# Patient Record
Sex: FEMALE | Race: BLACK OR AFRICAN AMERICAN | Employment: OTHER | URBAN - METROPOLITAN AREA
[De-identification: names, ages, dates, MRNs, and addresses within clinical notes are randomized per-mention and may not be internally consistent; named-entity substitution may affect disease eponyms.]

---

## 2017-02-28 PROBLEM — E11.59 HYPERTENSION COMPLICATING DIABETES (HCC): Status: ACTIVE | Noted: 2017-02-28

## 2017-02-28 PROBLEM — I15.2 HYPERTENSION COMPLICATING DIABETES (HCC): Status: ACTIVE | Noted: 2017-02-28

## 2017-05-15 ENCOUNTER — HOSPITAL ENCOUNTER (OUTPATIENT)
Dept: GENERAL RADIOLOGY | Age: 72
Discharge: HOME | End: 2017-05-15
Payer: MEDICARE

## 2017-05-15 DIAGNOSIS — R06.02 SHORTNESS OF BREATH: ICD-10-CM

## 2017-05-15 PROCEDURE — 71020 XR CHEST PA LAT: CPT

## 2017-07-17 ENCOUNTER — HOSPITAL ENCOUNTER (OUTPATIENT)
Dept: CT IMAGING | Age: 72
Discharge: HOME | End: 2017-07-17
Payer: MEDICARE

## 2017-07-17 DIAGNOSIS — J98.4 RESTRICTIVE LUNG DISEASE: ICD-10-CM

## 2017-07-17 PROCEDURE — 71250 CT THORAX DX C-: CPT

## 2018-01-15 PROBLEM — E11.21 TYPE 2 DIABETES MELLITUS WITH NEPHROPATHY (HCC): Status: ACTIVE | Noted: 2018-01-15

## 2018-01-15 PROBLEM — F33.9 RECURRENT DEPRESSION (HCC): Status: ACTIVE | Noted: 2018-01-15

## 2018-02-28 ENCOUNTER — HOSPITAL ENCOUNTER (OUTPATIENT)
Dept: PHYSICAL THERAPY | Age: 73
Discharge: HOME | End: 2018-02-28
Payer: MEDICARE

## 2018-02-28 VITALS
DIASTOLIC BLOOD PRESSURE: 60 MMHG | OXYGEN SATURATION: 93 % | BODY MASS INDEX: 38.14 KG/M2 | SYSTOLIC BLOOD PRESSURE: 154 MMHG | HEIGHT: 67 IN | WEIGHT: 243 LBS | HEART RATE: 79 BPM

## 2018-02-28 PROCEDURE — 97162 PT EVAL MOD COMPLEX 30 MIN: CPT

## 2018-02-28 PROCEDURE — G8990 OTHER PT/OT CURRENT STATUS: HCPCS

## 2018-02-28 PROCEDURE — 97140 MANUAL THERAPY 1/> REGIONS: CPT

## 2018-02-28 PROCEDURE — G8991 OTHER PT/OT GOAL STATUS: HCPCS

## 2018-02-28 NOTE — PROGRESS NOTES
[address] [address]      INITIAL EVALUATION    NAME: Jayleen Naranjo AGE: 74 y.o. GENDER: female  DATE: 2/28/2018  REFERRING PHYSICIAN: Milton Rodriguez MD  HISTORY AND BACKGROUND:  Patient is a 75 y/o female with history LE lymphedema for greater than 10 years. Patient received treatment at [facility] prior to TKR in 2008, having undergone phase I CDT. Patient has continued with wear of custom flat knit knee high compression stockings, having them replaced via fitter at [facility], with most recent pair purchased 1-2 years ago. Note garments covering only half of lower legs, with fit likely impaired by recent progression of lymphedema. Oral diuretic has been increased to address increase in limb size, with patient reporting taking 60 mg Furosemide 2x/day. States legs have reduced in size, however, physicians nor patient want high dose of diuretic to continue. Patient is agreeable to proceeding with any treatment recommendations made today at conclusion of evaluation. Primary Diagnosis:  · Primary lymphedema (Q82.0)  Other Treatment Diagnoses:   Abnormality of gait (other abnormalities of gait and mobility R26.89)   Swelling not relieved by elevation ( R60.9 edema)   Venous insufficiency I87.2;    Cellulitis (R leg L03.116)   L LE DVT post TKE 2008 (L03.114)   PE (I26.9) 2008  Morbid Obesity (E66.01)  Date of Onset: prior to 2008  Present Symptoms and Functional Limitations: Bilateral LE lymphedema, L LE greater than R, however, recent cellulitis R foot. Home management of lymphedema with daytime compression stocking wear, custom flat knit knee high, CCL 2, however, not replaced in over a year. Gait deficits noted, patient using cane for gait, transfers/bed mobility require additional time.   LE weakness reported by patient. Lymphedema Life Impact Scale: 34/72; 50%, CK  Past Medical History:   Past Medical History:   Diagnosis Date    Anemia, chronic disease 7/31/2012    Arrhythmia 2006, 2008    [address] [facility]    Arthritis     OSTEO    CAD (coronary artery disease)     BLOCKAGES BILATERAL CAROTID, SAW MD 1 MONTH AGO    Chronic pain     LOWER BACK    Depression     Diabetes ([address]     TYPE 2, INSULIN    Hypercholesterolemia     Hypertension     Hypothyroidism 12/29/2011    Macular degeneration 9/18/2015    Osteoporosis     Pulmonary embolism ([address]     Pulmonary hypertension, mild 2006    Restrictive airway disease     Thromboembolus ([address] 2009    LEFT KNEE AFTER REPLACEMENT    Thyroid disease     HYPOTHYROID    Unspecified sleep apnea     USES CPAP     Past Surgical History:   Procedure Laterality Date    ENDOSCOPY, COLON, DIAGNOSTIC      2003;neg    HX HEART CATHETERIZATION  2006, 2008    2 TIMES    HX TONSILLECTOMY  1951    SC ANESTH,SURGERY OF SHOULDER      TOTAL KNEE ARTHROPLASTY  2009    LEFT     Current Medications:    Current Outpatient Prescriptions   Medication Sig    furosemide (LASIX) 40 mg tablet Take 1.5 Tabs by mouth two (2) times a day. (Patient taking differently: Take  by mouth. 2 tabs in am, 1 tab in pm)    atorvastatin (LIPITOR) 10 mg tablet TAKE 1 TABLET BY MOUTH  DAILY    cloNIDine HCl (CATAPRES) 0.2 mg tablet Take 1 tablet by mouth 3  times daily    potassium chloride SR (KLOR-CON 10) 10 mEq tablet TAKE 1 TABLET BY MOUTH  TWICE A DAY    levothyroxine (SYNTHROID) 100 mcg tablet Take 100 mcg by mouth Daily (before breakfast).  Cholecalciferol, Vitamin D3, (VITAMIN D3) 1,000 unit cap Take 1,000 Units by mouth daily.  ferrous sulfate (SLOW FE) 142 mg (45 mg iron) ER tablet Take  by mouth Daily (before breakfast).  VIT C/AMANDA AC/LUT/COPPER/ZNOX (PRESERVISION LUTEIN PO) Take  by mouth daily.     hydrALAZINE (APRESOLINE) 50 mg tablet Take 50 mg by mouth three (3) times daily.  metolazone (ZAROXOLYN) 2.5 mg tablet Take 2.5 mg by mouth. Twice a week.  ranolazine ER (RANEXA) 500 mg SR tablet Take 1,000 mg by mouth two (2) times a day.  febuxostat (ULORIC) 40 mg tab tablet Take 40 mg by mouth daily.  diltiazem hcl 360 mg Tb24 Take 360 mg by mouth daily.  insulin regular (NOVOLIN R, HUMULIN R) 100 unit/mL injection by SubCUTAneous route. 15 units BID (sometimes some at lunch depending on BS)    insulin NPH (NOVOLIN N, HUMULIN N) 100 unit/mL injection by SubCUTAneous route once. 20 units BID    aspirin (ASPIRIN) 325 mg tablet Take 325 mg by mouth daily.  VIT B12/INTRINS FACT/FA CMB #2 (INTRINSI B12-FOLATE PO) Take  by mouth daily. No current facility-administered medications for this encounter. Allergies: Allergies   Allergen Reactions    Levaquin [Levofloxacin] Other (comments)     Prolonged QT interval.      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 Currently lives alone, however, is moving in with her adult daughter in a few weeks. Pt reports long-term history of LE lymphedema, having become progressively worse over the past several month, 1 cellulitis infection R foot/LE recently, treated or oral antibiotic. Living Situation: alone currently, moving in with her adult daughter in the next few weeks. Trainable Caregiver?: patient states her daughter assists her as she is able currently, with will be more available when they live together. Self-care/ADLs: mod I showers, with shower seat/hand held shower, which she will also have with daughter. Dresses mod I, additional time. Light meal prep. Pt reports donning knee high garments mod I with donning aid. To bring to clinic for assessment. Mobility: transfers with straight cane, mod I, and additional time.   Gait short community distances with straight cane, able to walk from front door of clinic building to clinic 100+ feet, additional time, using elevator to get to second floor. Patient does not drive, over 5 years, taking transportation service to clinic today. Sleeping Arrangement:  bed   Adaptive Equipment Owned: cane, shower seat, hand held shower, CPAP. Previous Therapy:  Seen at [address] [place] [address] for phase I CDT over 10 years ago, so she is familiar with outpatient treatment of lymphedema. States she has been having custom flat knit daytime compression stockings replaced at [facility] intermittently, last replaced a year and a half ago, with proximal aspect of garment mid calf. Compression/Lymphedema Equipment:  Knee high [employer] Elvarex custom stockings, reportedly a year and a half old. Poor fit. Pt reports she would prefer to have compression stockings that she can apply lotion to her legs prior to donning garments, with latex in Elvarex garments limiting this. SUBJECTIVE:   Patient reports LE swelling for greater than 10 years. States she has continued with wear of knee high custom flat knit compression garment wear, however, currently garments are ill-fitting and in need of replacement. Pt is unable to recall how long LE lymphedema was present prior to treatment at [place], however, had become severe enough prior to 2008 that she sought medical attention. States her L LE has always been larger than her R LE, and that edema initially onset in foot region, L first, then R. Patients goals for therapy: reduce LE lymphedema; obtain latex free daytime custom flat knit compression stockings when appropriate, consider addition of night time garments.     OBJECTIVE DATA SUMMARY:   EXAMINATION/PRESENTATION/DECISION MAKING:   Pain:  Pain Scale 1: Numeric (0 - 10)  Pain Intensity 1: 8  Pain Location 1: Leg  Skin and Tissue Assessment:  Dermal Status:  []   Intact [x]  Dry   [x]  Tenuous [x] Flaky   []  Wound/lesion present []  Scars   [] Dermatitis    Texture/Consistency:  [x]  Boggy: foot/ankle [x]  Pitting Edema: +4 R dorsal foot/medial malleolar region; + 2 L dorsal foot/medial malleolar region. []  Brawny []  Combination   [x]  Fibrotic/Woody: distal lower leg    Pigmentation/Color Change:  []  Normal []  Hemosiderin   [x]  Red []  Erythematous   [x]  Hyperpigmented []  Hyperlipodermatosclerosis   Anomalies: na  []  Lymphorrhea []  Vesicles   []  Petechiae []  Warty Vercusis   []  Bullae []  Papilloma   Circulatory:  []  BELLE []  Varicosities:   [x]  Pulses: palpable dorsal pedal pulse bilateral []  Vascular studies ruled out DVT in past   [x]  DVT History: post TKR 2008, including PE. No DVT history since    Nails:  []   Normal  [x]   Fungus  Stemmers Sign: R>L, positive      Height:  Height: 5' 7\" (170.2 cm)  Weight:  Weight: 110.2 kg (243 lb)   BMI:  BMI (calculated): 38.1  (36 or greater: adversely affecting lymphedema)    Volumetric Measurements:   Right:  61,408. 16 mL Left:  12,324.83 mL   % Difference: 4.02%    (See scanned graph)  Range of Motion: bilateral hip flex 90; R knee flex 80; L knee flex 90; ankle DF -10 bilateral.    Strength: bilateral hip m 4-/5; quad/ham 4-/5; anterior tibialis m 4=/5. Sensation:  Decreased light touch, bilateral toes. Mobility:  Bed/Chair Mobility:  Modified independent and Additional time  Transfers:  Modified independent and Additional time    Sitting Balance:  good Standing Balance:  Fair+   Gait:  Modified independent and Additional time, wide base of support with straight cane, decreased stride length bilateral, forward flexed posture. Mirta Dykes would benefit patient during bandaging phase of treatment. Wheelchair Mobility:  na   Endurance: Moderately compromised, gait short community distances with straight cane. Stairs:  Not assessed         Safety:  Patient is alert and oriented:  x4   Safety awareness:  intact   Fall Risk?:  Moderate, ambulates with straight cane.   See gait assessment Patient given written fall prevention handout: Yes   Precautions:  Standard lymphedema precautions to include avoiding blood pressure readings, injections and IVs or other procedures/acts that could lead to broken skin on affected area, and avoiding excessive heat, resistive activity or altitude without compression garment    Functional Measure:   LLIS  In compliance with CMSs Claims Based Outcome Reporting, the following G-code set was chosen for this patient based on their primary functional limitation being treated: The outcome measure chosen to determine the severity of the functional limitation was the LLIS with a score of 34/72 which was correlated with the impairment scale. ? Other PT/OT Primary Functional Limitations:     - CURRENT STATUS: CK - 40%-59% impaired, limited or restricted    - GOAL STATUS: CJ - 20%-39% impaired, limited or restricted    - D/C STATUS:  ---------------To be determined---------------     Physical Therapy Evaluation Charge Determination   History Examination Presentation Decision-Making   MEDIUM  Complexity : 1-2 comorbidities / personal factors will impact the outcome/ POC  MEDIUM Complexity : 3 Standardized tests and measures addressing body structure, function, activity limitation and / or participation in recreation  MEDIUM Complexity : Evolving with changing characteristics  Other outcome measures LLIS  HIGH       Based on the above components, the patient evaluation is determined to be of the following complexity level: MEDIUM    Evaluation Time: 12:42-1:14pm  32 minutes    TREATMENT PROVIDED:   1. Treatment description:  The patient was educated regarding the role and function of the lymphatic system, pathophysiology of primary lymphedema, and was instructed in the lymphedema management protocol of complete decongestive therapy (CDT).   This includes skin care to prevent breakdown or infection, lymphedema exercises, custom compression therapy options (bandaging, compression garments, compression pump, Obinna Prim, JoViPak, [employer], etc. as needed), and decongestion with manual lymphatic drainage as indicated. We discussed the need for consistent compression system for lymphedema management. Diaphragmatic breathing instruction and skin care education was performed,applying low pH lotion to extremity using upward strokes to stimulate lymphatic vessels. Pt was given information regarding obtaining bandaging supplies. Patient was instructed in need for custom flat knit compression stockings due to foot and lower ankle involvement, and advised that garments require replacement every 6 months, laundering and drying garments nightly. Pt advised in the importance of skin care, with patient presenting with dry/flaky skin, stating she would prefer a latex free compression garment option that would allow her to apply lotion both morning and evening. Treatment time:  1:15-2:12 pm  Minutes: 62     Manual therapy 3    ASSESSMENT:   Ant Larios is a 74 y.o. female who presents with stage II lymphedema, bilateral LE, L>R from ankle to groin, however, R foot involvement>L foot involvement today. Pt recently treated for cellulitis R second toe with oral antibiotic, with Lasix increased to 60 mg dose 2x/day. Pt reports foot/ankle swelling has improved with increase in oral diuretic dosage, however, expresses concern regarding kidney function. Pt reports medical plan was for short term use of higher dose of diuretic until treatment for lymphedema able to be initiated. Pt advised that 2x/week treatment recommended, with onset of treatment delayed by patient due to wanting appts later in the day. Limb volume discrepancy at 4.02%, with L LE greater in size from ankle to groin that R LE, however, foot involvement present bilaterally. Pt presents in appearance as lymphedema being primary in nature.   Patient will benefit from complete decongestive therapy (CDT) including manual lymphatic drainage (MLD) technique, short-stretch textile bandages/compression system to decongest limb, and kinesiotaping as appropriate. Patient will receive instruction in proper skin care to recognize signs/symptoms of and prevent infection, therapeutic exercise, and self-MLD for independent home program and restorative lymphatic performance. This care is medically necessary due to the infection risk with lymphedema, and to improve functional activities. CDT is necessary to resolve swelling to allow patient to return to wearing normal clothes/footwear, and prevent worsening of symptoms, such as venous stasis ulcerations, infections, or hospitalizations. Patient will be independent with home program strategies to allow improved ADL ability and mobility and to allow patient to return to greatest functional independence. Rehabilitation potential is considered to be Good. Factors which may influence rehabilitation potential include good management of lymphedema previously, however, mobility and LE weakness are concerns. Pt is moving in with her daughter for improved family support. .  Patient will benefit from 2x/week upon treatment initiation physical therapy visits over 12 weeks to optimize improvement in these areas. PLAN OF CARE:   Recommendations and Planned Interventions:  Manual lymph drainage/complete decongestive therapy  Multi-layer compression bandaging (short-stretch)  Compression garment fitting/provision  Lymphedema therapeutic exercise  AROM/PROM/Strength/Coordination  Self-care training  Functional mobility training  Education in skin care and lymphedema precautions  Self-MLD education per home program  Vasopneumatic pump trial as medically indicated  Self-bandaging education per home program  Caregiver education as needed  Wound care as needed     GOALS  Short term goals  Time frame: 2-8 weeks  1.  Instruct the patient to be independent with proper skin care to prevent future skin breakdown and decrease the potential risk for infections that are associated with Lymphedema. 2. Patient will be independent with a personal lymphedema exercise program to assist with the lymphatic flow and reduce limb volume R LE by 400 mL, L LE by 600 mL from limb volume measurements at time of initial treatment. 3. Patient will understand the signs and symptoms of acute infection. Long term goals  Time frame: 8-12 weeks    1. Patient will have knowledge of the compression options and acquire a safe and   appropriate daytime and night time compression system to prevent    re-accumulation of fluid. 2.  Patient or family will be able to don/doff garments independently. Garment   system effectiveness will be evaluated prior to discharge for better long-term   management and outcomes. 3.  Improvement in LLIS outcomes measure by 10-14 points upon transition to compression garment wear. 4.   To transition patient to independent restorative phase of CDT. Patient has participated in goal setting and agrees to work toward plan of care. Patient was instructed to call if questions or concerns arise. Thank you for this referral.  Tamanna Ped, PT, CLT   Time Calculation: 90 mins    TREATMENT PLAN EFFECTIVE DATES:   2/28/2018 TO 5/25/2018  I have read the above plan of care for [facility]. I certify the above prescribed services are required by this patient and are medically necessary.   The above plan of care has been developed in conjunction with the lymphedema/physical therapist.       Physician Signature: ____________________________________Date:______________

## 2018-03-05 NOTE — PROGRESS NOTES
[address] [place] [address]      INITIAL EVALUATION    NAME: Norma Bailey AGE: 74 y.o. GENDER: female  DATE: 2/28/2018  REFERRING PHYSICIAN: Joseph Mejía MD  HISTORY AND BACKGROUND:  Patient is a 73 y/o female with history LE lymphedema for greater than 10 years. Patient received treatment at [facility] prior to TKR in 2008, having undergone phase I CDT. Patient has continued with wear of custom flat knit knee high compression stockings, having them replaced via fitter at [facility], with most recent pair purchased 1-2 years ago. Note garments covering only half of lower legs, with fit likely impaired by recent progression of lymphedema. Oral diuretic has been increased to address increase in limb size, with patient reporting taking 60 mg Furosemide 2x/day. States legs have reduced in size, however, physicians nor patient want high dose of diuretic to continue. Patient is agreeable to proceeding with any treatment recommendations made today at conclusion of evaluation. Primary Diagnosis:  · Primary lymphedema (Q82.0)  Other Treatment Diagnoses:   Abnormality of gait (other abnormalities of gait and mobility R26.89)   Swelling not relieved by elevation ( R60.9 edema)   Venous insufficiency I87.2;    Cellulitis (R leg L03.116)   L LE DVT post TKE 2008 (L03.114)   PE (I26.9) 2008  Morbid Obesity (E66.01)  Date of Onset: prior to 2008  Present Symptoms and Functional Limitations: Bilateral LE lymphedema, L LE greater than R, however, recent cellulitis R foot. Home management of lymphedema with daytime compression stocking wear, custom flat knit knee high, CCL 2, however, not replaced in over a year. Gait deficits noted, patient using cane for gait, transfers/bed mobility require additional time.   LE weakness reported by patient. Lymphedema Life Impact Scale: 34/72; 50%, CK  Past Medical History:   Past Medical History:   Diagnosis Date    Anemia, chronic disease 7/31/2012    Arrhythmia 2006, 2008    [address]    Arthritis     OSTEO    CAD (coronary artery disease)     BLOCKAGES BILATERAL CAROTID, SAW MD 1 MONTH AGO    Chronic pain     LOWER BACK    Depression     Diabetes ([address]     TYPE 2, INSULIN    Hypercholesterolemia     Hypertension     Hypothyroidism 12/29/2011    Macular degeneration 9/18/2015    Osteoporosis     Pulmonary embolism ([address]     Pulmonary hypertension, mild 2006    Restrictive airway disease     Thromboembolus ([address] 2009    LEFT KNEE AFTER REPLACEMENT    Thyroid disease     HYPOTHYROID    Unspecified sleep apnea     USES CPAP     Past Surgical History:   Procedure Laterality Date    ENDOSCOPY, COLON, DIAGNOSTIC      2003;neg    HX HEART CATHETERIZATION  2006, 2008    2 TIMES    HX TONSILLECTOMY  1951    UT ANESTH,SURGERY OF SHOULDER      TOTAL KNEE ARTHROPLASTY  2009    LEFT     Current Medications:    Current Outpatient Prescriptions   Medication Sig    furosemide (LASIX) 40 mg tablet Take 1.5 Tabs by mouth two (2) times a day. (Patient taking differently: Take  by mouth. 2 tabs in am, 1 tab in pm)    atorvastatin (LIPITOR) 10 mg tablet TAKE 1 TABLET BY MOUTH  DAILY    cloNIDine HCl (CATAPRES) 0.2 mg tablet Take 1 tablet by mouth 3  times daily    potassium chloride SR (KLOR-CON 10) 10 mEq tablet TAKE 1 TABLET BY MOUTH  TWICE A DAY    levothyroxine (SYNTHROID) 100 mcg tablet Take 100 mcg by mouth Daily (before breakfast).  Cholecalciferol, Vitamin D3, (VITAMIN D3) 1,000 unit cap Take 1,000 Units by mouth daily.  ferrous sulfate (SLOW FE) 142 mg (45 mg iron) ER tablet Take  by mouth Daily (before breakfast).  VIT C/AMANDA AC/LUT/COPPER/ZNOX (PRESERVISION LUTEIN PO) Take  by mouth daily.     hydrALAZINE (APRESOLINE) 50 mg tablet Take 50 mg by mouth three (3) times daily.  metolazone (ZAROXOLYN) 2.5 mg tablet Take 2.5 mg by mouth. Twice a week.  ranolazine ER (RANEXA) 500 mg SR tablet Take 1,000 mg by mouth two (2) times a day.  febuxostat (ULORIC) 40 mg tab tablet Take 40 mg by mouth daily.  diltiazem hcl 360 mg Tb24 Take 360 mg by mouth daily.  insulin regular (NOVOLIN R, HUMULIN R) 100 unit/mL injection by SubCUTAneous route. 15 units BID (sometimes some at lunch depending on BS)    insulin NPH (NOVOLIN N, HUMULIN N) 100 unit/mL injection by SubCUTAneous route once. 20 units BID    aspirin (ASPIRIN) 325 mg tablet Take 325 mg by mouth daily.  VIT B12/INTRINS FACT/FA CMB #2 (INTRINSI B12-FOLATE PO) Take  by mouth daily. No current facility-administered medications for this encounter. Allergies: Allergies   Allergen Reactions    Levaquin [Levofloxacin] Other (comments)     Prolonged QT interval.      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 Currently lives alone, however, is moving in with her adult daughter in a few weeks. Pt reports long-term history of LE lymphedema, having become progressively worse over the past several month, 1 cellulitis infection R foot/LE recently, treated or oral antibiotic. Living Situation: alone currently, moving in with her adult daughter in the next few weeks. Trainable Caregiver?: patient states her daughter assists her as she is able currently, with will be more available when they live together. Self-care/ADLs: mod I showers, with shower seat/hand held shower, which she will also have with daughter. Dresses mod I, additional time. Light meal prep. Pt reports donning knee high garments mod I with donning aid. To bring to clinic for assessment. Mobility: transfers with straight cane, mod I, and additional time.   Gait short community distances with straight cane, able to walk from front door of clinic building to clinic 100+ feet, additional time, using elevator to get to second floor. Patient does not drive, over 5 years, taking transportation service to clinic today. Sleeping Arrangement:  bed   Adaptive Equipment Owned: cane, shower seat, hand held shower, CPAP. Previous Therapy:  Seen at [address] [place] [address] for phase I CDT over 10 years ago, so she is familiar with outpatient treatment of lymphedema. States she has been having custom flat knit daytime compression stockings replaced at [facility] intermittently, last replaced a year and a half ago, with proximal aspect of garment mid calf. Compression/Lymphedema Equipment:  Knee high [employer] Elvarex custom stockings, reportedly a year and a half old. Poor fit. Pt reports she would prefer to have compression stockings that she can apply lotion to her legs prior to donning garments, with latex in Elvarex garments limiting this. SUBJECTIVE:   Patient reports LE swelling for greater than 10 years. States she has continued with wear of knee high custom flat knit compression garment wear, however, currently garments are ill-fitting and in need of replacement. Pt is unable to recall how long LE lymphedema was present prior to treatment at [place], however, had become severe enough prior to 2008 that she sought medical attention. States her L LE has always been larger than her R LE, and that edema initially onset in foot region, L first, then R. Patients goals for therapy: reduce LE lymphedema; obtain latex free daytime custom flat knit compression stockings when appropriate, consider addition of night time garments.     OBJECTIVE DATA SUMMARY:   EXAMINATION/PRESENTATION/DECISION MAKING:   Pain:  Pain Scale 1: Numeric (0 - 10)  Pain Intensity 1: 8  Pain Location 1: Leg  Skin and Tissue Assessment:  Dermal Status:  []   Intact [x]  Dry   [x]  Tenuous [x] Flaky   []  Wound/lesion present []  Scars   [] Dermatitis    Texture/Consistency:  [x]  Boggy: foot/ankle [x]  Pitting Edema: +4 R dorsal foot/medial malleolar region; + 2 L dorsal foot/medial malleolar region. []  Brawny []  Combination   [x]  Fibrotic/Woody: distal lower leg    Pigmentation/Color Change:  []  Normal []  Hemosiderin   [x]  Red []  Erythematous   [x]  Hyperpigmented []  Hyperlipodermatosclerosis   Anomalies: na  []  Lymphorrhea []  Vesicles   []  Petechiae []  Warty Vercusis   []  Bullae []  Papilloma   Circulatory:  []  BELLE []  Varicosities:   [x]  Pulses: palpable dorsal pedal pulse bilateral []  Vascular studies ruled out DVT in past   [x]  DVT History: post TKR 2008, including PE. No DVT history since    Nails:  []   Normal  [x]   Fungus  Stemmers Sign: R>L, positive      Height:  Height: 5' 7\" (170.2 cm)  Weight:  Weight: 110.2 kg (243 lb)   BMI:  BMI (calculated): 38.1  (36 or greater: adversely affecting lymphedema)    Volumetric Measurements:   Right:  89,872. 16 mL Left:  12,324.83 mL   % Difference: 4.02%    (See scanned graph)  Range of Motion: bilateral hip flex 90; R knee flex 80; L knee flex 90; ankle DF -10 bilateral.    Strength: bilateral hip m 4-/5; quad/ham 4-/5; anterior tibialis m 4=/5. Sensation:  Decreased light touch, bilateral toes. Mobility:  Bed/Chair Mobility:  Modified independent and Additional time  Transfers:  Modified independent and Additional time    Sitting Balance:  good Standing Balance:  Fair+   Gait:  Modified independent and Additional time, wide base of support with straight cane, decreased stride length bilateral, forward flexed posture. Ayderubia Eugene would benefit patient during bandaging phase of treatment. Wheelchair Mobility:  na   Endurance: Moderately compromised, gait short community distances with straight cane. Stairs:  Not assessed         Safety:  Patient is alert and oriented:  x4   Safety awareness:  intact   Fall Risk?:  Moderate, ambulates with straight cane.   See gait assessment Patient given written fall prevention handout: Yes   Precautions:  Standard lymphedema precautions to include avoiding blood pressure readings, injections and IVs or other procedures/acts that could lead to broken skin on affected area, and avoiding excessive heat, resistive activity or altitude without compression garment    Functional Measure:   LLIS  In compliance with CMSs Claims Based Outcome Reporting, the following G-code set was chosen for this patient based on their primary functional limitation being treated: The outcome measure chosen to determine the severity of the functional limitation was the LLIS with a score of 34/72 which was correlated with the impairment scale. ? Other PT/OT Primary Functional Limitations:     - CURRENT STATUS: CK - 40%-59% impaired, limited or restricted    - GOAL STATUS: CJ - 20%-39% impaired, limited or restricted    - D/C STATUS:  ---------------To be determined---------------     Physical Therapy Evaluation Charge Determination   History Examination Presentation Decision-Making   MEDIUM  Complexity : 1-2 comorbidities / personal factors will impact the outcome/ POC  MEDIUM Complexity : 3 Standardized tests and measures addressing body structure, function, activity limitation and / or participation in recreation  MEDIUM Complexity : Evolving with changing characteristics  Other outcome measures LLIS  HIGH       Based on the above components, the patient evaluation is determined to be of the following complexity level: MEDIUM    Evaluation Time: 12:42-1:14pm  32 minutes    TREATMENT PROVIDED:   1. Treatment description:  The patient was educated regarding the role and function of the lymphatic system, pathophysiology of primary lymphedema, and was instructed in the lymphedema management protocol of complete decongestive therapy (CDT).   This includes skin care to prevent breakdown or infection, lymphedema exercises, custom compression therapy options (bandaging, compression garments, compression pump, [facility], [employer], [employer], etc. as needed), and decongestion with manual lymphatic drainage as indicated. We discussed the need for consistent compression system for lymphedema management. Diaphragmatic breathing instruction and skin care education was performed,applying low pH lotion to extremity using upward strokes to stimulate lymphatic vessels. Pt was given information regarding obtaining bandaging supplies. Patient was instructed in need for custom flat knit compression stockings due to foot and lower ankle involvement, and advised that garments require replacement every 6 months, laundering and drying garments nightly. Pt advised in the importance of skin care, with patient presenting with dry/flaky skin, stating she would prefer a latex free compression garment option that would allow her to apply lotion both morning and evening. Treatment time:  1:15-2:12 pm  Minutes: 62     Manual therapy 3    ASSESSMENT:   Monica Mercado is a 74 y.o. female who presents with stage II lymphedema, bilateral LE, L>R from ankle to groin, however, R foot involvement>L foot involvement today. Pt recently treated for cellulitis R second toe with oral antibiotic, with Lasix increased to 60 mg dose 2x/day. Pt reports foot/ankle swelling has improved with increase in oral diuretic dosage, however, expresses concern regarding kidney function. Pt reports medical plan was for short term use of higher dose of diuretic until treatment for lymphedema able to be initiated. Pt advised that 2x/week treatment recommended, with onset of treatment delayed by patient due to wanting appts later in the day. Limb volume discrepancy at 4.02%, with L LE greater in size from ankle to groin that R LE, however, foot involvement present bilaterally. Pt presents in appearance as lymphedema being primary in nature.   Patient will benefit from complete decongestive therapy (CDT) including manual lymphatic drainage (MLD) technique, short-stretch textile bandages/compression system to decongest limb, and kinesiotaping as appropriate. Patient will receive instruction in proper skin care to recognize signs/symptoms of and prevent infection, therapeutic exercise, and self-MLD for independent home program and restorative lymphatic performance. This care is medically necessary due to the infection risk with lymphedema, and to improve functional activities. CDT is necessary to resolve swelling to allow patient to return to wearing normal clothes/footwear, and prevent worsening of symptoms, such as venous stasis ulcerations, infections, or hospitalizations. Patient will be independent with home program strategies to allow improved ADL ability and mobility and to allow patient to return to greatest functional independence. Rehabilitation potential is considered to be Good. Factors which may influence rehabilitation potential include good management of lymphedema previously, however, mobility and LE weakness are concerns. Pt is moving in with her daughter for improved family support. .  Patient will benefit from 2x/week upon treatment initiation physical therapy visits over 12 weeks to optimize improvement in these areas. PLAN OF CARE:   Recommendations and Planned Interventions:  Manual lymph drainage/complete decongestive therapy  Multi-layer compression bandaging (short-stretch)  Compression garment fitting/provision  Lymphedema therapeutic exercise  AROM/PROM/Strength/Coordination  Self-care training  Functional mobility training  Education in skin care and lymphedema precautions  Self-MLD education per home program  Vasopneumatic pump trial as medically indicated  Self-bandaging education per home program  Caregiver education as needed  Wound care as needed     GOALS  Short term goals  Time frame: 2-8 weeks  1.  Instruct the patient to be independent with proper skin care to prevent future skin breakdown and decrease the potential risk for infections that are associated with Lymphedema. 2. Patient will be independent with a personal lymphedema exercise program to assist with the lymphatic flow and reduce limb volume R LE by 400 mL, L LE by 600 mL from limb volume measurements at time of initial treatment. 3. Patient will understand the signs and symptoms of acute infection. Long term goals  Time frame: 8-12 weeks    1. Patient will have knowledge of the compression options and acquire a safe and   appropriate daytime and night time compression system to prevent    re-accumulation of fluid. 2.  Patient or family will be able to don/doff garments independently. Garment   system effectiveness will be evaluated prior to discharge for better long-term   management and outcomes. 3.  Improvement in LLIS outcomes measure by 10-14 points upon transition to compression garment wear. 4.   To transition patient to independent restorative phase of CDT. Patient has participated in goal setting and agrees to work toward plan of care. Patient was instructed to call if questions or concerns arise. Thank you for this referral.  Linda Goff, PT, CLT   Time Calculation: 90 mins    TREATMENT PLAN EFFECTIVE DATES:   3/5/2018 TO 5/25/2018  I have read the above plan of care for [facility]. I certify the above prescribed services are required by this patient and are medically necessary.   The above plan of care has been developed in conjunction with the lymphedema/physical therapist.       Physician Signature: ____________________________________Date:______________

## 2018-04-09 ENCOUNTER — HOSPITAL ENCOUNTER (OUTPATIENT)
Dept: PHYSICAL THERAPY | Age: 73
Discharge: HOME | End: 2018-04-09
Payer: MEDICARE

## 2018-04-09 ENCOUNTER — HOSPITAL ENCOUNTER (EMERGENCY)
Age: 73
Discharge: HOME | End: 2018-04-09
Attending: EMERGENCY MEDICINE | Admitting: EMERGENCY MEDICINE
Payer: MEDICARE

## 2018-04-09 VITALS
DIASTOLIC BLOOD PRESSURE: 69 MMHG | OXYGEN SATURATION: 100 % | HEART RATE: 65 BPM | WEIGHT: 240 LBS | TEMPERATURE: 97.7 F | SYSTOLIC BLOOD PRESSURE: 103 MMHG | HEIGHT: 66 IN | BODY MASS INDEX: 38.57 KG/M2 | RESPIRATION RATE: 18 BRPM

## 2018-04-09 DIAGNOSIS — L03.90 CELLULITIS, UNSPECIFIED CELLULITIS SITE: Primary | ICD-10-CM

## 2018-04-09 LAB
ANION GAP SERPL CALC-SCNC: 10 MMOL/L (ref 5–15)
BASOPHILS # BLD: 0 K/UL (ref 0–0.1)
BASOPHILS NFR BLD: 0 % (ref 0–1)
BUN SERPL-MCNC: 44 MG/DL (ref 6–20)
BUN/CREAT SERPL: 22 (ref 12–20)
CALCIUM SERPL-MCNC: 9.7 MG/DL (ref 8.5–10.1)
CHLORIDE SERPL-SCNC: 98 MMOL/L (ref 97–108)
CO2 SERPL-SCNC: 31 MMOL/L (ref 21–32)
CREAT SERPL-MCNC: 2.04 MG/DL (ref 0.55–1.02)
DIFFERENTIAL METHOD BLD: ABNORMAL
EOSINOPHIL # BLD: 0.3 K/UL (ref 0–0.4)
EOSINOPHIL NFR BLD: 3 % (ref 0–7)
ERYTHROCYTE [DISTWIDTH] IN BLOOD BY AUTOMATED COUNT: 16 % (ref 11.5–14.5)
GLUCOSE SERPL-MCNC: 149 MG/DL (ref 65–100)
HCT VFR BLD AUTO: 27.5 % (ref 35–47)
HGB BLD-MCNC: 9.1 G/DL (ref 11.5–16)
IMM GRANULOCYTES # BLD: 0.1 K/UL (ref 0–0.04)
IMM GRANULOCYTES NFR BLD AUTO: 1 % (ref 0–0.5)
LYMPHOCYTES # BLD: 1.4 K/UL (ref 0.8–3.5)
LYMPHOCYTES NFR BLD: 14 % (ref 12–49)
MCH RBC QN AUTO: 29.1 PG (ref 26–34)
MCHC RBC AUTO-ENTMCNC: 33.1 G/DL (ref 30–36.5)
MCV RBC AUTO: 87.9 FL (ref 80–99)
MONOCYTES # BLD: 1 K/UL (ref 0–1)
MONOCYTES NFR BLD: 9 % (ref 5–13)
NEUTS SEG # BLD: 7.6 K/UL (ref 1.8–8)
NEUTS SEG NFR BLD: 73 % (ref 32–75)
NRBC # BLD: 0 K/UL (ref 0–0.01)
NRBC BLD-RTO: 0 PER 100 WBC
PLATELET # BLD AUTO: 473 K/UL (ref 150–400)
PMV BLD AUTO: 10.5 FL (ref 8.9–12.9)
POTASSIUM SERPL-SCNC: 3.8 MMOL/L (ref 3.5–5.1)
RBC # BLD AUTO: 3.13 M/UL (ref 3.8–5.2)
SODIUM SERPL-SCNC: 139 MMOL/L (ref 136–145)
URATE SERPL-MCNC: 5.1 MG/DL (ref 2.6–6)
WBC # BLD AUTO: 10.3 K/UL (ref 3.6–11)

## 2018-04-09 PROCEDURE — 97530 THERAPEUTIC ACTIVITIES: CPT

## 2018-04-09 PROCEDURE — 84550 ASSAY OF BLOOD/URIC ACID: CPT | Performed by: NURSE PRACTITIONER

## 2018-04-09 PROCEDURE — 99283 EMERGENCY DEPT VISIT LOW MDM: CPT

## 2018-04-09 PROCEDURE — G8991 OTHER PT/OT GOAL STATUS: HCPCS

## 2018-04-09 PROCEDURE — 97140 MANUAL THERAPY 1/> REGIONS: CPT

## 2018-04-09 PROCEDURE — 74011250637 HC RX REV CODE- 250/637: Performed by: NURSE PRACTITIONER

## 2018-04-09 PROCEDURE — G8990 OTHER PT/OT CURRENT STATUS: HCPCS

## 2018-04-09 PROCEDURE — 80048 BASIC METABOLIC PNL TOTAL CA: CPT | Performed by: EMERGENCY MEDICINE

## 2018-04-09 PROCEDURE — 85025 COMPLETE CBC W/AUTO DIFF WBC: CPT | Performed by: EMERGENCY MEDICINE

## 2018-04-09 PROCEDURE — 36415 COLL VENOUS BLD VENIPUNCTURE: CPT | Performed by: EMERGENCY MEDICINE

## 2018-04-09 RX ORDER — TRAMADOL HYDROCHLORIDE 50 MG/1
50 TABLET ORAL
Qty: 20 TAB | Refills: 0 | Status: SHIPPED | OUTPATIENT
Start: 2018-04-09 | End: 2018-12-14

## 2018-04-09 RX ORDER — DOXYCYCLINE HYCLATE 100 MG
100 TABLET ORAL 2 TIMES DAILY
Qty: 14 TAB | Refills: 0 | Status: SHIPPED | OUTPATIENT
Start: 2018-04-09 | End: 2018-04-16

## 2018-04-09 RX ORDER — TRAMADOL HYDROCHLORIDE 50 MG/1
50 TABLET ORAL
Status: COMPLETED | OUTPATIENT
Start: 2018-04-09 | End: 2018-04-09

## 2018-04-09 RX ADMIN — TRAMADOL HYDROCHLORIDE 50 MG: 50 TABLET, FILM COATED ORAL at 11:43

## 2018-04-09 NOTE — DISCHARGE INSTRUCTIONS
Cellulitis: Care Instructions  Your Care Instructions    Cellulitis is a skin infection. It often occurs after a break in the skin from a scrape, cut, bite, or puncture, or after a rash. The doctor has checked you carefully, but problems can develop later. If you notice any problems or new symptoms, get medical treatment right away. Follow-up care is a key part of your treatment and safety. Be sure to make and go to all appointments, and call your doctor if you are having problems. It's also a good idea to know your test results and keep a list of the medicines you take. How can you care for yourself at home? · Take your antibiotics as directed. Do not stop taking them just because you feel better. You need to take the full course of antibiotics. · Prop up the infected area on pillows to reduce pain and swelling. Try to keep the area above the level of your heart as often as you can. · If your doctor told you how to care for your wound, follow your doctor's instructions. If you did not get instructions, follow this general advice:  ¨ Wash the wound with clean water 2 times a day. Don't use hydrogen peroxide or alcohol, which can slow healing. ¨ You may cover the wound with a thin layer of petroleum jelly, such as Vaseline, and a nonstick bandage. ¨ Apply more petroleum jelly and replace the bandage as needed. · Be safe with medicines. Take pain medicines exactly as directed. ¨ If the doctor gave you a prescription medicine for pain, take it as prescribed. ¨ If you are not taking a prescription pain medicine, ask your doctor if you can take an over-the-counter medicine. To prevent cellulitis in the future  · Try to prevent cuts, scrapes, or other injuries to your skin. Cellulitis most often occurs where there is a break in the skin. · If you get a scrape, cut, mild burn, or bite, wash the wound with clean water as soon as you can to help avoid infection.  Don't use hydrogen peroxide or alcohol, which can slow healing. · If you have swelling in your legs (edema), support stockings and good skin care may help prevent leg sores and cellulitis. · Take care of your feet, especially if you have diabetes or other conditions that increase the risk of infection. Wear shoes and socks. Do not go barefoot. If you have athlete's foot or other skin problems on your feet, talk to your doctor about how to treat them. When should you call for help? Call your doctor now or seek immediate medical care if:  ? · You have signs that your infection is getting worse, such as:  ¨ Increased pain, swelling, warmth, or redness. ¨ Red streaks leading from the area. ¨ Pus draining from the area. ¨ A fever. ? · You get a rash. ? Watch closely for changes in your health, and be sure to contact your doctor if:  ? · You are not getting better after 1 day (24 hours). ? · You do not get better as expected. Where can you learn more? Go to http://keshia-raymond.info/. [employer] in the search box to learn more about \"Cellulitis: Care Instructions. \"  Current as of: October 13, 2016  Content Version: 11.4  © 7832-1849 [employer]. Care instructions adapted under license by [employer] (which disclaims liability or warranty for this information). If you have questions about a medical condition or this instruction, always ask your healthcare professional. [place] [address] any warranty or liability for your use of this information.

## 2018-04-09 NOTE — ED NOTES
Pt c/o right foot pain and gout and now with cellulitis. Pt on PO cephalexin. Pt took tylenol at 0600 today.

## 2018-04-09 NOTE — ED PROVIDER NOTES
HPI Comments: 74 y.o. female with past medical history significant for HTN, DM, CAD, thromboembolus, PE, hypothyroidism, XOL,and anemia who presents from Lymphedema clinic with chief complaint of RLE swelling. The pt c/o RLE swelling with erythema and pain. The pt was started on Keflex 3 days ago and the left leg has improved but she is still experiencing pain and redness in the right leg. The pt was taking gout medication and was taken off of it by her PCP. The frequency that the pt takes diuretics was increased recently. The pt has been taking Tylenol for the pain with minimal relief. Denies any fever or chills, denies any numbness or tingling in the lower extremities. There are no other acute medical concerns at this time. Social hx: Former smoker, EtOH use  PCP: Areli Mahoney MD    Past Medical History:  7/31/2012: Anemia, chronic disease  2006, 2008: Arrhythmia      Comment: PALPITATIONS,,CARDIAC CATH  No date: Arthritis      Comment: OSTEO  No date: CAD (coronary artery disease)      Comment: BLOCKAGES BILATERAL CAROTID, SAW MD 1 MONTH                AGO  No date: Chronic pain      Comment: LOWER BACK  No date: Depression  No date: Diabetes ([address]      Comment: TYPE 2, INSULIN  No date: Hypercholesterolemia  No date: Hypertension  12/29/2011: Hypothyroidism  9/18/2015: Macular degeneration  No date: Osteoporosis  No date: Pulmonary embolism ([address]  2006: Pulmonary hypertension, mild (HCC)  No date: Restrictive airway disease  2009:  Thromboembolus ([address]      Comment: LEFT KNEE AFTER REPLACEMENT  No date: Thyroid disease      Comment: HYPOTHYROID  No date: Unspecified sleep apnea      Comment: USES CPAP  Past Surgical History:  No date: ENDOSCOPY, COLON, DIAGNOSTIC      Comment: 2003;neg  2006, 2008: HX HEART CATHETERIZATION      Comment: 2 TIMES  1951: HX TONSILLECTOMY  No date: NE ANESTH,SURGERY OF SHOULDER  2009: TOTAL KNEE ARTHROPLASTY      Comment: LEFT    Note written by Catrachito Obrien Ashwini, as dictated by Anne Marie Alfred NP  11:33 AM      The history is provided by the patient. No  was used. Past Medical History:   Diagnosis Date    Anemia, chronic disease 7/31/2012    Arrhythmia 2006, 2008    [address] [facility]    Arthritis     OSTEO    CAD (coronary artery disease)     BLOCKAGES BILATERAL CAROTID, SAW MD 1 MONTH AGO    Chronic pain     LOWER BACK    Depression     Diabetes ([address]     TYPE 2, INSULIN    Hypercholesterolemia     Hypertension     Hypothyroidism 12/29/2011    Macular degeneration 9/18/2015    Osteoporosis     Pulmonary embolism ([address]     Pulmonary hypertension, mild ([address] 2006    Restrictive airway disease     Thromboembolus ([address] 2009    LEFT KNEE AFTER REPLACEMENT    Thyroid disease     HYPOTHYROID    Unspecified sleep apnea     USES CPAP       Past Surgical History:   Procedure Laterality Date    ENDOSCOPY, COLON, DIAGNOSTIC      2003;neg    HX HEART CATHETERIZATION  2006, 2008    2 TIMES    HX TONSILLECTOMY  1951    [place] ANESTH,SURGERY OF SHOULDER      TOTAL KNEE ARTHROPLASTY  2009    LEFT         Family History:   Problem Relation Age of Onset    Heart Disease Mother     Diabetes Mother     Stroke Mother     Heart Disease Father     Diabetes Father     Stroke Father     Cancer Father      PROSTATE    Hypertension Sister     Hypertension Sister     Hypertension Sister        Social History     Social History    Marital status:      Spouse name: N/A    Number of children: N/A    Years of education: N/A     Occupational History    Not on file.      Social History Main Topics    Smoking status: Former Smoker     Packs/day: 0.25     Years: 5.00     Quit date: 4/14/1994    Smokeless tobacco: Never Used    Alcohol use Yes      Comment: RARE    Drug use: Not on file    Sexual activity: Not on file     Other Topics Concern    Not on file     Social History Narrative         ALLERGIES: Levaquin [levofloxacin]    Review of Systems   Constitutional: Negative for appetite change, chills, diaphoresis, fatigue and fever. HENT: Negative for congestion, ear discharge, ear pain, sinus pain, sinus pressure, sore throat and trouble swallowing. Eyes: Negative for photophobia, pain, redness and visual disturbance. Respiratory: Negative for chest tightness, shortness of breath and wheezing. Cardiovascular: Positive for leg swelling. Negative for chest pain and palpitations. Gastrointestinal: Negative for abdominal distention, abdominal pain, nausea and vomiting. Endocrine: Negative. Genitourinary: Negative for difficulty urinating, flank pain, frequency and urgency. Musculoskeletal: Negative for back pain, neck pain and neck stiffness. Skin: Positive for color change. Negative for pallor, rash and wound. RLE erythema   Allergic/Immunologic: Negative. Neurological: Negative for dizziness, speech difficulty, weakness and headaches. Hematological: Does not bruise/bleed easily. Psychiatric/Behavioral: Negative for behavioral problems. The patient is not nervous/anxious. All other systems reviewed and are negative. Vitals:    04/09/18 0939   BP: 103/69   Pulse: 65   Resp: 18   Temp: 97.7 °F (36.5 °C)   SpO2: 100%   Weight: 108.9 kg (240 lb)   Height: 5' 6\" (1.676 m)            Physical Exam   Constitutional: She is oriented to person, place, and time. She appears well-developed and well-nourished. No distress. HENT:   Head: Normocephalic and atraumatic. Right Ear: External ear normal.   Left Ear: External ear normal.   Nose: Nose normal.   Mouth/Throat: Oropharynx is clear and moist.   Eyes: Conjunctivae and EOM are normal. Pupils are equal, round, and reactive to light. Right eye exhibits no discharge. Left eye exhibits no discharge. Neck: Normal range of motion. Neck supple. No JVD present. No tracheal deviation present.    Cardiovascular: Normal rate, regular rhythm, normal heart sounds and intact distal pulses. Exam reveals no gallop. No murmur heard. Pulmonary/Chest: Effort normal and breath sounds normal. No respiratory distress. She has no wheezes. She has no rales. She exhibits no tenderness. Abdominal: Soft. Bowel sounds are normal. She exhibits no distension. There is no tenderness. There is no rebound and no guarding. Genitourinary:   Genitourinary Comments: Negative     Musculoskeletal: Normal range of motion. She exhibits edema and tenderness. Neurological: She is alert and oriented to person, place, and time. Skin: Skin is warm and dry. No rash noted. There is erythema (right lower extremity). No pallor. Psychiatric: She has a normal mood and affect. Her behavior is normal. Judgment and thought content normal.   Nursing note and vitals reviewed. MDM  Number of Diagnoses or Management Options  Cellulitis, unspecified cellulitis site: new and requires workup  Diagnosis management comments: Plan:  Discharge to home with second antibiotic and follow up with PCP and Lymphedema clinic. Amount and/or Complexity of Data Reviewed  Clinical lab tests: ordered and reviewed          ED Course   12:20 PM Discussed plan of care with Dr. Carmina Joseph. 12:27 PM  Pt has been reexamined. Pt has no new complaints, changes or physical findings. Care plan outlined and precautions discussed. All available results were reviewed with pt. All medications were reviewed with pt. All of pt's questions and concerns were addressed. Pt agrees to F/U as instructed and agrees to return to ED upon further deterioration. Pt is ready to go home.   Shakila Dutta NP      Procedures

## 2018-04-13 ENCOUNTER — HOSPITAL ENCOUNTER (OUTPATIENT)
Dept: PHYSICAL THERAPY | Age: 73
Discharge: HOME | End: 2018-04-13
Payer: MEDICARE

## 2018-04-13 ENCOUNTER — HOSPITAL ENCOUNTER (OUTPATIENT)
Dept: GENERAL RADIOLOGY | Age: 73
Discharge: HOME | End: 2018-04-13
Attending: INTERNAL MEDICINE
Payer: MEDICARE

## 2018-04-13 DIAGNOSIS — M79.671 RIGHT FOOT PAIN: ICD-10-CM

## 2018-04-13 PROBLEM — E11.3599 TYPE 2 DIABETES MELLITUS WITH PROLIFERATIVE RETINOPATHY (HCC): Status: ACTIVE | Noted: 2018-04-13

## 2018-04-13 PROBLEM — E66.01 SEVERE OBESITY (BMI 35.0-39.9) WITH COMORBIDITY (HCC): Status: ACTIVE | Noted: 2018-04-13

## 2018-04-13 PROCEDURE — 73630 X-RAY EXAM OF FOOT: CPT

## 2018-04-13 PROCEDURE — 97140 MANUAL THERAPY 1/> REGIONS: CPT

## 2018-04-13 NOTE — PROGRESS NOTES
_                                    [facility]  [address] [place] [address],[address]  Christianne Brand      LYmphedema Therapy  Visit: 3    [x]        Daily note               []       30 day/10th visit progress note    NAME: Lynnette Epps  DATE: 4/13/2018    GOALS: Continue as at time of evaluation  Short term goals  Time frame: 2-8 weeks  1. Instruct the patient to be independent with proper skin care to prevent future skin breakdown and decrease the potential risk for infections that are associated with Lymphedema. 2. Patient will be independent with a personal lymphedema exercise program to assist with the lymphatic flow and reduce limb volume R LE by 400 mL, L LE by 600 mL from limb volume measurements at time of initial treatment. 3. Patient will understand the signs and symptoms of acute infection. Long term goals  Time frame: 8-12 weeks     1. Patient will have knowledge of the compression options and acquire a safe and                                 appropriate daytime and night time compression system to prevent                                                 re-accumulation of fluid. 2.  Patient or family will be able to don/doff garments independently. Garment                                 system effectiveness will be evaluated prior to discharge for better long-term                                 management and outcomes. 3.  Improvement in LLIS outcomes measure by 10-14 points upon transition to compression garment wear. 4.   To transition patient to independent restorative phase of CDT. SUBJECTIVE REPORT:  Patient states she has continued taking doxycycline and keflex, and is trying to get in to see her doctor today as redness/warm to touch/pain persists R LE. Patient states pain has improved to 8/10.    Pain: 8/10           Gait:  Arrives via w/c transported by daughter, stating she is having pain with weight bearing R LE. ADLs:  Assist to don shoes in clinic today. Treatment Response:  L LE lower leg compression bandaging applied, with R LE assessed. Recommend pt go to [facility] ED for assessment of R LE secondary to patient reporting high level of pain, worsening of symptoms since Keflex initiated on Friday. Reviewed packet received at evaluation. Function: Arrives with straight cane, ambulation with antalgic gait into clinic. TREATMENT AND OBJECTIVE DATA SUMMARY:   Patient/Family Education:      Educated in skin care: Reviewed skin care principles     Educated in exercise: Instructed in exercise routine using the Trello ball. Instructed in self MLD:   Written sequence given and reviewed with patient as well as demonstration and instruction during MLD portion of the session. Instructed in don/doff of compression system:   Multi layer bandage (MLB) donning principles and wear precautions. Therapeutic Exercise/Procedure                  Home program: Patient to perform daily to BID skin care, deep abdominal breathing, exercise routine ankle pumps/circumduction, toe curls x 10 L LE only. .   Rationale: Exercise will increase the lymph angiomotoricity and tissue pressure of the skin and thus decrease swelling. Manual Therapy: 12:40-1:30 pm 50 minutes     Area to decongest: L LE only   Sequence used and effectiveness: Applied low Eucerin and anti-itch lotion L LE using upward strokes to stimulation lymphatic vessels. Area of skin irritation, intact, however, itching, treated with Calmoceptine prior to bandaging application. Assessed R LE pain/color/appearance/temp to touch R LE, with pain reported by patient at 8/10 today. Due to patient having started Keflex on 4/6/2018, doxycycline on 4/9/2018 per her report, with patient trying to get in to see PCP secondary to leg symptoms persistent. Dorsal pedal and posterior tibialis pulses located with doppler R LE.   Applied Eucerin lotion to LE, avoiding movement proximally, and anti-fungal spray to toes. Skin/wound care/debridement: L LE skin intact, color typical for patient. See baseline picture at time of evaluation. R LE remains red, warm to touch, pain reported at 8/10. See photos below from 4/9 and today. Upper/Lower extremity compression: L LE only, knee high  Stockinette  biafleece foot/ankle  Biafoam  Short stretch 6, 8, 10cm  Secured with Durapore tape  H tubigrip covering tape. F tubigrip over foot  Size 13 W shoe brought by patient for wear over bandaging, with shoe donned by therapist.      Patient provided with following instructions regarding home management of compression garments:  Compression bandaging instructions:  1. Compression bandaging will be applied twice a week by therapist in clinic, with adjustments to be made at home as indicated if bandaging becomes loose or uncomfortable. 2. If tolerated, remain bandaged between appointments with therapist, removing under the following conditionsDO NOT WAIT FOR A RETURN PHONE [address]:    -Numbness/tingling in extremity different from what you have experienced without the bandages in place.  -Compromise in circulation, monitoring blood refill into toes after applying gentle pressure to toes.  -Onset of pain in extremity that is sudden and severe in nature. -Redness, warmth in limb, and/or fever, flu-like symptoms, which may indicate infection. If this occurs, call your doctor right away. If you note a sudden increase in swelling in extremity, with or without redness/warmth, go to the emergency room as soon as possible to have blood clot ruled out. 3. Compression bandaging supplies that can be laundered are the brown compression wraps and any foam applied for padding. Launder in washer/dryer with NO fabric softener, bleach, woolite. Dry on a low heat.     4. Once compression garments are ordered, compression bandaging will be continued until garments arrive for fitting. This process can take several weeks. Kinesiotaping: na   Girth/Volume measurement: Reviewed results of volumetric measurements taken on evaluation. 4/9/2018 above  Today Below        TOTAL TREATMENT 50 mins       ASSESSMENT:   Treatment effectiveness and tolerance: L LE treated today as noted above, with patient referred to ED at [facility] on 4/9/18 secondary to progressive R LE cellulitis reported by patient despite 3 days of Keflex use. Patient reports doxycycline added at ED, and has continued, noting limited improvement in pain, and redness/warmth to touch R LE persistent. Note slight improvement in edema in R LE comparing with prior visit, which may be related to reduced activity, or improvement of antibiotic use. Deferred bandaging. Pt called PCP prior to appt to see if someone can see her today. Note patient ambulating to clinic today, which is an improvement from most recent visit, however, patient continues with c/o pain. Progress toward goals: Ongoing. PLAN OF CARE:   Changes to the plan of care: R LE treatment deferred with patient trying to get in with PCP for further assessment today. L LE bandaging continued, with above instructions provided regarding management of compression bandaging. Assess response to L LE treatment, and R LE condition at follow up visit, initiating treatment as indicated. Frequency: 2x/week   Other: To see PCP as able today.        Gianna Toth, PT, CLT

## 2018-04-16 ENCOUNTER — HOSPITAL ENCOUNTER (OUTPATIENT)
Dept: PHYSICAL THERAPY | Age: 73
Discharge: HOME | End: 2018-04-16
Payer: MEDICARE

## 2018-04-16 PROCEDURE — 97140 MANUAL THERAPY 1/> REGIONS: CPT

## 2018-04-16 NOTE — PROGRESS NOTES
_                                    [facility]  [address] [place] [address]  [address] [place] [address],[address]  Christianne Brand      LYmphedema Therapy  Visit: 4    [x]        Daily note               []       30 day/10th visit progress note    NAME: Lynnette Epps  DATE: 4/16/2018    GOALS: Continue as at time of evaluation  Short term goals  Time frame: 2-8 weeks  1. Instruct the patient to be independent with proper skin care to prevent future skin breakdown and decrease the potential risk for infections that are associated with Lymphedema. 2. Patient will be independent with a personal lymphedema exercise program to assist with the lymphatic flow and reduce limb volume R LE by 400 mL, L LE by 600 mL from limb volume measurements at time of initial treatment. 3. Patient will understand the signs and symptoms of acute infection. Long term goals  Time frame: 8-12 weeks     1. Patient will have knowledge of the compression options and acquire a safe and                                 appropriate daytime and night time compression system to prevent                                                 re-accumulation of fluid. 4/16/2018 completed measurements for L LE custom flat knit knee high compression stocking, Juzo [id number]. 2.  Patient or family will be able to don/doff garments independently. Garment                                 system effectiveness will be evaluated prior to discharge for better long-term                                 management and outcomes. 3.  Improvement in LLIS outcomes measure by 10-14 points upon transition to compression garment wear. 4.   To transition patient to independent restorative phase of CDT. SUBJECTIVE REPORT:  Patient states she has finished doxycycline and keflex, however, is now taking Amox-Clav, which was prescribed by PCP on 4/13/2018. States she is also taking Colchicine to address gout.   States pain level now decreased to 6/10. Pt states R LE does not appear \"ready to wrap\", with therapist in agreement. Pain: 6/10           Gait:  Arrives via w/c transported by daughter, stating she is having pain with weight bearing R LE. ADLs:  Assist to don shoes in clinic today. Treatment Response:  L LE lower leg compression bandaging applied, with R LE assessed. Recommend pt go to [facility] ED for assessment of R LE secondary to patient reporting high level of pain, worsening of symptoms since Keflex initiated on Friday. Reviewed packet received at evaluation. Function: Arrives with straight cane, ambulation with antalgic gait into clinic. Bandaging intact L LE upon arrival to treatment. TREATMENT AND OBJECTIVE DATA SUMMARY:   Patient/Family Education:      Educated in skin care: Reviewed skin care principles     Educated in exercise: Instructed in exercise routine using the shelia ball. Instructed in self MLD:   Written sequence given and reviewed with patient as well as demonstration and instruction during MLD portion of the session. Instructed in don/doff of compression system:   Multi layer bandage (MLB) donning principles and wear precautions. Therapeutic Exercise/Procedure                  Home program: Patient to perform daily to BID skin care, deep abdominal breathing, exercise routine ankle pumps/circumduction, toe curls x 10 L LE only. .   Rationale: Exercise will increase the lymph angiomotoricity and tissue pressure of the skin and thus decrease swelling. Manual Therapy: 12:45-1:49 pm 64 minutes     Area to decongest: L LE only   Sequence used and effectiveness: MLD primary lymphedema sequence performed L LE only, with patient tolerating well. Applied low Eucerin and anti-itch lotion L LE using upward strokes to stimulation lymphatic vessels. Anti-fungal spray applied L toes.        Assessed R LE pain/color/appearance/temp to touch R LE, with pain reported by patient at 6/10 today. Patient has completed Keflex and doxycycline, currently taking Amox-Clav for treatment of cellulitis, per her report, with patient having seen PCP again on 4/13/2018. Dorsal pedal and posterior tibialis pulses located with doppler R LE. Applied Eucerin lotion to LE, avoiding movement proximally, and anti-fungal spray to toes. Skin/wound care/debridement: L LE skin intact, color typical for patient. See baseline picture at time of evaluation. R LE remains red, warm to touch, pain reported at 6/10. Color improved. See photos below from 4/9, 4/13, and today. Upper/Lower extremity compression: L LE only, knee high  Stockinette  biafleece foot/ankle  Biafoam  Short stretch 6, 8, 10cm  Secured with Durapore tape  H tubigrip covering tape. F tubigrip over foot  Size 13 W shoe brought by patient for wear over bandaging, with shoe donned by therapist.      Patient provided with following instructions regarding home management of compression garments:  Compression bandaging instructions:  1. Compression bandaging will be applied twice a week by therapist in clinic, with adjustments to be made at home as indicated if bandaging becomes loose or uncomfortable. 2. If tolerated, remain bandaged between appointments with therapist, removing under the following conditionsDO NOT WAIT FOR A RETURN PHONE [address]:    -Numbness/tingling in extremity different from what you have experienced without the bandages in place.  -Compromise in circulation, monitoring blood refill into toes after applying gentle pressure to toes.  -Onset of pain in extremity that is sudden and severe in nature. -Redness, warmth in limb, and/or fever, flu-like symptoms, which may indicate infection. If this occurs, call your doctor right away.   If you note a sudden increase in swelling in extremity, with or without redness/warmth, go to the emergency room as soon as possible to have blood clot ruled out. 3. Compression bandaging supplies that can be laundered are the brown compression wraps and any foam applied for padding. Launder in washer/dryer with NO fabric softener, bleach, woolite. Dry on a low heat. 4. Once compression garments are ordered, compression bandaging will be continued until garments arrive for fitting. This process can take several weeks. Kinesiotaping: na   Girth/Volume measurement: See girth measurements below. 4/9/2018 above  4/13/2018 Below      Today        Affected Side: bilateral LE, L LE treatment only at this time. Left (cm) Right (cm)   5th Tuberosity 24.8           Ankle 26.8           Calf 40.8           Mid Knee             Thigh             Groin             Length               TOTAL TREATMENT 64 mins       ASSESSMENT:   Treatment effectiveness and tolerance: L LE treated today as noted above, with patient referred to ED at [facility] on 4/9/18 secondary to progressive R LE cellulitis reported by patient despite 3 days of Keflex use. Patient reports doxycycline added at ED, and now taking Amox-Clav, prescribed by per PCP 4/13/2018. Note improvement in edema in R LE comparing with prior visit, which may be related to reduced activity, or improvement of antibiotic use. Deferred bandaging R LE.  MLD/MLB L LE, with L LE measured for custom flat knit knee high compression stockings with silver due to cellulitis history. Pt reports pain reduced to 6/10 at this time. Progress toward goals: Ongoing. PLAN OF CARE:   Changes to the plan of care: R LE treatment deferred with s/s of infection as noted above. L LE bandaging/MLD to continue. Assess response to L LE treatment, and R LE condition at follow up visit, initiating treatment as indicated.    Frequency: 2x/week           Roberto Montes, PT, CLT

## 2018-04-19 ENCOUNTER — HOSPITAL ENCOUNTER (OUTPATIENT)
Dept: PHYSICAL THERAPY | Age: 73
Discharge: HOME | End: 2018-04-19
Payer: MEDICARE

## 2018-04-19 PROCEDURE — 97140 MANUAL THERAPY 1/> REGIONS: CPT

## 2018-04-19 NOTE — PROGRESS NOTES
_                                    [facility] [address]  [address] [place] [address] [facility]  [address]      LYmphedema Therapy  G-Code 4/9/2018  Visit: 5    [x]        Daily note               []       30 day/10th visit progress note    NAME: Dania Hanson  DATE: 4/19/2018    GOALS: Continue as at time of evaluation  Short term goals  Time frame: 2-8 weeks  1. Instruct the patient to be independent with proper skin care to prevent future skin breakdown and decrease the potential risk for infections that are associated with Lymphedema. 2. Patient will be independent with a personal lymphedema exercise program to assist with the lymphatic flow and reduce limb volume R LE by 400 mL, L LE by 600 mL from limb volume measurements at time of initial treatment. 3. Patient will understand the signs and symptoms of acute infection. Long term goals  Time frame: 8-12 weeks     1. Patient will have knowledge of the compression options and acquire a safe and                                 appropriate daytime and night time compression system to prevent                                                 re-accumulation of fluid. 4/16/2018 completed measurements for L LE custom flat knit knee high compression stocking, Juzo [id number]. 2.  Patient or family will be able to don/doff garments independently. Garment                                 system effectiveness will be evaluated prior to discharge for better long-term                                 management and outcomes. 3.  Improvement in LLIS outcomes measure by 10-14 points upon transition to compression garment wear. 4.   To transition patient to independent restorative phase of CDT. SUBJECTIVE REPORT:  Patient states she returned to see Dr. Allegra Caldwell and had R LE doppler performed, negative for DVT. Clinic requested MD note.   States that she is continue with taking Amox-Clav, which was prescribed by PCP on 4/13/2018. States she is also taking Colchicine to address gout. States pain level now decreased to 3/10, however, redness persistent R LE, with patient reporting medical plan is for her to see PCP on Monday, and if R LE has not improved, he will hospitalize for treatment. Pain: 3/10           Gait:  Arrives via w/c transported by daughter, stating she is having pain with weight bearing R LE. ADLs:  Assist to don shoes in clinic today. Treatment Response:  L LE lower leg compression bandaging applied after MLD, with R LE assessed. Patient has follow up appt with PCP on 4/23/2018 for assessment of R LE. Reviewed packet received at evaluation. Function: Arrives with straight cane, ambulation with antalgic gait into clinic. Bandaging intact L LE upon arrival to treatment    TREATMENT AND OBJECTIVE DATA SUMMARY:   Patient/Family Education:      Educated in skin care: Reviewed skin care principles     Educated in exercise: Instructed in exercise routine using the [employer] ball. Instructed in self MLD:   Written sequence given and reviewed with patient as well as demonstration and instruction during MLD portion of the session. Instructed in don/doff of compression system:   Multi layer bandage (MLB) donning principles and wear precautions. Therapeutic Exercise/Procedure                  Home program: Patient to perform daily to BID skin care, deep abdominal breathing, exercise routine ankle pumps/circumduction, toe curls x 10 L LE only. .   Rationale: Exercise will increase the lymph angiomotoricity and tissue pressure of the skin and thus decrease swelling. Manual Therapy: 12:52-1:55 pm 63 minutes     Area to decongest: L LE only   Sequence used and effectiveness: MLD primary lymphedema sequence performed L LE only, with patient tolerating well. Applied low Eucerin and anti-itch lotion L LE using upward strokes to stimulation lymphatic vessels. Anti-fungal spray applied L toes. Assessed R LE pain/color/appearance/temp to touch R LE, with pain reported by patient at 3/10 today. Patient has completed Keflex and doxycycline, currently taking Amox-Clav for treatment of cellulitis, per her report, with patient to see PCP on 4/23/2018 for assessment of R LE. Skin/wound care/debridement: L LE skin intact, color typical for patient. Upper/Lower extremity compression: L LE only, knee high  Stockinette  biafleece foot/ankle  Biafoam  Short stretch 6, 8, 10cm  Secured with Durapore tape  H tubigrip covering tape. F tubigrip over foot  Size 13 W shoe brought by patient for wear over bandaging, with shoe donned by therapist.      Patient provided with following instructions regarding home management of compression garments:  Compression bandaging instructions:  1. Compression bandaging will be applied twice a week by therapist in clinic, with adjustments to be made at home as indicated if bandaging becomes loose or uncomfortable. 2. If tolerated, remain bandaged between appointments with therapist, removing under the following conditionsDO NOT WAIT FOR A RETURN PHONE [address]:    -Numbness/tingling in extremity different from what you have experienced without the bandages in place.  -Compromise in circulation, monitoring blood refill into toes after applying gentle pressure to toes.  -Onset of pain in extremity that is sudden and severe in nature. -Redness, warmth in limb, and/or fever, flu-like symptoms, which may indicate infection. If this occurs, call your doctor right away. If you note a sudden increase in swelling in extremity, with or without redness/warmth, go to the emergency room as soon as possible to have blood clot ruled out. 3. Compression bandaging supplies that can be laundered are the brown compression wraps and any foam applied for padding.   Launder in washer/dryer with NO fabric softener, bleach, woolite. Dry on a low heat. 4. Once compression garments are ordered, compression bandaging will be continued until garments arrive for fitting. This process can take several weeks. Kinesiotaping: na   Girth/Volume measurement: See girth measurements below. Affected Side: bilateral LE, L LE treatment only at this time. Left (cm) Right (cm)   5th Tuberosity 24           Ankle 25.7           Calf 40.8           Mid Knee             Thigh             Groin             Length               TOTAL TREATMENT 63 mins       ASSESSMENT:   Treatment effectiveness and tolerance: L LE treated today as noted above, with patient to see her PCP 4/23/2018 regarding persistent symptoms R LE, redness, pain, warm to touch continues despite antibiotic use. Pt report venous doppler performed in PCP office was negative for DVT, with visit note requested from MD office. Note improvement L LE with treatment,  MLD/MLB, with L LE measured for custom flat knit knee high compression stockings prior visit with silver due to cellulitis history. Pt reports pain reduced to 3/10 at this time. Progress toward goals: Ongoing. PLAN OF CARE:   Changes to the plan of care: R LE treatment deferred with s/s of infection as noted above. L LE bandaging/MLD to continue. Assess response to L LE treatment, and R LE condition at follow up visit, initiating treatment as indicated. Pt to notify clinic of hospitalization plan next week, with [facility] having a certified lymphedema therapist on staff agreeable to managing lymphedema while inpatient if this hospital is recommended by her physician. Otherwise, will return to treatment at this clinic as indicated.    Frequency: 2x/week           Gume Loya, PT, CLT

## 2018-04-24 ENCOUNTER — HOSPITAL ENCOUNTER (OUTPATIENT)
Dept: PHYSICAL THERAPY | Age: 73
Discharge: HOME | End: 2018-04-24
Payer: MEDICARE

## 2018-04-24 PROCEDURE — 97530 THERAPEUTIC ACTIVITIES: CPT

## 2018-04-24 PROCEDURE — 97140 MANUAL THERAPY 1/> REGIONS: CPT

## 2018-04-24 NOTE — PROGRESS NOTES
_                                    [facility]  [address] [place] [address] [address]      LYmphedema Therapy  G-Code 4/9/2018  Visit: 6    [x]        Daily note               []       30 day/10th visit progress note    NAME: Abel Ontiveros  DATE: 4/24/2018    GOALS: Continue as at time of evaluation  Short term goals  Time frame: 2-8 weeks  1. Instruct the patient to be independent with proper skin care to prevent future skin breakdown and decrease the potential risk for infections that are associated with Lymphedema. 2. Patient will be independent with a personal lymphedema exercise program to assist with the lymphatic flow and reduce limb volume R LE by 400 mL, L LE by 600 mL from limb volume measurements at time of initial treatment. 4/24/2018 initiated compression bandaging R LE, antibiotic completed, s/s of infection managed. 3. Patient will understand the signs and symptoms of acute infection. Long term goals  Time frame: 8-12 weeks     1. Patient will have knowledge of the compression options and acquire a safe and                                 appropriate daytime and night time compression system to prevent                                                 re-accumulation of fluid. 4/16/2018 completed measurements for L LE custom flat knit knee high compression stocking, Juzo [id number]. 2.  Patient or family will be able to don/doff garments independently. Garment                                 system effectiveness will be evaluated prior to discharge for better long-term                                 management and outcomes. 3.  Improvement in LLIS outcomes measure by 10-14 points upon transition to compression garment wear. 4.   To transition patient to independent restorative phase of CDT.             SUBJECTIVE REPORT:  Patient states she returned to see Dr. Rhina Milner and he indicates cellulitis is clear, however, inflammation persists in extremity, with 10 days of oral steroids prescribed, which she started yesterday. Pt reports pain R LE improved, and is agreeable to proceeding with bilateral LE treatment today. Pain: 2/10           Gait:  With straight cane, mod I, gait pattern normalizing with reduction of pain R LE>  ADLs: indep with don/doff of shoes. Treatment Response: Tolerated bilateral LE treatment today. Reviewed packet received at evaluation. Function: Arrives with straight cane, ambulation with antalgic gait into clinic. Bandaging intact L LE upon arrival to treatment    TREATMENT AND OBJECTIVE DATA SUMMARY:   Patient/Family Education:      Educated in skin care: Reviewed skin care principles     Educated in exercise: Instructed in exercise routine using the [employer] ball. Instructed in self MLD:   Written sequence given and reviewed with patient as well as demonstration and instruction during MLD portion of the session. Instructed in don/doff of compression system:   Multi layer bandage (MLB) donning principles and wear precautions. Therapeutic Exercise/Procedure                  Home program: Patient to perform daily to BID skin care, deep abdominal breathing, exercise routine ankle pumps/circumduction, toe curls x 10 L LE only. .   Rationale: Exercise will increase the lymph angiomotoricity and tissue pressure of the skin and thus decrease swelling. Manual Therapy: 12:45-1:39 pm  Therapeutic activity: 1:40-2:00 pm 54 minutes  20 minutes     Area to decongest: L LE only   Sequence used and effectiveness: MLD primary lymphedema sequence performed bilateral LE, with patient tolerating well. No c/o pain with MLD R LE. Applied Aquaphor and anti-itch lotion L LE using upward strokes to stimulation lymphatic vessels. R LE assessed prior to treatment, with redness subsided, hyperpigmentation persists, pain with superficial palpation resolved.   Proceed with treatment. Skin/wound care/debridement: Peeling of superficial skin noted R LE, not atypical after treatment of cellulitis. Skin intact bilateral LE. Upper/Lower extremity compression: Bilateral LE knee high  Stockinette  biafleece and cast padding foot/ankle  Biafoam  Short stretch 6, 8, 10cm  Secured with Durapore tape  H tubigrip covering tape. F tubigrip over foot  Size 13 W shoe brought by patient for wear over bandaging, with shoe donned by therapist.      Patient provided with following instructions regarding home management of compression garments:  Compression bandaging instructions:  1. Compression bandaging will be applied twice a week by therapist in clinic, with adjustments to be made at home as indicated if bandaging becomes loose or uncomfortable. 2. If tolerated, remain bandaged between appointments with therapist, removing under the following conditionsDO NOT WAIT FOR A RETURN PHONE [address]:    -Numbness/tingling in extremity different from what you have experienced without the bandages in place.  -Compromise in circulation, monitoring blood refill into toes after applying gentle pressure to toes.  -Onset of pain in extremity that is sudden and severe in nature. -Redness, warmth in limb, and/or fever, flu-like symptoms, which may indicate infection. If this occurs, call your doctor right away. If you note a sudden increase in swelling in extremity, with or without redness/warmth, go to the emergency room as soon as possible to have blood clot ruled out. 3. Compression bandaging supplies that can be laundered are the brown compression wraps and any foam applied for padding. Launder in washer/dryer with NO fabric softener, bleach, woolite. Dry on a low heat. 4. Once compression garments are ordered, compression bandaging will be continued until garments arrive for fitting. This process can take several weeks. Kinesiotaping: na   Girth/Volume measurement: See girth measurements below. Awaiting compression stocking L LE. Affected Side: bilateral LE, L LE treatment only at this time. Left (cm) Right (cm)   5th Tuberosity      26.8      Ankle      28.4      Calf      48.2      Mid Knee             Thigh             Groin             Length               TOTAL TREATMENT 75 mins       ASSESSMENT:   Treatment effectiveness and tolerance: Bilateral LE treated today as noted above, with R LE s/s of infection resolved, hyperpigmentation persists, with MD indicated inflammation persists per patient report. Pt report venous doppler performed in PCP office was negative for DVT prior visit, with visit note requested from MD office. Note improvement L LE with treatment,  MLD/MLB, with L LE measured for custom flat knit knee high compression stockings prior visit with silver due to cellulitis history. Pt reports pain reduced R LE to 2/10 at this time. Progress toward goals: Ongoing. PLAN OF CARE:   Changes to the plan of care: R LE treatment initiated today with s/s of infection resolved as noted above. L LE bandaging/MLD to continue. Assess response to bilateral LE treatment, assessing tolerance to R LE compression bandaging. Pt to notify clinic of any questions or concerns prior to next scheduled appt. Pt advised in bandaging precautions above, to monitor R LE more closely. Otherwise, will return to treatment at this clinic as indicated.    Frequency: 2x/week           Ash Low, PT, CLT

## 2018-04-27 ENCOUNTER — HOSPITAL ENCOUNTER (OUTPATIENT)
Dept: PHYSICAL THERAPY | Age: 73
Discharge: HOME | End: 2018-04-27
Payer: MEDICARE

## 2018-04-27 PROCEDURE — 97140 MANUAL THERAPY 1/> REGIONS: CPT

## 2018-04-27 PROCEDURE — 97760 ORTHOTIC MGMT&TRAING 1ST ENC: CPT

## 2018-04-27 NOTE — PROGRESS NOTES
_                                    [facility]  [address] [place] [address] [address]      LYmphedema Therapy  G-Code 4/9/2018  Visit: 7    [x]        Daily note               []       30 day/10th visit progress note    NAME: Jermaine Crockett  DATE: 4/27/2018    GOALS: Continue as at time of evaluation  Short term goals  Time frame: 2-8 weeks  1. Instruct the patient to be independent with proper skin care to prevent future skin breakdown and decrease the potential risk for infections that are associated with Lymphedema. 2. Patient will be independent with a personal lymphedema exercise program to assist with the lymphatic flow and reduce limb volume R LE by 400 mL, L LE by 600 mL from limb volume measurements at time of initial treatment. 4/24/2018 initiated compression bandaging R LE, antibiotic completed, s/s of infection managed. 3. Patient will understand the signs and symptoms of acute infection. Long term goals  Time frame: 8-12 weeks     1. Patient will have knowledge of the compression options and acquire a safe and                                 appropriate daytime and night time compression system to prevent                                                 re-accumulation of fluid. 4/16/2018 completed measurements for L LE custom flat knit knee high compression stocking, Juzo [id number]. 4/27/2018 Fit custom flat knit knee high compression stockings, with good initial fit. However, patient unable to don/doff in clinic. 2.  Patient or family will be able to don/doff garments independently. Garment                                 system effectiveness will be evaluated prior to discharge for better long-term                                 management and outcomes. 3.  Improvement in LLIS outcomes measure by 10-14 points upon transition to compression garment wear.   4.   To transition patient to independent restorative phase of CDT. SUBJECTIVE REPORT:  Patient states she received custom flat knit compression garment for L LE. States she tolerated R LE bandaging well. Does report fall at home, transitioning to wood floor from tile and slid in her slippers. Patient states she required the assistance of a neighbor to get up, however, reports contusion on L knee the only injury, and she believes she sustained that with attempts to get up prior to help arriving. Patient agreeable to proceeding with treatment. Pain: 2/10           Gait:  With straight cane, mod I, gait pattern normalizing with reduction of pain R LE>  ADLs: indep with don/doff of shoes. Treatment Response: Tolerated bilateral LE treatment today. Reviewed packet received at evaluation. Function: Arrives with straight cane, ambulation with antalgic gait into clinic. Bandaging intact L LE upon arrival to treatment    TREATMENT AND OBJECTIVE DATA SUMMARY:   Patient/Family Education:      Educated in skin care: Reviewed skin care principles     Educated in exercise: Instructed in exercise routine using the [employer] ball. Instructed in self MLD:   Written sequence given and reviewed with patient as well as demonstration and instruction during MLD portion of the session. Instructed in don/doff of compression system:   Multi layer bandage (MLB) donning principles and wear precautions. Therapeutic Exercise/Procedure                  Home program: Patient to perform daily to BID skin care, deep abdominal breathing, exercise routine ankle pumps/circumduction, toe curls x 10 L LE only. .   Rationale: Exercise will increase the lymph angiomotoricity and tissue pressure of the skin and thus decrease swelling.        Manual Therapy: 1:03-2:05 pm  Orthotic management: 12:46-1:00 pm 62 minutes  14 minutes     Area to decongest: Bilateral LE   Sequence used and effectiveness:  Manual therapy MLD primary lymphedema sequence performed R LE, with patient tolerating well. Applied Aquaphor and anti-itch lotion R LE using upward strokes to stimulation lymphatic vessels. R LE assessed prior to treatment, with redness subsided, hyperpigmentation persists, pain with superficial palpation resolved. Proceed with treatment. Skin/wound care/debridement: Peeling of superficial skin noted R LE, not atypical after treatment of cellulitis. Skin intact bilateral LE. Note contusion L knee and scraped. Upper/Lower extremity compression: Bilateral LE knee high  Stockinette  biafleece and cast padding foot/ankle  Biafoam  Short stretch 6, 8, 10cm  Secured with [employer] tape  H tubigrip covering tape. F tubigrip over foot  Size 13 W shoe brought by patient for wear over bandaging, with shoe donned by therapist.      Patient provided with following instructions regarding home management of compression garments:  Compression bandaging instructions:  1. Compression bandaging will be applied twice a week by therapist in clinic, with adjustments to be made at home as indicated if bandaging becomes loose or uncomfortable. 2. If tolerated, remain bandaged between appointments with therapist, removing under the following conditionsDO NOT WAIT FOR A RETURN PHONE [address]:    -Numbness/tingling in extremity different from what you have experienced without the bandages in place.  -Compromise in circulation, monitoring blood refill into toes after applying gentle pressure to toes.  -Onset of pain in extremity that is sudden and severe in nature. -Redness, warmth in limb, and/or fever, flu-like symptoms, which may indicate infection. If this occurs, call your doctor right away. If you note a sudden increase in swelling in extremity, with or without redness/warmth, go to the emergency room as soon as possible to have blood clot ruled out.       3. Compression bandaging supplies that can be laundered are the brown compression wraps and any foam applied for padding. Launder in washer/dryer with NO fabric softener, bleach, woolite. Dry on a low heat. 4. Once compression garments are ordered, compression bandaging will be continued until garments arrive for fitting. This process can take several weeks. Orthotic management: L LE custom flat knit compression stocking fit, with good initial fit noted. Patient was unable to doff garment in clinic, with decision made to continue MLB bilateral LE, with plan to work on don/doff of L LE stocking next scheduled appt. Girth/Volume measurement: See girth measurements below. Awaiting compression stocking L LE. Affected Side: bilateral LE, L LE treatment only at this time. Left (cm) Right (cm)   5th Tuberosity      24.8      Ankle      27.6      Calf      43.7      Mid Knee             Thigh             Groin             Length               TOTAL TREATMENT 79 mins       ASSESSMENT:   Treatment effectiveness and tolerance: Bilateral LE treated today as noted above with MLB, R LE MLD. Pt report venous doppler performed in PCP office was negative for DVT prior visit, reported by patient, MD office contacted. Note improvement L LE with treatment,  MLD/MLB, with L LE measured for custom flat knit knee high compression stockings prior visit with silver due to cellulitis history, with good initial fit noted, however, patient unable to don/doff garments indep in clinic. Pt reports pain remains R LE to 2/10 at this time, sensitive to touch. Progress toward goals: Ongoing. PLAN OF CARE:   Changes to the plan of care: R LE treatment continued today with s/s of infection resolved as noted above. Bilateral LE MLD continued. Assess response to bilateral LE treatment, assessing tolerance to R LE compression bandaging. Return to garment management L LE, working with patient on don/doff of garments.   Pt to notify clinic of any questions or concerns prior to next scheduled appt.  Pt advised in bandaging precautions above, to monitor R LE more closely. Otherwise, will return to treatment at this clinic as indicated.    Frequency: 2x/week           Km Ibanez, PT, CLT

## 2018-05-01 ENCOUNTER — HOSPITAL ENCOUNTER (OUTPATIENT)
Dept: PHYSICAL THERAPY | Age: 73
Discharge: HOME | End: 2018-05-01
Payer: MEDICARE

## 2018-05-01 PROCEDURE — 97760 ORTHOTIC MGMT&TRAING 1ST ENC: CPT

## 2018-05-01 PROCEDURE — 97140 MANUAL THERAPY 1/> REGIONS: CPT

## 2018-05-01 NOTE — PROGRESS NOTES
_                                    [facility]  [address] [place] [address],[address]  Christianne Brand      LYmphedema Therapy  G-Code 4/9/2018  Visit: 8    [x]        Daily note               []       30 day/10th visit progress note    NAME: Brodie Keller  Date: 5/1/2018    GOALS: Continue as at time of evaluation  Short term goals  Time frame: 2-8 weeks  1. Instruct the patient to be independent with proper skin care to prevent future skin breakdown and decrease the potential risk for infections that are associated with Lymphedema. 2. Patient will be independent with a personal lymphedema exercise program to assist with the lymphatic flow and reduce limb volume R LE by 400 mL, L LE by 600 mL from limb volume measurements at time of initial treatment. 4/24/2018 initiated compression bandaging R LE, antibiotic completed, s/s of infection managed. 5/1/2018 R LE limb volume reduced by 141. 86 mL since initial evaluation. 3. Patient will understand the signs and symptoms of acute infection. Long term goals  Time frame: 8-12 weeks     1. Patient will have knowledge of the compression options and acquire a safe and                                 appropriate daytime and night time compression system to prevent                                                 re-accumulation of fluid. 4/16/2018 completed measurements for L LE custom flat knit knee high compression stocking, Juzo [id number]. 4/27/2018 Fit custom flat knit knee high compression stockings, with good initial fit. However, patient unable to don/doff in clinic. 2.  Patient or family will be able to don/doff garments independently. Garment                                 system effectiveness will be evaluated prior to discharge for better long-term                                 management and outcomes.    3.  Improvement in LLIS outcomes measure by 10-14 points upon transition to compression garment wear. 4.   To transition patient to independent restorative phase of CDT. SUBJECTIVE REPORT:  Patient states she brought custom flat knit compression garment for L LE to work on donning/doffing again. States she did launder them. States she tolerated R LE bandaging well. Reports ambulating with straight cane as prior to fall without knee pain. Patient agreeable to proceeding with treatment today. Pain: 2/10           Gait:  With straight cane, mod I, gait pattern normalizing with reduction of pain R LE>  ADLs: indep with don/doff of shoes. Treatment Response: Tolerated bilateral LE treatment today. Reviewed packet received at evaluation. Function: Arrives with straight cane, ambulation with antalgic gait into clinic. Bandaging intact L LE upon arrival to treatment    TREATMENT AND OBJECTIVE DATA SUMMARY:   Patient/Family Education:      Educated in skin care: Reviewed skin care principles     Educated in exercise: Instructed in exercise routine using the shelia ball. Instructed in self MLD:   Written sequence given and reviewed with patient as well as demonstration and instruction during MLD portion of the session. Instructed in don/doff of compression system:   Multi layer bandage (MLB) donning principles and wear precautions. Therapeutic Exercise/Procedure                  Home program: Patient to perform daily to BID skin care, deep abdominal breathing, exercise routine ankle pumps/circumduction, toe curls x 10 L LE only. .   Rationale: Exercise will increase the lymph angiomotoricity and tissue pressure of the skin and thus decrease swelling. Manual Therapy:  12:56-2:01 pm  Orthotic management: 12:40-12:55 pm 65 minutes  15 minutes     Area to decongest: Bilateral LE   Sequence used and effectiveness:  Manual therapy MLD primary lymphedema sequence performed R LE, with patient tolerating well.      Applied Aquaphor R LE using upward strokes to stimulation lymphatic vessels, with flaky, dry skin. R LE assessed prior to treatment, with redness subsided, hyperpigmentation persists, pain with superficial palpation resolved. Proceed with treatment. Skin/wound care/debridement: Peeling of superficial skin noted R LE, not atypical after treatment of cellulitis. Skin intact bilateral LE. Note contusion L knee and scraped, patient without c/o pain with ambulation since fall. Upper/Lower extremity compression: R LE knee high  Stockinette  biafleece and cast padding foot/ankle  Biafoam  Short stretch 6, 8, 10cm  Secured with Durapore tape  H tubigrip covering tape. F tubigrip over foot  Size 13 W shoe brought by patient for wear over bandaging, with shoe donned by therapist.      Patient provided with following instructions regarding home management of compression garments:  Compression bandaging instructions:  1. Compression bandaging will be applied twice a week by therapist in clinic, with adjustments to be made at home as indicated if bandaging becomes loose or uncomfortable. 2. If tolerated, remain bandaged between appointments with therapist, removing under the following conditionsDO NOT WAIT FOR A RETURN PHONE [address]:    -Numbness/tingling in extremity different from what you have experienced without the bandages in place.  -Compromise in circulation, monitoring blood refill into toes after applying gentle pressure to toes.  -Onset of pain in extremity that is sudden and severe in nature. -Redness, warmth in limb, and/or fever, flu-like symptoms, which may indicate infection. If this occurs, call your doctor right away. If you note a sudden increase in swelling in extremity, with or without redness/warmth, go to the emergency room as soon as possible to have blood clot ruled out. 3. Compression bandaging supplies that can be laundered are the brown compression wraps and any foam applied for padding. Launder in washer/dryer with NO fabric softener, bleach, woolite. Dry on a low heat. 4. Once compression garments are ordered, compression bandaging will be continued until garments arrive for fitting. This process can take several weeks. Orthotic management: Patient able to don garment initially with min cues by therapist, and doff with use of Medi peter off. Patient able to reapply garment indep with use of her donning gloves. Patient agreeable to transition to garment L LE, with  loaned to patient until she obtains her from [employer]. Pam Petit to call patient for payment, with patient in agreement. Home management as follows:  Compression garment routine:  5. Apply daytime compression stocking to clean, dry extremity first thing in the morning, EVERY MORNING. 6. Wear compression garments until bedtime, adjusting throughout the day as needed should garment wrinkle or crease. Problem areas can be at joints, and top of garment, ensuring proximal aspect of garment positioned appropriately on extremity. 7. Launder garment nightly either by hand or washing machine in a garment bag or pillowcase. Use mild detergent with NO FABRIC SOFTENER, NO BLEACH, NO WOOLITE. Wash in cool/warm water. 8. Dry garment in dryer on low heat right side out in garment bag or pillowcase. 9. Perform skin care to extremity, cleansing skin daily with soap and water, and using low pH lotion, upward strokes to stimulate lymphatic vessels. 10. Daytime compression grments are NOT safe to sleep in, so remove at bedtime. 11. Perform exercise program with compression garments on. Signs/Symptoms of infection include:  Fever, flu-like symptoms, pain/redness/warmthin extremity, sometimes with increase in swelling present. Stop wearing your compression garment if this occurs. Call your doctor immediately or go to the Emergency room to address potential infection.   Remove compression with changes in circulation, numbness in extremity different from what you may experience when not wearing compression garment, pain, unexplained shortness of breath. Notify clinic if swelling increase noted prior to next scheduled appt. Night time compression may be needed for optimal management of lymphedema. Return in 2 weeks for follow up appt. Girth/Volume measurement: R LE limb volume repeated with reduction by 141. 86 mL. TOTAL TREATMENT 81 mins       ASSESSMENT:   Treatment effectiveness and tolerance: R LE MLD/MLB tolerated well, with limb volume improved as noted above. Pt report venous doppler performed in PCP office was negative for DVT prior visit, reported by patient, MD office contacted. Pt able to don/doff custom flat knit knee high compression stockings today with use of Medi doffing aid. Loaned do[facility] aid [facility] [place]. Tolerating MLD well R LE today without c/o. Progress toward goals: Ongoing. PLAN OF CARE:   Changes to the plan of care: R LE treatment continued today with s/s of infection resolved as noted above. L LE transitioned to wear of custom flat knit daytime compression garment. Assess response to bandaging R LE/garment wear daytime only L LE. Order any needed donning aids should this become problematic for patient. Pt to notify clinic of any questions or concerns prior to next scheduled appt. Pt advised in bandaging/compression garment precautions above, to monitor R LE more closely. Otherwise, will return to treatment at this clinic as indicated.    Frequency: 2x/week           Norman , PT, CLT

## 2018-05-04 ENCOUNTER — HOSPITAL ENCOUNTER (OUTPATIENT)
Dept: PHYSICAL THERAPY | Age: 73
Discharge: HOME | End: 2018-05-04
Payer: MEDICARE

## 2018-05-04 PROCEDURE — 97140 MANUAL THERAPY 1/> REGIONS: CPT

## 2018-05-04 PROCEDURE — 97016 VASOPNEUMATIC DEVICE THERAPY: CPT

## 2018-05-04 NOTE — PROGRESS NOTES
_                                    [facility]  [address] [place] [address],[address]  Christianne Brand      LYmphedema Therapy  G-Code 4/9/2018  Visit: 9    [x]        Daily note               []       30 day/10th visit progress note    NAME: Lottie Henry  Date: 5/4/2018    GOALS: Continue as at time of evaluation  Short term goals  Time frame: 2-8 weeks  1. Instruct the patient to be independent with proper skin care to prevent future skin breakdown and decrease the potential risk for infections that are associated with Lymphedema. 2. Patient will be independent with a personal lymphedema exercise program to assist with the lymphatic flow and reduce limb volume R LE by 400 mL, L LE by 600 mL from limb volume measurements at time of initial treatment. 4/24/2018 initiated compression bandaging R LE, antibiotic completed, s/s of infection managed. 5/1/2018 R LE limb volume reduced by 141. 86 mL since initial evaluation. 3. Patient will understand the signs and symptoms of acute infection. Long term goals  Time frame: 8-12 weeks     1. Patient will have knowledge of the compression options and acquire a safe and                                 appropriate daytime and night time compression system to prevent                                                 re-accumulation of fluid. 4/16/2018 completed measurements for L LE custom flat knit knee high compression stocking, Juzo [id number]. 4/27/2018 Fit custom flat knit knee high compression stockings, with good initial fit. However, patient unable to don/doff in clinic. 2.  Patient or family will be able to don/doff garments independently. Garment                                 system effectiveness will be evaluated prior to discharge for better long-term                                 management and outcomes.    3.  Improvement in LLIS outcomes measure by 10-14 points upon transition to compression garment wear. 4.   To transition patient to independent restorative phase of CDT. SUBJECTIVE REPORT:  Patient arrives with custom flat knit compression stocking donned L LE. Patient states she is managing L LE compression stocking well, donning indep, using Medi peter off to doff. States she would like to proceed with second stocking for L LE, exact re-order. Agreeable to proceeding with pump trial, with clearance obtained from nephrologist to proceed with pump order for home. Pain: 2/10           Gait:  With straight cane, mod I, gait pattern normalizing with reduction of pain R LE>  ADLs: indep with don/doff of shoes. Treatment Response: Tolerated R LE pump trial, 55 minutes today, without c/o. L LE lymphedema being managed well with daytime custom flat knit stockings wear. R LE bandaging continued. Reviewed packet received at evaluation. Function: Arrives with straight cane, ambulation with antalgic gait into clinic. Bandaging intact L LE upon arrival to treatment    TREATMENT AND OBJECTIVE DATA SUMMARY:   Patient/Family Education:      Educated in skin care: Reviewed skin care principles     Educated in exercise: Instructed in exercise routine using the [employer] ball. Instructed in self MLD:   Written sequence given and reviewed with patient as well as demonstration and instruction during MLD portion of the session. Instructed in don/doff of compression system:   Multi layer bandage (MLB) donning principles and wear precautions. Therapeutic Exercise/Procedure                  Home program: Patient to perform daily to BID skin care, deep abdominal breathing, exercise routine ankle pumps/circumduction, toe curls x 10 L LE only. .   Rationale: Exercise will increase the lymph angiomotoricity and tissue pressure of the skin and thus decrease swelling.        Manual Therapy:  1:41-2:10 pm  Vasopneumatic pump: 12:45-1:40 pm 29 minutes  55 minutes     Area to decongest: Bilateral LE   Sequence used and effectiveness:  Vasopneumatic pump Vasopneumatic pump trial R LE, Biomed pump, with patient tolerating sequence well. Patient was checked on throughout treatment. Skin/wound care/debridement:  Manual Therapy Peeling of superficial skin noted R LE, not atypical after treatment of cellulitis. Skin intact bilateral LE. Note contusion L knee, improving. Upper/Lower extremity compression: R LE knee high  Stockinette  biafleece and cast padding foot/ankle  Komprex II medial/lateral mid calf  Biafoam  Short stretch 6, 8, 10cm  Secured with Durapore tape  H tubigrip covering tape. F tubigrip over foot  Size 13 W shoe brought by patient for wear over bandaging, with shoe donned by therapist.      Patient provided with following instructions regarding home management of compression garments:  Compression bandaging instructions:  1. Compression bandaging will be applied twice a week by therapist in clinic, with adjustments to be made at home as indicated if bandaging becomes loose or uncomfortable. 2. If tolerated, remain bandaged between appointments with therapist, removing under the following conditionsDO NOT WAIT FOR A RETURN PHONE [address]:    -Numbness/tingling in extremity different from what you have experienced without the bandages in place.  -Compromise in circulation, monitoring blood refill into toes after applying gentle pressure to toes.  -Onset of pain in extremity that is sudden and severe in nature. -Redness, warmth in limb, and/or fever, flu-like symptoms, which may indicate infection. If this occurs, call your doctor right away. If you note a sudden increase in swelling in extremity, with or without redness/warmth, go to the emergency room as soon as possible to have blood clot ruled out. 3. Compression bandaging supplies that can be laundered are the brown compression wraps and any foam applied for padding. Launder in washer/dryer with NO fabric softener, bleach, woolite. Dry on a low heat. 4. Once compression garments are ordered, compression bandaging will be continued until garments arrive for fitting. This process can take several weeks. Orthotic management: Ordered second stocking L LE per patient request, with order sent to [facility]. To continue home management as follows:  Compression garment routine:  1. Apply daytime compression stocking to clean, dry extremity first thing in the morning, EVERY MORNING. 2. Wear compression garments until bedtime, adjusting throughout the day as needed should garment wrinkle or crease. Problem areas can be at joints, and top of garment, ensuring proximal aspect of garment positioned appropriately on extremity. 3. Launder garment nightly either by hand or washing machine in a garment bag or pillowcase. Use mild detergent with NO FABRIC SOFTENER, NO BLEACH, NO WOOLITE. Wash in cool/warm water. 4. Dry garment in dryer on low heat right side out in garment bag or pillowcase. 5. Perform skin care to extremity, cleansing skin daily with soap and water, and using low pH lotion, upward strokes to stimulate lymphatic vessels. 6. Daytime compression grments are NOT safe to sleep in, so remove at bedtime. 7. Perform exercise program with compression garments on. Signs/Symptoms of infection include:  Fever, flu-like symptoms, pain/redness/warmthin extremity, sometimes with increase in swelling present. Stop wearing your compression garment if this occurs. Call your doctor immediately or go to the Emergency room to address potential infection. Remove compression with changes in circulation, numbness in extremity different from what you may experience when not wearing compression garment, pain, unexplained shortness of breath. Notify clinic if swelling increase noted prior to next scheduled appt.   Night time compression may be needed for optimal management of lymphedema. Return in 2 weeks for follow up appt. Girth/Volume measurement: R LE girth measurements as noted below. Affected Side:R LE   Left (cm) Right (cm)   5th Tuberosity      24.2      Ankle      26.2      Calf      40.2      Mid Knee             Thigh             Groin             Length               TOTAL TREATMENT 85 mins       ASSESSMENT:   Treatment effectiveness and tolerance: R LE MLD/MLB tolerated well, with limb volume improved as noted above. Pt report venous doppler performed in PCP office was negative for DVT prior visit, reported by patient, MD office contacted. Pt able to don/doff custom flat knit knee high compression stockings today with use of Medi doffing aid. Loaned doffing aid until she receives hers from [employer]. Tolerating MLD well R LE today without c/o. Patient received DR ARN[facility], doing well with home management. Second custom stocking order placed L LE per patient. Progress toward goals: Ongoing. PLAN OF CARE:   Changes to the plan of care: R LE treatment continued today with s/s of infection resolved as noted above. L LE home management of custom flat knit daytime compression garment to continue. Assess response to bandaging R LE/garment wear daytime only L LE. Arnold aid received. Pt to notify clinic of any questions or concerns prior to next scheduled appt. Pt advised in bandaging/compression garment precautions above, to monitor R LE more closely. Otherwise, will return to treatment at this clinic as indicated. Plan to complete measurements measurements for R LE stocking next week.      Frequency: 2x/week           Allyson Man PT, CLT

## 2018-05-08 ENCOUNTER — HOSPITAL ENCOUNTER (OUTPATIENT)
Dept: PHYSICAL THERAPY | Age: 73
Discharge: HOME | End: 2018-05-08
Payer: MEDICARE

## 2018-05-08 PROCEDURE — G8990 OTHER PT/OT CURRENT STATUS: HCPCS

## 2018-05-08 PROCEDURE — G8991 OTHER PT/OT GOAL STATUS: HCPCS

## 2018-05-08 PROCEDURE — 97140 MANUAL THERAPY 1/> REGIONS: CPT

## 2018-05-08 PROCEDURE — 97016 VASOPNEUMATIC DEVICE THERAPY: CPT

## 2018-05-08 NOTE — PROGRESS NOTES
_                                    Sandy Hooper 73 and Domenic 49  [facility]  Christianne Brand 17      LYmphedema Therapy  G-Code   Visit: 10    [x]        Daily note               []       30 day/10th visit progress note    NAME: Marena Bumpers  Date: 5/8/2018    GOALS: Continue as at time of evaluation  Short term goals  Time frame: 2-8 weeks  1. Instruct the patient to be independent with proper skin care to prevent future skin breakdown and decrease the potential risk for infections that are associated with Lymphedema. 2. Patient will be independent with a personal lymphedema exercise program to assist with the lymphatic flow and reduce limb volume R LE by 400 mL, L LE by 600 mL from limb volume measurements at time of initial treatment. 4/24/2018 initiated compression bandaging R LE, antibiotic completed, s/s of infection managed. 5/1/2018 R LE limb volume reduced by 141. 86 mL since initial evaluation. 3. Patient will understand the signs and symptoms of acute infection. Long term goals  Time frame: 8-12 weeks     1. Patient will have knowledge of the compression options and acquire a safe and                                 appropriate daytime and night time compression system to prevent                                                 re-accumulation of fluid. 4/16/2018 completed measurements for L LE custom flat knit knee high compression stocking, Juzo [id number]. 4/27/2018 Fit custom flat knit knee high compression stockings, with good initial fit. However, patient unable to don/doff in clinic. 5/8/2018 completed measurements for custom flat knit R LE knee high. 2.  Patient or family will be able to don/doff garments independently. Garment                                 system effectiveness will be evaluated prior to discharge for better long-term                                 management and outcomes.    3.  Improvement in LLIS outcomes measure by 10-14 points upon transition to compression garment wear. 4.   To transition patient to independent restorative phase of CDT. SUBJECTIVE REPORT:  Patient arrives with custom flat knit compression stocking donned L LE. Patient states she is continuing to manage L LE compression stocking well, donning indep, using Medi peter off to doff. Agreeable to proceeding with continuing pump trial, with clearance obtained from nephrologist to proceed with pump order for home. Pain: 2/10           Gait:  With straight cane, mod I, gait pattern normalizing with reduction of pain R LE>  ADLs: indep with don/doff of shoes. Treatment Response: Tolerated R LE pump trial, 60 minutes today, without c/o. L LE lymphedema being managed well with daytime custom flat knit stockings wear. R LE bandaging continued. Completed measurements R LE custom flat knit compression stocking, knee high. Reviewed packet received at evaluation. Function: In compliance with CMSs Claims Based Outcome Reporting, the following G-code set was chosen for this patient based on their primary functional limitation being treated: The outcome measure chosen to determine the severity of the functional limitation was the LLIS with a score of 21/72 which was correlated with the impairment scale. ? Other PT/OT Primary Functional Limitations:     - CURRENT STATUS: CJ - 20%-39% impaired, limited or restricted    - GOAL STATUS: CI - 1%-19% impaired, limited or restricted    - D/C STATUS:  ---------------To be determined---------------             TREATMENT AND OBJECTIVE DATA SUMMARY:         Therapeutic Exercise/Procedure                  Home program: Patient to perform daily to BID skin care, deep abdominal breathing, exercise routine ankle pumps/circumduction, toe curls x 10 L LE only. .   Rationale: Exercise will increase the lymph angiomotoricity and tissue pressure of the skin and thus decrease swelling. Manual Therapy:  12:16-12:46 pm  Vasopneumatic pump: 11:15 am-12:15 pm 30 minutes  60 minutes     Area to decongest: Bilateral LE   Sequence used and effectiveness:  Vasopneumatic pump Vasopneumatic pump trial R LE, Biomed pump, with patient tolerating sequence well. Patient without c/o. Skin/wound care/debridement:  Manual Therapy Peeling of superficial skin noted R LE, not atypical after treatment of cellulitis. Skin intact bilateral LE. Note contusion L knee, improving. Upper/Lower extremity compression: Note R LE lymphedema significantly improved,with decision made to proceed with measurements for custom flat knit knee high stocking. Silver in garment recommended, with patient in agreement. R LE knee high  Stockinette  biafleece and cast padding foot/ankle  Komprex II medial/lateral mid calf  Biafoam  Short stretch 6, 8, 10cm  Secured with Durapore tape  H tubigrip covering tape. F tubigrip over foot  Size 13 W shoe brought by patient for wear over bandaging, with shoe donned by therapist.      Patient provided with following instructions regarding home management of compression garments:  Compression bandaging instructions:  1. Compression bandaging will be applied twice a week by therapist in clinic, with adjustments to be made at home as indicated if bandaging becomes loose or uncomfortable. 2. If tolerated, remain bandaged between appointments with therapist, removing under the following conditionsDO NOT WAIT FOR A RETURN PHONE Sailaja 69:    -Numbness/tingling in extremity different from what you have experienced without the bandages in place.  -Compromise in circulation, monitoring blood refill into toes after applying gentle pressure to toes.  -Onset of pain in extremity that is sudden and severe in nature. -Redness, warmth in limb, and/or fever, flu-like symptoms, which may indicate infection.   If this occurs, call your doctor right away.  If you note a sudden increase in swelling in extremity, with or without redness/warmth, go to the emergency room as soon as possible to have blood clot ruled out. 3. Compression bandaging supplies that can be laundered are the brown compression wraps and any foam applied for padding. Launder in washer/dryer with NO fabric softener, bleach, woolite. Dry on a low heat. 4. Once compression garments are ordered, compression bandaging will be continued until garments arrive for fitting. This process can take several weeks. Ordered second stocking L LE per patient request, with order sent to [facility]. To continue home management as follows:  Compression garment routine:  1. Apply daytime compression stocking to clean, dry extremity first thing in the morning, EVERY MORNING. 2. Wear compression garments until bedtime, adjusting throughout the day as needed should garment wrinkle or crease. Problem areas can be at joints, and top of garment, ensuring proximal aspect of garment positioned appropriately on extremity. 3. Launder garment nightly either by hand or washing machine in a garment bag or pillowcase. Use mild detergent with NO FABRIC SOFTENER, NO BLEACH, NO WOOLITE. Wash in cool/warm water. 4. Dry garment in dryer on low heat right side out in garment bag or pillowcase. 5. Perform skin care to extremity, cleansing skin daily with soap and water, and using low pH lotion, upward strokes to stimulate lymphatic vessels. 6. Daytime compression grments are NOT safe to sleep in, so remove at bedtime. 7. Perform exercise program with compression garments on. Signs/Symptoms of infection include:  Fever, flu-like symptoms, pain/redness/warmthin extremity, sometimes with increase in swelling present. Stop wearing your compression garment if this occurs. Call your doctor immediately or go to the Emergency room to address potential infection.   Remove compression with changes in circulation, numbness in extremity different from what you may experience when not wearing compression garment, pain, unexplained shortness of breath. Notify clinic if swelling increase noted prior to next scheduled appt. Night time compression may be needed for optimal management of lymphedema. Return in 2 weeks for follow up appt. Girth/Volume measurement: R LE girth measurements as noted below. 5/4/2018 5/8/2018      TOTAL TREATMENT 91 mins       ASSESSMENT:   Treatment effectiveness and tolerance: R LE MLD/MLB tolerated well, measurements completed for R LE knee high custom flat knit compression stockings, Juzo [id number]. Pt report venous doppler performed in PCP office was negative for DVT prior visit, reported by patient, MD office contacted. Pt able to don/doff custom flat knit knee high compression stocking L LE at home, arriving with garment donned. Returned  doffing aid since she received hers from [employer]. Tolerating pump trial well R LE today without c/o. May progress to bilateral LE pump sequence next visit. Second custom stocking order placed L LE per patient previous visit, [facility] Progress toward goals: Ongoing. Updated as above. PLAN OF CARE:   Changes to the plan of care: R LE treatment continued today with s/s of infection resolved as noted above. L LE home management of custom flat knit daytime compression garment to continue. Assess response to bandaging R LE/garment wear daytime only L LE. [place] aid received. Pt to notify clinic of any questions or concerns prior to next scheduled appt. Pt advised in bandaging/compression garment precautions above, to monitor R LE more closely. Otherwise, will return to treatment at this clinic as indicated. Fit R LE custom flat knit knee high stocking upon receiving.    Frequency: 2x/week           Tereza Jaffe, PT, CLT

## 2018-05-11 ENCOUNTER — HOSPITAL ENCOUNTER (OUTPATIENT)
Dept: PHYSICAL THERAPY | Age: 73
Discharge: HOME | End: 2018-05-11
Payer: MEDICARE

## 2018-05-11 PROCEDURE — 97140 MANUAL THERAPY 1/> REGIONS: CPT

## 2018-05-11 PROCEDURE — G8990 OTHER PT/OT CURRENT STATUS: HCPCS

## 2018-05-11 PROCEDURE — 97016 VASOPNEUMATIC DEVICE THERAPY: CPT

## 2018-05-11 PROCEDURE — G8991 OTHER PT/OT GOAL STATUS: HCPCS

## 2018-05-11 NOTE — PROGRESS NOTES
_                                    Sandy Hooper 73 and [address]omenic 49  [facility] 66  Christianne Brand 17      LYmphedema Therapy  G-Code   Visit: 10    [x]        Daily note               []       30 day/10th visit progress note    NAME: Heather Mcintyre  Date: 5/8/2018    GOALS: Continue as at time of evaluation  Short term goals  Time frame: 2-8 weeks  1. Instruct the patient to be independent with proper skin care to prevent future skin breakdown and decrease the potential risk for infections that are associated with Lymphedema. 2. Patient will be independent with a personal lymphedema exercise program to assist with the lymphatic flow and reduce limb volume R LE by 400 mL, L LE by 600 mL from limb volume measurements at time of initial treatment. 4/24/2018 initiated compression bandaging R LE, antibiotic completed, s/s of infection managed. 5/1/2018 R LE limb volume reduced by 141. 86 mL since initial evaluation. 3. Patient will understand the signs and symptoms of acute infection. Long term goals  Time frame: 8-12 weeks     1. Patient will have knowledge of the compression options and acquire a safe and                                 appropriate daytime and night time compression system to prevent                                                 re-accumulation of fluid. 4/16/2018 completed measurements for L LE custom flat knit knee high compression stocking, Juzo [id number]. 4/27/2018 Fit custom flat knit knee high compression stockings, with good initial fit. However, patient unable to don/doff in clinic. 5/8/2018 completed measurements for custom flat knit R LE knee high. 2.  Patient or family will be able to don/doff garments independently. Garment                                 system effectiveness will be evaluated prior to discharge for better long-term                                 management and outcomes.    3.  Improvement in LLIS outcomes measure by 10-14 points upon transition to compression garment wear. 4.   To transition patient to independent restorative phase of CDT. SUBJECTIVE REPORT:  Patient arrives with custom flat knit compression stocking donned L LE. Patient states she is continuing to manage L LE compression stocking well, donning indep, using Medi peter off to doff. Agreeable to proceeding with continuing pump trial, with clearance obtained from nephrologist to proceed with pump order for home. Pain: 2/10           Gait:  With straight cane, mod I, gait pattern normalizing with reduction of pain R LE>  ADLs: indep with don/doff of shoes. Treatment Response: Tolerated R LE pump trial, 60 minutes today, without c/o. L LE lymphedema being managed well with daytime custom flat knit stockings wear. R LE bandaging continued. Completed measurements R LE custom flat knit compression stocking, knee high. Reviewed packet received at evaluation. Function: In compliance with CMSs Claims Based Outcome Reporting, the following G-code set was chosen for this patient based on their primary functional limitation being treated: The outcome measure chosen to determine the severity of the functional limitation was the LLIS with a score of 21/72 which was correlated with the impairment scale. ? Other PT/OT Primary Functional Limitations:     - CURRENT STATUS: CJ - 20%-39% impaired, limited or restricted    - GOAL STATUS: CI - 1%-19% impaired, limited or restricted    - D/C STATUS:  ---------------To be determined---------------             TREATMENT AND OBJECTIVE DATA SUMMARY:         Therapeutic Exercise/Procedure                  Home program: Patient to perform daily to BID skin care, deep abdominal breathing, exercise routine ankle pumps/circumduction, toe curls x 10 L LE only. .   Rationale: Exercise will increase the lymph angiomotoricity and tissue pressure of the skin and thus decrease swelling. Manual Therapy:  1:01-1:38 pm  Vasopneumatic pump: 12:10-1:00 pm 30 minutes  50 minutes     Area to decongest: Bilateral LE   Sequence used and effectiveness:  Vasopneumatic pump Vasopneumatic pump trial R LE, Biomed pump, with patient tolerating sequence well. Patient without c/o. Skin/wound care/debridement:  Manual Therapy Peeling of superficial skin noted R LE, not atypical after treatment of cellulitis. Skin intact bilateral LE. Note contusion L knee, improving. Applied Aquaphor/anti-itch lotion mixed R LE, upward strokes. Upper/Lower extremity compression:     R LE knee high  Stockinette  biafleece and cast padding foot/ankle  Komprex II medial/lateral mid calf  Biafoam  Short stretch 6, 8, 10cm  Secured with Durapore tape  H tubigrip covering tape. F tubigrip over foot  Size 13 W shoe brought by patient for wear over bandaging, with shoe donned by therapist.      Patient provided with following instructions regarding home management of compression garments:  Compression bandaging instructions:  1. Compression bandaging will be applied twice a week by therapist in clinic, with adjustments to be made at home as indicated if bandaging becomes loose or uncomfortable. 2. If tolerated, remain bandaged between appointments with therapist, removing under the following conditionsDO NOT WAIT FOR A RETURN PHONE Sailaja Gutiérrez:    -Numbness/tingling in extremity different from what you have experienced without the bandages in place.  -Compromise in circulation, monitoring blood refill into toes after applying gentle pressure to toes.  -Onset of pain in extremity that is sudden and severe in nature. -Redness, warmth in limb, and/or fever, flu-like symptoms, which may indicate infection. If this occurs, call your doctor right away.   If you note a sudden increase in swelling in extremity, with or without redness/warmth, go to the emergency room as soon as possible to have blood clot ruled out. 3. Compression bandaging supplies that can be laundered are the brown compression wraps and any foam applied for padding. Launder in washer/dryer with NO fabric softener, bleach, woolite. Dry on a low heat. 4. Once compression garments are ordered, compression bandaging will be continued until garments arrive for fitting. This process can take several weeks. Kinesiotape applied L LE to address contusion, assisting with lymphatic and venous return. [place] applied inguinal nodes, fan toward medial and proximal knee, 25% tension. Patient advised to remove KT with redness, irritation, itching or within 3-5 days. Advised to apply lotion/soap and water to loosen adhesive for easier removal.                         Ordered second stocking L LE per patient request, with order sent to [facility]. To continue home management as follows:  Compression garment routine:  1. Apply daytime compression stocking to clean, dry extremity first thing in the morning, EVERY MORNING. 2. Wear compression garments until bedtime, adjusting throughout the day as needed should garment wrinkle or crease. Problem areas can be at joints, and top of garment, ensuring proximal aspect of garment positioned appropriately on extremity. 3. Launder garment nightly either by hand or washing machine in a garment bag or pillowcase. Use mild detergent with NO FABRIC SOFTENER, NO BLEACH, NO WOOLITE. Wash in cool/warm water. 4. Dry garment in dryer on low heat right side out in garment bag or pillowcase. 5. Perform skin care to extremity, cleansing skin daily with soap and water, and using low pH lotion, upward strokes to stimulate lymphatic vessels. 6. Daytime compression grments are NOT safe to sleep in, so remove at bedtime. 7. Perform exercise program with compression garments on.   Signs/Symptoms of infection include:  Fever, flu-like symptoms, pain/redness/warmthin extremity, sometimes with increase in swelling present. Stop wearing your compression garment if this occurs. Call your doctor immediately or go to the Emergency room to address potential infection. Remove compression with changes in circulation, numbness in extremity different from what you may experience when not wearing compression garment, pain, unexplained shortness of breath. Notify clinic if swelling increase noted prior to next scheduled appt. Night time compression may be needed for optimal management of lymphedema. Return in 2 weeks for follow up appt. Girth/Volume measurement: Deferred      TOTAL TREATMENT 91 mins       ASSESSMENT:   Treatment effectiveness and tolerance: R LE MLD/MLB tolerated well, measurements completed for R LE knee high custom flat knit compression stockings, Juzo [id number], previous visit. Pt reported prior visit venous doppler performed in PCP office was negative for DVT prior visit, reported by patient, MD office contacted. Pt able to don/doff custom flat knit knee high compression stocking L LE at home, arriving with garment donned. Contusion from fall L knee managed with KT application today to facilitate lymphatic and venous return. Tolerating pump trial well R LE today without c/o. May progress to bilateral LE pump sequence next visit. Second custom stocking order placed L LE per patient previous visit, with R LE stocking order sent to [facility] processing order for vasopneumatic pump. Progress toward goals: Ongoing. Updated as above. PLAN OF CARE:   Changes to the plan of care: R LE treatment continued today with s/s of infection resolved as noted above. L LE home management of custom flat knit daytime compression garment to continue. Assess response to bandaging R LE/garment wear daytime only L LE, and KT application L thigh/knee. [place] aid received. Pt to notify clinic of any questions or concerns prior to next scheduled appt.   Pt advised in bandaging/compression garment precautions above, to monitor R LE more closely. Otherwise, will return to treatment at this clinic as indicated. Fit R LE custom flat knit knee high stocking upon receiving.    Frequency: 2x/week           Virginia Amezquita, PT, CLT

## 2018-05-15 ENCOUNTER — HOSPITAL ENCOUNTER (OUTPATIENT)
Dept: PHYSICAL THERAPY | Age: 73
Discharge: HOME | End: 2018-05-15
Payer: MEDICARE

## 2018-05-15 PROCEDURE — 97530 THERAPEUTIC ACTIVITIES: CPT

## 2018-05-15 PROCEDURE — 97140 MANUAL THERAPY 1/> REGIONS: CPT

## 2018-05-15 NOTE — PROGRESS NOTES
_                                    [address] 73 [address]  [facility]  [address]      LYmphedema Therapy  G-Code   Visit: 10    [x]        Daily note               []       30 day/10th visit progress note    NAME: Josy Hooks  Date: 5/8/2018    GOALS: Continue as at time of evaluation  Short term goals  Time frame: 2-8 weeks  1. Instruct the patient to be independent with proper skin care to prevent future skin breakdown and decrease the potential risk for infections that are associated with Lymphedema. 2. Patient will be independent with a personal lymphedema exercise program to assist with the lymphatic flow and reduce limb volume R LE by 400 mL, L LE by 600 mL from limb volume measurements at time of initial treatment. 4/24/2018 initiated compression bandaging R LE, antibiotic completed, s/s of infection managed. 5/1/2018 R LE limb volume reduced by 141. 86 mL since initial evaluation. 3. Patient will understand the signs and symptoms of acute infection. Long term goals  Time frame: 8-12 weeks     1. Patient will have knowledge of the compression options and acquire a safe and                                 appropriate daytime and night time compression system to prevent                                                 re-accumulation of fluid. 4/16/2018 completed measurements for L LE custom flat knit knee high compression stocking, Juzo [id number]. 4/27/2018 Fit custom flat knit knee high compression stockings, with good initial fit. However, patient unable to don/doff in clinic. 5/8/2018 completed measurements for custom flat knit R LE knee high. 2.  Patient or family will be able to don/doff garments independently. Garment                                 system effectiveness will be evaluated prior to discharge for better long-term                                 management and outcomes.    3.  Improvement in LLIS outcomes measure by 10-14 points upon transition to compression garment wear. 4.   To transition patient to independent restorative phase of CDT. SUBJECTIVE REPORT:  Patient arrives with custom flat knit compression stocking donned L LE. Patient states she is continuing to manage L LE compression stocking well, donning indep, using Medi peter off to doff. Agreeable to proceeding with continuing pump trial, with clearance obtained from nephrologist to proceed with pump order for home. Pain: 2/10           Gait:  With straight cane, mod I, gait pattern normalizing with reduction of pain R LE>  ADLs: indep with don/doff of shoes. Treatment Response: Tolerated R LE pump trial, 60 minutes today, without c/o. L LE lymphedema being managed well with daytime custom flat knit stockings wear. R LE bandaging continued. Completed measurements R LE custom flat knit compression stocking, knee high. Reviewed packet received at evaluation. Function: In compliance with CMSs Claims Based Outcome Reporting, the following G-code set was chosen for this patient based on their primary functional limitation being treated: The outcome measure chosen to determine the severity of the functional limitation was the LLIS with a score of 21/72 which was correlated with the impairment scale. ? Other PT/OT Primary Functional Limitations:     - CURRENT STATUS: CJ - 20%-39% impaired, limited or restricted    - GOAL STATUS: CI - 1%-19% impaired, limited or restricted    - D/C STATUS:  ---------------To be determined---------------             TREATMENT AND OBJECTIVE DATA SUMMARY:         Therapeutic Exercise/Procedure                  Home program: Patient to perform daily to BID skin care, deep abdominal breathing, exercise routine ankle pumps/circumduction, toe curls x 10 L LE only. .   Rationale: Exercise will increase the lymph angiomotoricity and tissue pressure of the skin and thus decrease swelling. Manual Therapy:  11:01 am-12:00 pm  Therapeutic activity: 12:02-12:28    59 minutes  26 minutes     Area to decongest: Bilateral LE   Sequence used and effectiveness:  Manual therapy MLD sequence, L LE thigh/knee, R LE full leg sequence. Skin/wound care/debridement:   Peeling of superficial skin noted R LE, not atypical after treatment of cellulitis. Skin intact bilateral LE. Note contusion L knee, improving. Applied Aquaphor/anti-itch lotion mixed R LE, upward strokes. Upper/Lower extremity compression:  Therapeutic activity     R LE knee high  Stockinette  biafleece and cast padding foot/ankle  Komprex II medial/lateral mid calf  Biafoam  Short stretch 6, 8, 10cm  Secured with Dur[place] tape  H tubigrip covering tape. F tubigrip over foot  Size 13 W shoe brought by patient for wear over bandaging, with shoe donned by therapist.      Patient provided with following instructions regarding home management of compression garments:  Compression bandaging instructions:  1. Compression bandaging will be applied twice a week by therapist in clinic, with adjustments to be made at home as indicated if bandaging becomes loose or uncomfortable. 2. If tolerated, remain bandaged between appointments with therapist, removing under the following conditionsDO NOT WAIT FOR A RETURN PHONE [address]:    -Numbness/tingling in extremity different from what you have experienced without the bandages in place.  -Compromise in circulation, monitoring blood refill into toes after applying gentle pressure to toes.  -Onset of pain in extremity that is sudden and severe in nature. -Redness, warmth in limb, and/or fever, flu-like symptoms, which may indicate infection. If this occurs, call your doctor right away. If you note a sudden increase in swelling in extremity, with or without redness/warmth, go to the emergency room as soon as possible to have blood clot ruled out. 3. Compression bandaging supplies that can be laundered are the brown compression wraps and any foam applied for padding. Launder in washer/dryer with NO fabric softener, bleach, woolite. Dry on a low heat. 4. Once compression garments are ordered, compression bandaging will be continued until garments arrive for fitting. This process can take several weeks. Kinesiotape applied L LE to address contusion, assisting with lymphatic and venous return. Hull applied inguinal nodes, fan over patellar region, 25% tension. Patient advised to remove KT with redness, irritation, itching or within 3-5 days. Advised to apply lotion/soap and water to loosen adhesive for easier removal.                         Ordered second stocking L LE per patient request, with order sent to [facility]. To continue home management as follows:  Compression garment routine:  1. Apply daytime compression stocking to clean, dry extremity first thing in the morning, EVERY MORNING. 2. Wear compression garments until bedtime, adjusting throughout the day as needed should garment wrinkle or crease. Problem areas can be at joints, and top of garment, ensuring proximal aspect of garment positioned appropriately on extremity. 3. Launder garment nightly either by hand or washing machine in a garment bag or pillowcase. Use mild detergent with NO FABRIC SOFTENER, NO BLEACH, NO WOOLITE. Wash in cool/warm water. 4. Dry garment in dryer on low heat right side out in garment bag or pillowcase. 5. Perform skin care to extremity, cleansing skin daily with soap and water, and using low pH lotion, upward strokes to stimulate lymphatic vessels. 6. Daytime compression grments are NOT safe to sleep in, so remove at bedtime. 7. Perform exercise program with compression garments on. Signs/Symptoms of infection include:  Fever, flu-like symptoms, pain/redness/warmthin extremity, sometimes with increase in swelling present.   Stop wearing your compression garment if this occurs. Call your doctor immediately or go to the Emergency room to address potential infection. Remove compression with changes in circulation, numbness in extremity different from what you may experience when not wearing compression garment, pain, unexplained shortness of breath. Notify clinic if swelling increase noted prior to next scheduled appt. Night time compression may be needed for optimal management of lymphedema. Return in 2 weeks for follow up appt. Girth/Volume measurement: Deferred      TOTAL TREATMENT 87 mins       ASSESSMENT:   Treatment effectiveness and tolerance: R LE and L LE thigh and knee MLD/R MLB tolerated well, measurements completed for R LE knee high custom flat knit compression stockings, Juzo [id number], previous visit. Pt reported prior visit venous doppler performed in PCP office was negative for DVT prior visit, reported by patient, MD office contacted. Pt able to don/doff custom flat knit knee high compression stocking L LE at home, arriving with garment donned, second garment received with good fit. Contusion from fall L knee managed with KT application/MLD today to facilitate lymphatic and venous return. Tolerating pump trial well R LE today without c/o. May progress to bilateral LE pump sequence next visit. [facility] processing order for vasopneumatic pump. Progress toward goals: Ongoing. Updated as above. PLAN OF CARE:   Changes to the plan of care: Bilateral LE treatment continued today with s/s of infection resolved as noted above. L LE home management of custom flat knit daytime compression garment to continue. Assess response to bandaging R LE/garment wear daytime only L LE, and KT application L thigh/knee. Bicknell aid received. Pt to notify clinic of any questions or concerns prior to next scheduled appt. Pt advised in bandaging/compression garment precautions above, to monitor R LE more closely.  Otherwise, will return to treatment at this clinic as indicated. Fit R LE custom flat knit knee high stocking upon receiving.    Frequency: 2x/week           Coco Robertson, PT, CLT

## 2018-05-18 ENCOUNTER — HOSPITAL ENCOUNTER (OUTPATIENT)
Dept: PHYSICAL THERAPY | Age: 73
Discharge: HOME | End: 2018-05-18
Payer: MEDICARE

## 2018-05-18 NOTE — PROGRESS NOTES
[facility] [address] [facility]  [address] [facility]  [address]  [address]      Lymphedema Therapy      5/18/2018:  Lottie Henry was not seen on this date for physical therapy for the following reasons:    [x]         Patient called to cancel the visit for the following reasons: Patient called and says her transportation did not show up today and when she called, they said they would not be able to pick her up for appt today. Patient says she remains bandaged on right LE and has stocking on left LE. Patient has received new stocking for R LE but prefers to leave bandages on until she returns on 5/22 for garment fitting with primary therapist.   []         Patient missed the visit and did not call to cancel.     Emani Person, PT

## 2018-05-22 ENCOUNTER — HOSPITAL ENCOUNTER (OUTPATIENT)
Dept: PHYSICAL THERAPY | Age: 73
Discharge: HOME | End: 2018-05-22
Payer: MEDICARE

## 2018-05-22 PROCEDURE — 97760 ORTHOTIC MGMT&TRAING 1ST ENC: CPT

## 2018-05-22 PROCEDURE — 97140 MANUAL THERAPY 1/> REGIONS: CPT

## 2018-05-22 NOTE — PROGRESS NOTES
_                                    [facility]  [address] [place] [address],[address]  Christianne Brand      LYmphedema Therapy  G-Code 5/8/2018   Visit: 13    [x]        Daily note               []       30 day/10th visit progress note    NAME: Jayson Brandon  Date: 5/22/2018    GOALS: Continue as at time of evaluation  Short term goals  Time frame: 2-8 weeks  1. Instruct the patient to be independent with proper skin care to prevent future skin breakdown and decrease the potential risk for infections that are associated with Lymphedema. 5/22/2018 goal met. 2. Patient will be independent with a personal lymphedema exercise program to assist with the lymphatic flow and reduce limb volume R LE by 400 mL, L LE by 600 mL from limb volume measurements at time of initial treatment. 4/24/2018 initiated compression bandaging R LE, antibiotic completed, s/s of infection managed. 5/1/2018 R LE limb volume reduced by 141. 86 mL since initial evaluation. 5/22/2018 R LE reduced by 204.36 mL today, ongoing. 3. Patient will understand the signs and symptoms of acute infection. Long term goals  Time frame: 8-12 weeks     1. Patient will have knowledge of the compression options and acquire a safe and                                 appropriate daytime and night time compression system to prevent                                                 re-accumulation of fluid. 4/16/2018 completed measurements for L LE custom flat knit knee high compression stocking, Juzo [id number]. 4/27/2018 Fit custom flat knit knee high compression stockings, with good initial fit. However, patient unable to don/doff in clinic. 5/8/2018 completed measurements for custom flat knit R LE knee high. 5/22/2018 fit R LE knee high compression stocking, with good fit of bilateral knee high compression stockings established.   2.  Patient or family will be able to don/doff garments independently. Garment                                 system effectiveness will be evaluated prior to discharge for better long-term                                 management and outcomes. 5/22/2018 goal met. 3.  Improvement in LLIS outcomes measure by 10-14 points upon transition to compression garment wear. 5/22/2018 goal met. 4.   To transition patient to independent restorative phase of CDT. SUBJECTIVE REPORT:  Patient arrives with custom flat knit compression stocking donned L LE. Patient states she is continuing to manage L LE compression stocking well, donning indep, using Medi peter off to doff. Unaware of small skin tear anterior aspect of L lower leg, appearing to have occurred when patient donned garment at home prior to appt. Agreeable to proceeding with R LE knee high custom flat knit compression stocking fit. Pt reports she is performing vasopneumatic pump use L LE without c/o. To initiate R LE pump sequence with successful fit of compression garment today. Pain: 2/10           Gait:  With straight cane, mod I, gait pattern normalizing with reduction of pain R LE>  ADLs: indep with don/doff of shoes. Treatment Response: Tolerated R LE pump trial, 60 minutes today, without c/o. L LE lymphedema being managed well with daytime custom flat knit stockings wear. R LE bandaging continued. Completed measurements R LE custom flat knit compression stocking, knee high. Reviewed packet received at evaluation. Function: 5/8/2018    In compliance with CMSs Claims Based Outcome Reporting, the following G-code set was chosen for this patient based on their primary functional limitation being treated: The outcome measure chosen to determine the severity of the functional limitation was the LLIS with a score of 21/72 which was correlated with the impairment scale. ?  Other PT/OT Primary Functional Limitations:     - CURRENT STATUS: CJ - 20%-39% impaired, limited or restricted    - GOAL STATUS: CI - 1%-19% impaired, limited or restricted    - D/C STATUS:  ---------------To be determined---------------             TREATMENT AND OBJECTIVE DATA SUMMARY:         Therapeutic Exercise/Procedure                  Home program: Patient to perform daily to BID skin care, deep abdominal breathing, exercise routine ankle pumps/circumduction, toe curls x 10 L LE only. .   Rationale: Exercise will increase the lymph angiomotoricity and tissue pressure of the skin and thus decrease swelling. Manual Therapy:  12:48-1:17 pm  Therapeutic activity: 1:18-1:50 pm   29 minutes  32 minutes     Area to decongest: Bilateral LE   Sequence used and effectiveness:  Manual therapy Repeated limb volume measurements bilateral LE. Note L LE elevated by 381. 79 mL since compression garment fit, R LE reduced by 204.36 mL since initial evaluation. Limb volume discrepancy of 9.13% noted, L LE>R LE. Note small, open skin tear anterior aspect L LE. Skin in hyperpigmented, as at previous visits, with scant amount of lymphorrhea noted in area. Wound cleansed, and Mepilex Ag border applied over area. It appears that skin tear may have occurred upon garment donning this morning. Patient was instructed in s/s of infection including redness/warmth to touch/increase in pain/increase in swelling/fever/flu-like symptoms. Patient advised to monitor bilateral LE, L LE specifically due to open area, keeping open area clean and covered until scabbed over. Patient advised that, in the event of onset of s/s of infection, she should see her PCP ASAP, discontinue use of pump and wear of compression stocking on affected extremity, until limb is assessed, and if needed, oral antibiotic in the event of cellulitis diagnosis, completed.   Pt advised to launder all garments, and clean LE pump sleeve prior to resuming management of lymphedema, however, should return to clinic prior to resuming home management to ensure appropriate fit of compression garments. Pt verbalizes good understanding of instructions provided. Skin/wound care/debridement:   Peeling of superficial skin noted R LE, not atypical after treatment of cellulitis. Skin intact bilateral LE. Note contusion L knee, improving. Applied Aquaphor/anti-itch lotion mixed R LE, upward strokes. Upper/Lower extremity compression:  Orthotic management: Therapist fit R LE knee high Juzo [id number] stocking, with good initial fit noted. Pt advised to continue home management of compression stockings as noted below, now bilateral extremities. .      To continue home management as follows:  Compression garment routine:  1. Apply daytime compression stocking to clean, dry extremity first thing in the morning, EVERY MORNING. 2. Wear compression garments until bedtime, adjusting throughout the day as needed should garment wrinkle or crease. Problem areas can be at joints, and top of garment, ensuring proximal aspect of garment positioned appropriately on extremity. 3. Launder garment nightly either by hand or washing machine in a garment bag or pillowcase. Use mild detergent with NO FABRIC SOFTENER, NO BLEACH, NO WOOLITE. Wash in cool/warm water. 4. Dry garment in dryer on low heat right side out in garment bag or pillowcase. 5. Perform skin care to extremity, cleansing skin daily with soap and water, and using low pH lotion, upward strokes to stimulate lymphatic vessels. 6. Daytime compression grments are NOT safe to sleep in, so remove at bedtime. 7. Perform exercise program with compression garments on. Signs/Symptoms of infection include:  Fever, flu-like symptoms, pain/redness/warmthin extremity, sometimes with increase in swelling present. Stop wearing your compression garment if this occurs. Call your doctor immediately or go to the Emergency room to address potential infection.   Remove compression with changes in circulation, numbness in extremity different from what you may experience when not wearing compression garment, pain, unexplained shortness of breath. Notify clinic if swelling increase noted prior to next scheduled appt. Night time compression may be needed for optimal management of lymphedema. Return in 1 weeks for follow up appt. Pt advised second stocking for R LE can be ordered based on tolerance and fit of current stocking, at next scheduled appt. TOTAL TREATMENT 62 mins       ASSESSMENT:   Treatment effectiveness and tolerance: Note effective fit of bilateral knee high closed toe Juzo [id number] compression stockings today, with initial fit of R LE stocking performed. Pt able to don/doff custom flat knit knee high compression stocking L LE at home, arriving with garment donned, second garment received with good fit. Note small skin tear anterior aspect of L LE, with patient to remove Mepilex Ag at time of shower tomorrow, assessing area upon removal of stocking tonight for any s/s of infection. Pt advised that she may require addition of night time compression garment L LE with noted increase in limb volume measurements today. Pt to return in 1 week, and repeat of volume measurements will be performed at that time. Pt to continue pump management at home, moving to bilateral LE sequence currently with fit of compression stockings. Pt verbalizes good understanding of s/s of infection, and appropriate medical management. Progress toward goals: Ongoing. Updated as above. PLAN OF CARE:   Changes to the plan of care: Bilateral LE home management of custom flat knit daytime compression garment to continue. Implement bilateral LE vasopneumatic pump sequence at home. Pt to notify clinic of any questions or concerns prior to next scheduled appt.   Pt advised in bandaging/compression garment precautions above, to monitor L LE more closely secondary to new small skin tear anterior aspect of LE. Return in 1 week for reassessment of lymphedema. If condition remains stable, anticipate ordering second stocking for R LE, and discharge, with recommended 6 month follow up.    Frequency: prn           Serena Martinez, PT, CLT

## 2018-05-25 ENCOUNTER — APPOINTMENT (OUTPATIENT)
Dept: PHYSICAL THERAPY | Age: 73
End: 2018-05-25
Payer: MEDICARE

## 2018-05-29 ENCOUNTER — HOSPITAL ENCOUNTER (OUTPATIENT)
Dept: PHYSICAL THERAPY | Age: 73
Discharge: HOME | End: 2018-05-29
Payer: MEDICARE

## 2018-05-29 PROCEDURE — 97140 MANUAL THERAPY 1/> REGIONS: CPT

## 2018-05-29 NOTE — PROGRESS NOTES
_                                    [facility]  [address] [place] [address],[address]  Christianne Brand      LYmphedema Therapy  G-Code 5/8/2018   Visit: 14    [x]        Daily note               []       30 day/10th visit progress note    NAME: Jayson Brandon  Date: 5/29/2018    GOALS: Continue as at time of evaluation  Short term goals  Time frame: 2-8 weeks  1. Instruct the patient to be independent with proper skin care to prevent future skin breakdown and decrease the potential risk for infections that are associated with Lymphedema. 5/22/2018 goal met. 2. Patient will be independent with a personal lymphedema exercise program to assist with the lymphatic flow and reduce limb volume R LE by 400 mL, L LE by 600 mL from limb volume measurements at time of initial treatment. 4/24/2018 initiated compression bandaging R LE, antibiotic completed, s/s of infection managed. 5/1/2018 R LE limb volume reduced by 141. 86 mL since initial evaluation. 5/22/2018 R LE reduced by 204.36 mL today, ongoing. 3. Patient will understand the signs and symptoms of acute infection. Long term goals  Time frame: 8-12 weeks     1. Patient will have knowledge of the compression options and acquire a safe and                                 appropriate daytime and night time compression system to prevent                                                 re-accumulation of fluid. 4/16/2018 completed measurements for L LE custom flat knit knee high compression stocking, Juzo [id number]. 4/27/2018 Fit custom flat knit knee high compression stockings, with good initial fit. However, patient unable to don/doff in clinic. 5/8/2018 completed measurements for custom flat knit R LE knee high. 5/22/2018 fit R LE knee high compression stocking, with good fit of bilateral knee high compression stockings established.  5/29/2018 patient unable to tolerate wear of R LE knee high custom flat knit compression stocking for 2 days, stating she believes cause is related to gout. MD appt following her visit here. 2.  Patient or family will be able to don/doff garments independently. Garment                                 system effectiveness will be evaluated prior to discharge for better long-term                                 management and outcomes. 5/22/2018 goal met. 3.  Improvement in LLIS outcomes measure by 10-14 points upon transition to compression garment wear. 5/22/2018 goal met. 4.   To transition patient to independent restorative phase of CDT. SUBJECTIVE REPORT:  Patient arrives with custom flat knit compression stocking donned L LE only. States she was wearing R LE stocking until 5/29, then discontinued secondary to R lower leg/foot pain, which she relates to gout, however, is also concerned about potential for cellulitis. Patient states she discontinued use of vasopneumatic pump R LE at time that she discontinued compression garment wear. To see PCP following appt here. L LE compression garment wear and pump use tolerated well. Pain: 4/10--R LE only. Gait:  With straight cane, mod I, gait pattern normalizing with reduction of pain R LE>  ADLs: indep with don/doff of shoes. Treatment Response:  R LE pain/edema increased, with pt reporting not wearing compression garment R LE yesterday or today. Pt to see PCP following appt. Reviewed packet received at evaluation. Function: see prior visit. TREATMENT AND OBJECTIVE DATA SUMMARY:         Therapeutic Exercise/Procedure                  Home program: Patient to perform daily to BID skin care, deep abdominal breathing, exercise routine ankle pumps/circumduction, toe curls x 10 L LE only. .   Rationale: Exercise will increase the lymph angiomotoricity and tissue pressure of the skin and thus decrease swelling.        Manual Therapy:  12:38-1:40 pm   62 minutes       Area to decongest: Bilateral LE   Sequence used and effectiveness:  Manual therapy Repeated limb volume measurements bilateral LE. Note L LE reduced by 46 mL since most recent visit, R LE elevated by 641.51 mL since most recent visit. Limb volume discrepancy of 3.06% noted, L LE>R LE. Note skin intact L LE.      R LE skin hyperpigmented, however, not severely red in color. Note elevated limb volumes, and visible increase in swelling, potentially related to gout vs lack of compression garment wear. Pt advised that returning to compression bandaging after being cleared by MD if cellulitis ruled out, may be indicated to attain effective fit of custom flat knit compression stocking. Pt advised that addition of night time compression garments may be indicated for optimal management of lymphedema. L LE compression stocking fit remains comfortable and affective. R LE compression deferred, with pt to see physician following this appt. Pt to continue L LE compression garment wear, vasopneumatic pump L LE. Pt advised that, if physician rules out cellulitis R LE, she may resume use of pump, however, defer compression garment wear R LE until next appt, at which time compression bandaging may be resumed if lymphedema progression persists, and reduction of limb required prior to fit of current compression garment. Skin/wound care/debridement:    Skin intact bilateral LE. Note contusion L knee, improving. Pt to continue LE skin care daily. .      To continue home management as follows:  Compression garment routine:  L LE only currently, with R LE to be evaluated by physician this afternoon. 1. Apply daytime compression stocking to clean, dry extremity first thing in the morning, EVERY MORNING. 2. Wear compression garments until bedtime, adjusting throughout the day as needed should garment wrinkle or crease.   Problem areas can be at joints, and top of garment, ensuring proximal aspect of garment positioned appropriately on extremity. 3. Launder garment nightly either by hand or washing machine in a garment bag or pillowcase. Use mild detergent with NO FABRIC SOFTENER, NO BLEACH, NO WOOLITE. Wash in cool/warm water. 4. Dry garment in dryer on low heat right side out in garment bag or pillowcase. 5. Perform skin care to extremity, cleansing skin daily with soap and water, and using low pH lotion, upward strokes to stimulate lymphatic vessels. 6. Daytime compression grments are NOT safe to sleep in, so remove at bedtime. 7. Perform exercise program with compression garments on. Signs/Symptoms of infection include:  Fever, flu-like symptoms, pain/redness/warmthin extremity, sometimes with increase in swelling present. Stop wearing your compression garment if this occurs. Call your doctor immediately or go to the Emergency room to address potential infection. Remove compression with changes in circulation, numbness in extremity different from what you may experience when not wearing compression garment, pain, unexplained shortness of breath. Notify clinic if swelling increase noted prior to next scheduled appt. Night time compression may be needed for optimal management of lymphedema. Return for follow up based on MD appt today. Pt advised second stocking for R LE can be ordered based on tolerance and fit of current stocking, at next scheduled appt. TOTAL TREATMENT 62 mins       ASSESSMENT:   Treatment effectiveness and tolerance: Note effective fit of L LE knee high closed toe Juzo 3022SV compression stockings today, with patient not tolerated wear of R LE stocking since 5/28/2018-today, due to c/o pain/edema. Pt able to don/doff custom flat knit knee high compression stocking L LE at home, arriving with garment donned. Note small skin tear anterior aspect of L LE closed.   Pt advised that she may require addition of night time compression garment bilateral LE for optimal management of lymphedema. Pt to notify clinic of result of MD appt today, with decision regarding follow up treatment to be made based on results of this appt. Resuming compression bandaging R LE may be indicated to promote reduction and allow patient to return to R LE compression garment wear as appropriate based on diagnosis. Pt to continue pump management at home L LE. Pt verbalizes good understanding of s/s of infection, and appropriate medical management. Progress toward goals: Ongoing. Updated as above. PLAN OF CARE:   Changes to the plan of care: L LE home management of custom flat knit daytime compression garment to continue. Continue L LE vasopneumatic pump sequence at home. Pt to notify clinic of result of MD appt today regarding R LE. Pt advised in compression garment precautions above, to monitor L LE more closely secondary to new small skin tear anterior aspect of LE. Return to clinic 1-2 weeks based on results of MD appt today. Resuming compression bandaging R LE may be indicated prior to returning to compression stocking wear.      Frequency: prn           Allyson Man, PT, CLT

## 2018-06-01 ENCOUNTER — HOSPITAL ENCOUNTER (OUTPATIENT)
Dept: PHYSICAL THERAPY | Age: 73
Discharge: HOME | End: 2018-06-01
Payer: MEDICARE

## 2018-06-01 PROCEDURE — G8992 OTHER PT/OT  D/C STATUS: HCPCS

## 2018-06-01 PROCEDURE — G8990 OTHER PT/OT CURRENT STATUS: HCPCS

## 2018-06-01 PROCEDURE — 97140 MANUAL THERAPY 1/> REGIONS: CPT

## 2018-06-01 PROCEDURE — G8991 OTHER PT/OT GOAL STATUS: HCPCS

## 2018-06-01 NOTE — PROGRESS NOTES
_                                    [facility]  [address] [place] [address],[address]  Christianne Brand      LYmphedema Therapy  G-Code 6/1/2018  Visit: 15    [x]        Daily note               [x]       Discharge    NAME: Jamal Frey  Date: 6/1/2018    GOALS: Continue as at time of evaluation  Short term goals  Time frame: 2-8 weeks  1. Instruct the patient to be independent with proper skin care to prevent future skin breakdown and decrease the potential risk for infections that are associated with Lymphedema. 5/22/2018 goal met. 2. Patient will be independent with a personal lymphedema exercise program to assist with the lymphatic flow and reduce limb volume R LE by 400 mL, L LE by 600 mL from limb volume measurements at time of initial treatment. 4/24/2018 initiated compression bandaging R LE, antibiotic completed, s/s of infection managed. 5/1/2018 R LE limb volume reduced by 141. 86 mL since initial evaluation. 5/22/2018 R LE reduced by 204.36 mL today, ongoing. 6/1/2018 discontinue goal, reassess at 6 month evaluation. 3. Patient will understand the signs and symptoms of acute infection. 6/1/2018 goal met. Long term goals  Time frame: 8-12 weeks     1. Patient will have knowledge of the compression options and acquire a safe and                                 appropriate daytime and night time compression system to prevent                                                 re-accumulation of fluid. 4/16/2018 completed measurements for L LE custom flat knit knee high compression stocking, Juzo [id number]. 4/27/2018 Fit custom flat knit knee high compression stockings, with good initial fit. However, patient unable to don/doff in clinic. 5/8/2018 completed measurements for custom flat knit R LE knee high. 5/22/2018 fit R LE knee high compression stocking, with good fit of bilateral knee high compression stockings established. 5/29/2018 patient unable to tolerate wear of R LE knee high custom flat knit compression stocking for 2 days, stating she believes cause is related to gout. MD appt following her visit here. 6/1/2018 goal met for daytime compression garments. 2.  Patient or family will be able to don/doff garments independently. Garment                                 system effectiveness will be evaluated prior to discharge for better long-term                                 management and outcomes. 5/22/2018 goal met. 3.  Improvement in LLIS outcomes measure by 10-14 points upon transition to compression garment wear. 5/22/2018 goal met. 4.   To transition patient to independent restorative phase of CDT. 6/1/2018 goal met. SUBJECTIVE REPORT:  Patient arrives with custom flat knit compression stocking donned bilateral LE, stating that MD diagnosed R LE gout, initiating oral steroid, with plan to consider additional medication to address gout due to condition becoming chronic. Pt reports tolerating garment wear since last seen. Does report pump pressure being too aggressive, questioning if she can reduce pressure. Pain: 4/10--R LE only. Gait:  With straight cane, mod I, gait pattern normalizing with reduction of pain R LE>  ADLs: indep with don/doff of shoes. Treatment Response:  R LE pain/edema improved, with patient able to return to wear of custom flat knit compression stockings bilateral LE. Pt to adjust pump to 30 mm/Hg, and to notify clinic of pump tolerance in 1 week. Tolerated R LE MLD well. Function: In compliance with CMSs Claims Based Outcome Reporting, the following G-code set was chosen for this patient based on their primary functional limitation being treated: The outcome measure chosen to determine the severity of the functional limitation was the LLIS with a score of 9/72 which was correlated with the impairment scale. ?  Other PT/OT Primary Functional Limitations:  - CURRENT STATUS: CI - 1%-19% impaired, limited or restricted    - GOAL STATUS: CI - 1%-19% impaired, limited or restricted    - D/C STATUS:  CI - 1%-19% impaired, limited or restricted             TREATMENT AND OBJECTIVE DATA SUMMARY:         Therapeutic Exercise/Procedure                  Home program: Patient to perform daily to BID skin care, deep abdominal breathing, exercise routine ankle pumps/circumduction, toe curls x 10 L LE only. .   Rationale: Exercise will increase the lymph angiomotoricity and tissue pressure of the skin and thus decrease swelling. Manual Therapy:  12:05-12:52 pm   47 minutes       Area to decongest: Bilateral LE   Sequence used and effectiveness:  Manual therapy Repeated limb volume measurements R LE, with volume stable--see scanned graph. R LE MLD sequence performed, with patient tolerating well. Skin/wound care/debridement:    Skin intact bilateral LE. Note contusion L knee, improving. Pt to continue LE skin care daily. R LE hyperpigmentation improved since most recent visit, with oral steroid initiated by MD to address gout. .      To continue home management as follows:  Compression garment routine:  1. Apply daytime compression stocking to clean, dry extremity first thing in the morning, EVERY MORNING. 2. Wear compression garments until bedtime, adjusting throughout the day as needed should garment wrinkle or crease. Problem areas can be at joints, and top of garment, ensuring proximal aspect of garment positioned appropriately on extremity. 3. Launder garment nightly either by hand or washing machine in a garment bag or pillowcase. Use mild detergent with NO FABRIC SOFTENER, NO BLEACH, NO WOOLITE. Wash in cool/warm water. 4. Dry garment in dryer on low heat right side out in garment bag or pillowcase.   5. Perform skin care to extremity, cleansing skin daily with soap and water, and using low pH lotion, upward strokes to stimulate lymphatic vessels. 6. Daytime compression grments are NOT safe to sleep in, so remove at bedtime. 7. Perform exercise program with compression garments on. Signs/Symptoms of infection include:  Fever, flu-like symptoms, pain/redness/warmthin extremity, sometimes with increase in swelling present. Stop wearing your compression garment if this occurs. Call your doctor immediately or go to the Emergency room to address potential infection. Remove compression with changes in circulation, numbness in extremity different from what you may experience when not wearing compression garment, pain, unexplained shortness of breath. Notify clinic if swelling increase noted prior to next scheduled appt. Night time compression may be needed for optimal management of lymphedema. Return for follow up based on MD appt today. Pt requests second R LE custom flat knit knee high. TOTAL TREATMENT 47 mins       ASSESSMENT:   Treatment effectiveness and tolerance: Note effective fit of bilateral LE knee high closed toe Juzo [id number] compression stockings today. Pt able to don/doff custom flat knit knee high compression stocking L LE at home, arriving with garment donned. Note small skin tear anterior aspect of L LE closed. Pt advised that she may require addition of night time compression garment bilateral LE for optimal management of lymphedema, however, will continue with wear of custom flat knit knee high compression stockings, and daily use of vasopneumatic pump until 6 month follow up. Progress toward goals: Updated as above. PLAN OF CARE:   Changes to the plan of care: Bilateral LE home management of custom flat knit daytime compression garment to continue. Continue bilateral LE vasopneumatic pump sequence at home. Pt to adjust pump pressure from 40 to 30 mm/Hg, and to notify clinic of response to adjusted pressure next week.   Pt to call clinic with any questions or concerns prior to next scheduled appt in 6 months. Discharge at this time.    Frequency: nicole Ruiz, PT, CLT

## 2018-12-21 ENCOUNTER — HOSPITAL ENCOUNTER (OUTPATIENT)
Dept: PHYSICAL THERAPY | Age: 73
Discharge: HOME | End: 2018-12-21
Payer: MEDICARE

## 2018-12-21 VITALS
DIASTOLIC BLOOD PRESSURE: 88 MMHG | OXYGEN SATURATION: 94 % | SYSTOLIC BLOOD PRESSURE: 136 MMHG | BODY MASS INDEX: 38.77 KG/M2 | HEIGHT: 67 IN | HEART RATE: 76 BPM | WEIGHT: 247 LBS

## 2018-12-21 PROCEDURE — G8992 OTHER PT/OT  D/C STATUS: HCPCS

## 2018-12-21 PROCEDURE — 97530 THERAPEUTIC ACTIVITIES: CPT

## 2018-12-21 PROCEDURE — 97161 PT EVAL LOW COMPLEX 20 MIN: CPT

## 2018-12-21 PROCEDURE — G8991 OTHER PT/OT GOAL STATUS: HCPCS

## 2018-12-21 PROCEDURE — G8990 OTHER PT/OT CURRENT STATUS: HCPCS

## 2018-12-21 NOTE — PROGRESS NOTES
[address] [place] [address]  Holden Brandtushar       INITIAL EVALUATION    NAME: Jean-Claude Huerta AGE: 74 y.o. GENDER: female  DATE: 12/21/2018  REFERRING PHYSICIAN: Maria Esther Haney MD  HISTORY AND BACKGROUND:  Patient is a 74y/o female with history LE lymphedema for greater than 10 years, returning here for evaluation of lymphedema to ensure home management program remaining effective. Pt reports fall at home about 2 months ago. States she became dizzy, then fell. Reports she has been seen by her PCP and cardiologist since fall, with potential dehydration considered, patient reporting increase of water intake, with no repeat episodes report. Lymphedema history as follows:    Patient received treatment at [facility] prior to TKR in 2008, having undergone phase I CDT. Patient has continued with wear of custom flat knit knee high compression stockings, having them replaced via fitter at [facility], with most recent pair purchased 1-2 years ago. Note garments covering only half of lower legs, with fit likely impaired by recent progression of lymphedema. Oral diuretic has been increased to address increase in limb size, with patient reporting taking 60 mg Furosemide 2x/day. States legs have reduced in size, however, physicians nor patient want high dose of diuretic to continue. Patient was seen at this clinic 2/28/2018-6/1/2018, for phase I CDT, fit with custom flat knit compression stockings and transitioned to home management of lymphedema.    Primary Diagnosis:  · Primary lymphedema (Q82.0)  Other Treatment Diagnoses:   Abnormality of gait (other abnormalities of gait and mobility R26.89)   Swelling not relieved by elevation ( R60.9 edema)   Venous insufficiency I87.2;    Cellulitis (R leg L03.116)   L LE DVT post TKE 2008 (L03.114)   PE (I26.9) 2008  Morbid Obesity (E66.01)  Date of Onset: prior to 2008  Present Symptoms and Functional Limitations: Bilateral LE lymphedema managed with daily wear of custom flat knit compression stockings. Patient reports requiring assistance to doff compression stockings. Reports increase skin tightness/limb heaviness with lymphedema, wearing knee high stockings only at this time, per patient preference. Gait deficits noted, patient using cane for gait, transfers/bed mobility require additional time. One fall reported over the past 6 months as noted above. Lymphedema Life Impact Scale: 26/72; 38%, CJ  Past Medical History:   Past Medical History:   Diagnosis Date    Anemia, chronic disease 7/31/2012    Arrhythmia 2006, 2008    [address] CATH    Arthritis     OSTEO    CAD (coronary artery disease)     BLOCKAGES BILATERAL CAROTID, SAW MD 1 MONTH AGO    Chronic pain     LOWER BACK    Depression     Diabetes ([address]     TYPE 2, INSULIN    Hypercholesterolemia     Hypertension     Hypothyroidism 12/29/2011    Macular degeneration 9/18/2015    Osteoporosis     Pulmonary embolism ([address]     Pulmonary hypertension, mild ([address] 2006    Restrictive airway disease     Thromboembolus ([address] 2009    LEFT KNEE AFTER REPLACEMENT    Thyroid disease     HYPOTHYROID    Unspecified sleep apnea     USES CPAP     Past Surgical History:   Procedure Laterality Date    ENDOSCOPY, COLON, DIAGNOSTIC      2003;neg    HX HEART CATHETERIZATION  2006, 2008    2 TIMES    HX TONSILLECTOMY  1951    MO ANESTH,SURGERY OF SHOULDER      TOTAL KNEE ARTHROPLASTY  2009    LEFT     Current Medications:    Current Outpatient Medications   Medication Sig    potassium chloride SR (KLOR-CON 10) 10 mEq tablet TAKE 1 TABLET BY MOUTH  TWICE A DAY    atorvastatin (LIPITOR) 10 mg tablet TAKE 1 TABLET BY MOUTH  DAILY    amitriptyline (ELAVIL) 10 mg tablet Take 10 mg by mouth nightly.     colchicine 0.6 mg tablet Take 1 Tab by mouth daily as needed. For gout.  furosemide (LASIX) 40 mg tablet Take 1.5 Tabs by mouth two (2) times a day. (Patient taking differently: Take 40 mg by mouth two (2) times a day.)    cloNIDine HCl (CATAPRES) 0.2 mg tablet Take 1 tablet by mouth 3  times daily    levothyroxine (SYNTHROID) 100 mcg tablet Take 100 mcg by mouth Daily (before breakfast).  Cholecalciferol, Vitamin D3, (VITAMIN D3) 1,000 unit cap Take 1,000 Units by mouth daily.  ferrous sulfate (SLOW FE) 142 mg (45 mg iron) ER tablet Take  by mouth Daily (before breakfast).  VIT C/AMANDA AC/LUT/COPPER/ZNOX (PRESERVISION LUTEIN PO) Take  by mouth two (2) times a day.  hydrALAZINE (APRESOLINE) 50 mg tablet Take 50 mg by mouth three (3) times daily.  metolazone (ZAROXOLYN) 2.5 mg tablet Take 2.5 mg by mouth. Twice a week.  ranolazine ER (RANEXA) 500 mg SR tablet Take 1,000 mg by mouth two (2) times a day.  febuxostat (ULORIC) 40 mg tab tablet Take 40 mg by mouth daily.  diltiazem hcl 360 mg Tb24 Take 360 mg by mouth daily.  insulin regular (NOVOLIN R, HUMULIN R) 100 unit/mL injection by SubCUTAneous route. 18 units am, 18 units lunch, 20 units pm (adjusts prn).  insulin NPH (NOVOLIN N, HUMULIN N) 100 unit/mL injection by SubCUTAneous route once. 20 units BID    aspirin (ASPIRIN) 325 mg tablet Take 325 mg by mouth daily.  VIT B12/INTRINS FACT/FA CMB #2 (INTRINSI B12-FOLATE PO) Take  by mouth daily. No current facility-administered medications for this encounter. Allergies: Allergies   Allergen Reactions    Levaquin [Levofloxacin] Other (comments)     Prolonged QT interval.      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 Patient has moved in with adult daughter.   Pt reports long-term history of LE lymphedema, well managed with wear of custom flat knit Juzo 3022SV daytime knee high stockings. Living Situation: Lives with adult daughter. Trainable Caregiver?: patient states her daughter assists her as needed. Self-care/ADLs: mod I showers, with shower seat/hand held shower. Dresses mod I, additional time. Light meal prep. Pt reports donning knee high garments mod I with donning gloves, with assist of daughter to doff stockings. Mobility: transfers with straight cane, mod I, and additional time. Gait short community distances with straight cane, able to walk from front door of clinic building to clinic 100+ feet, additional time, using elevator to get to second floor. Patient does not drive, over 5 years. Sleeping Arrangement:  bed   Adaptive Equipment Owned: cane, shower seat, hand held shower, CPAP. Previous Therapy:  Seen at [address] [place] [address] for phase I CDT over 10 years ago, so she is familiar with outpatient treatment of lymphedema. States she has been having custom flat knit daytime compression stockings replaced at [facility] intermittently, last replaced a year and a half ago, with proximal aspect of garment mid calf. Seen at this clinic from 2/28/2018-6/1/2018 phase I CDT, cellulitis episode interrupting treatment. Pt fit with custom flat knit stockings, and transitioned to home management phase of lymphedema. Compression/Lymphedema Equipment:  Knee high Juzo 3022SV custom flat knit stockings for daytime wear, two pair, initial fit 4/2018, second pair ordered after recheck of garments. Good fit noted today. SUBJECTIVE:   Patient reports she is doing well with home management of lymphedema with daily wear of custom flat knit stockings. States she did fall a couple of weeks ago, with dizziness resulting in fall. As noted above, she followed up with her PCP and cardiologist after fall. Pt states she is living with her daughter now. No episodes of cellulitis LE's since last seen.   Patients goals for therapy: evaluate lymphedema, and order replacement pair of compression stockings. OBJECTIVE DATA SUMMARY:   EXAMINATION/PRESENTATION/DECISION MAKING:   Pain:  Pain Scale 1: Numeric (0 - 10)  Pain Intensity 1: 4  Pain Location 1: Leg  Skin and Tissue Assessment:  Dermal Status:  [x]   Intact [x]  Dry   []  Tenuous [] Flaky   []  Wound/lesion present []  Scars   []  Dermatitis    Texture/Consistency:  [x]  Boggy: foot/ankle []  Pitting Edema:    []  Brawny []  Combination   [x]  Fibrotic/Woody: distal lower leg, improved since last seen. Pigmentation/Color Change:  []  Normal []  Hemosiderin   []  Red []  Erythematous   [x]  Hyperpigmented []  Hyperlipodermatosclerosis   Anomalies: na  []  Lymphorrhea []  Vesicles   []  Petechiae []  Warty Vercusis   []  Bullae []  Papilloma   Circulatory:  []  BELLE []  Varicosities:   [x]  Pulses: palpable dorsal pedal pulse bilateral []  Vascular studies ruled out DVT in past   [x]  DVT History: post TKR 2008, including PE. No DVT history since    Nails:  []   Normal  [x]   Fungus  Stemmers Sign: R>L, positive    Height:  Height: 5' 7\" (170.2 cm)  Weight:  Weight: 112 kg (247 lb)   BMI:  BMI (calculated): 38.7  (36 or greater: adversely affecting lymphedema)    Volumetric Measurements:   Right:  11,654. 11 mL today; 34,436. 16 mL 2/28/2018 Left: 11,749.64 mL today;  12,324.83 mL 2/28/2018   % Difference: 0.82%    (See scanned graph)  Range of Motion: bilateral hip flex 90; R knee flex 80; L knee flex 90; ankle DF -10 bilateral.    Strength: bilateral hip m 4/5; quad/ham 4/5; anterior tibialis m 4/5. Sensation:  Decreased light touch, bilateral toes. Mobility:  Bed/Chair Mobility:  Modified independent and Additional time  Transfers:  Modified independent and Additional time    Sitting Balance:  good Standing Balance:  Fair+   Gait:  Modified independent and Additional time, wide base of support with straight cane, decreased stride length bilateral, forward flexed posture, moderately slow og.  Wheelchair Mobility:  na   Endurance: Moderately compromised, gait short community distances with straight cane. Stairs:  Not assessed         Safety:  Patient is alert and oriented:  x4   Safety awareness:  intact   Fall Risk?:  Moderate, ambulates with straight cane. See gait assessment   Patient given written fall prevention handout: Yes   Precautions:  Standard lymphedema precautions to include avoiding blood pressure readings, injections and IVs or other procedures/acts that could lead to broken skin on affected area, and avoiding excessive heat, resistive activity or altitude without compression garment    Functional Measure: In compliance with CMSs Claims Based Outcome Reporting, the following G-code set was chosen for this patient based on their primary functional limitation being treated: The outcome measure chosen to determine the severity of the functional limitation was the LLIS with a score of 26/72 which was correlated with the impairment scale. ? Other PT/OT Primary Functional Limitations:     - CURRENT STATUS: CJ - 20%-39% impaired, limited or restricted    - GOAL STATUS: CJ - 20%-39% impaired, limited or restricted    - D/C STATUS:  CJ - 20%-39% impaired, limited or restricted     Physical Therapy Evaluation Charge Determination   History Examination Presentation Decision-Making   MEDIUM  Complexity : 1-2 comorbidities / personal factors will impact the outcome/ POC  MEDIUM Complexity : 3 Standardized tests and measures addressing body structure, function, activity limitation and / or participation in recreation  LOW Complexity : Stable, uncomplicated  Other outcome measures LLIS  LOW       Based on the above components, the patient evaluation is determined to be of the following complexity level: LOW     Evaluation Time: 12:40-1:00 pm    TREATMENT PROVIDED:   1.   Treatment description:  The patient was educated regarding the role and function of the lymphatic system, pathophysiology of primary lymphedema, and was instructed in the lymphedema management protocol of complete decongestive therapy (CDT). This includes skin care to prevent breakdown or infection, lymphedema exercises, custom compression therapy options (bandaging, compression garments, compression pump, Bea Valencia, [employer], [employer], etc. as needed), and decongestion with manual lymphatic drainage as indicated. We discussed the need for consistent compression system for lymphedema management. Pt to continue daily skin care, cleansing with soap and water and monitoring any breaks in skin for s/s of infection. Measurements were completed for replacement daytime compression stockings, with patient advised to fit garments at home and shakira new pair, discarding initially purchased pair in 2 months, continuing wear of newest pair, and pair ordered today if good fit of replacement compression garments noted. Pt aware of need to replacement garments in a timely manner for effective home management of lymphedema. To continue daily use of vasopneumatic pump. Pt advised to return in 6 months for evaluation of lymphedema, or previously as needed. Home management instructions as follows regarding compression garment wear:  Compression garment routine:  1. Apply daytime compression stocking to clean, dry extremity first thing in the morning, EVERY MORNING. 2. Wear compression garments until bedtime, adjusting throughout the day as needed should garment wrinkle or crease. Problem areas can be at joints, and top of garment, ensuring proximal aspect of garment positioned appropriately on extremity. 3. Launder garment nightly either by hand or washing machine in a garment bag or pillowcase. Use mild detergent with NO FABRIC SOFTENER, NO BLEACH, NO WOOLITE. Wash in cool/warm water. 4. Dry garment in dryer on low heat right side out in garment bag or pillowcase.   5. Perform skin care to extremity, cleansing skin daily with soap and water, and using low pH lotion, upward strokes to stimulate lymphatic vessels. 6. Daytime compression grments are NOT safe to sleep in, so remove at bedtime. 7. Perform exercise program with compression garments on. Conditions under which to discontinue home management of lymphedema and seek medical attention:   Signs/Symptoms of infection include:  Fever, flu-like symptoms, pain/redness/warmthin extremity, sometimes with increase in swelling present. Stop wearing your compression garment if this occurs. Call your doctor immediately or go to the Emergency room to address potential infection. Remove compression with changes in circulation, numbness in extremity different from what you may experience when not wearing compression garment, pain, unexplained shortness of breath. Notify clinic if swelling increase noted prior to next scheduled appt. Night time compression may be needed for optimal management of lymphedema. Return in 2 weeks for follow up appt. Daily use of vasopneumatic pump as tolerated. Discontinue pump use/compression garment wear based on precautions noted above. Treatment time:  1:01-1:41 pm  Minutes: 40     Therapeutic activity 3    ASSESSMENT:   Jomar Thomas is a 74 y.o. female who presents with stage II lymphedema, bilateral LE, well managed with daily wear of custom flat knit compression stockings, Juzo 3022SV knee highs. Note improvement in limb volumes as noted above, see scanned graph. Pt reports no cellulitis episodes since last seen. Pt reports continues taking Lasix per MD order, 60 mg dose 2x/day. Pt reports foot/ankle swelling has improved with increase in oral diuretic dosage, however, expresses concern regarding kidney function. Completed measurements for new pair of custom flat knit compression stockings, knee high, as noted above. Pt presents in appearance as lymphedema being primary in nature.   Patient will benefit from continued wear of compression stockings, CCL 2, custom garments required secondary to foot involvement. Pt     This care is medically necessary due to the infection risk with lymphedema, and to improve functional activities. CDT is necessary to resolve swelling to allow patient to return to wearing normal clothes/footwear, and prevent worsening of symptoms, such as venous stasis ulcerations, infections, or hospitalizations. Patient will be independent with home program strategies to allow improved ADL ability and mobility and to allow patient to return to greatest functional independence. Rehabilitation potential is considered to be Good. Factors which may influence rehabilitation potential include effective home management of lymphedema bilateral LE demonstrated today. No further PT visits at this time. Pt to return in 6 month for evaluation, with new MD order required. PLAN OF CARE:   Recommendations and Planned Interventions:  Pt to fit compression garments at home upon receiving and notify clinic of any concerns regarding fit of garments to allow alterations to be requested. Patient has participated in goal setting and agrees to work toward plan of care. Patient was instructed to call if questions or concerns arise. Thank you for this referral.  Kavita Obregon, PT, CLT   Time Calculation: 61 mins    TREATMENT PLAN EFFECTIVE DATES:   12/21/2018   I have read the above plan of care for [facility]. I certify the above prescribed services are required by this patient and are medically necessary.   The above plan of care has been developed in conjunction with the lymphedema/physical therapist.       Physician Signature: ____________________________________Date:______________

## 2019-06-25 ENCOUNTER — HOSPITAL ENCOUNTER (OUTPATIENT)
Dept: PHYSICAL THERAPY | Age: 74
Discharge: HOME | End: 2019-06-25
Payer: MEDICARE

## 2019-06-25 PROCEDURE — 97161 PT EVAL LOW COMPLEX 20 MIN: CPT

## 2019-06-25 PROCEDURE — 97530 THERAPEUTIC ACTIVITIES: CPT

## 2019-06-25 NOTE — PROGRESS NOTES
[facility] [address] [facility]  [address] [facility]  [address] [place] Velas[place]       EVALUATION    NAME: Allyson Valenzuela AGE: 66 y.o. GENDER: female  DATE: 6/25/2019  REFERRING PHYSICIAN: Julia Falk MD  HISTORY AND BACKGROUND:  Patient is a 65 y/o female with history LE lymphedema for greater than 10 years, returning here for evaluation of lymphedema to ensure home management program remaining effective and to be measured for replacement custom flat knit knee high CCL 2 stockings. Pt reports fall at home about yesterday, uncertain of cause. Now using rollator walker for all ambulation. Reports no cellulitis infections bilateral LE since last seen. Does report low back pain with recent injections to address pain. Lymphedema history as follows:    Patient received treatment at [facility] prior to TKR in 2008, having undergone phase I CDT. Patient has continued with wear of custom flat knit knee high compression stockings, having them replaced via fitter at [facility], with most recent pair purchased 1-2 years ago. Note garments covering only half of lower legs, with fit likely impaired by recent progression of lymphedema. Oral diuretic has been increased to address increase in limb size, with patient reporting taking 60 mg Furosemide 2x/day. States legs have reduced in size, however, physicians nor patient want high dose of diuretic to continue. Patient was seen at this clinic 2/28/2018-6/1/2018, for phase I CDT, fit with custom flat knit compression stockings and transitioned to home management of lymphedema.    Primary Diagnosis:  · Primary lymphedema (Q82.0)  Other Treatment Diagnoses:   Abnormality of gait (other abnormalities of gait and mobility R26.89)   Swelling not relieved by elevation ( R60.9 edema)   Venous insufficiency I87.2;    L LE DVT post TKE 2008 (L03.114)   PE (I26.9) 2008  Morbid Obesity (E66.01)  Date of Onset: prior to 2008  Present Symptoms and Functional Limitations: Bilateral LE lymphedema managed with daily wear of custom flat knit compression stockings. Patient reports requiring assistance to doff compression stockings. Reports increase skin tightness/limb heaviness with lymphedema, reduced strength bilateral LE, wearing knee high stockings only at this time, per patient preference. Gait deficits noted, patient using rollator walker for gait, transfers/bed mobility require additional time. One fall reported over the past 6 months as noted above. Lymphedema Life Impact Scale: 8/68  Past Medical History:   Past Medical History:   Diagnosis Date    Anemia, chronic disease 7/31/2012    Arrhythmia 2006, 2008    [employer] CATH    Arthritis     OSTEO    CAD (coronary artery disease)     BLOCKAGES BILATERAL CAROTID, SAW MD 1 MONTH AGO    Chronic pain     LOWER BACK    Depression     Diabetes ([address]     TYPE 2, INSULIN    Hypercholesterolemia     Hypertension     Hypothyroidism 12/29/2011    Macular degeneration 9/18/2015    Osteoporosis     Pulmonary embolism ([address]     Pulmonary hypertension, mild ([address] 2006    Restrictive airway disease     Thromboembolus ([address] 2009    LEFT KNEE AFTER REPLACEMENT    Thyroid disease     HYPOTHYROID    Unspecified sleep apnea     USES CPAP     Past Surgical History:   Procedure Laterality Date    ENDOSCOPY, COLON, DIAGNOSTIC      2003;neg    HX HEART CATHETERIZATION  2006, 2008    2 TIMES    HX TONSILLECTOMY  1951    UT ANESTH,SURGERY OF SHOULDER      TOTAL KNEE ARTHROPLASTY  2009    LEFT     Current Medications:    Current Outpatient Medications   Medication Sig    furosemide (LASIX) 40 mg tablet Take 1.5 Tabs by mouth two (2) times a day.     cloNIDine HCl (CATAPRES) 0.2 mg tablet TAKE 1 TABLET BY MOUTH 3  TIMES DAILY    potassium chloride SR (KLOR-CON 10) 10 mEq tablet TAKE 1 TABLET BY MOUTH  TWICE A DAY    atorvastatin (LIPITOR) 10 mg tablet TAKE 1 TABLET BY MOUTH  DAILY    amitriptyline (ELAVIL) 10 mg tablet Take 10 mg by mouth nightly.  colchicine 0.6 mg tablet Take 1 Tab by mouth daily as needed. For gout.  levothyroxine (SYNTHROID) 100 mcg tablet Take 100 mcg by mouth Daily (before breakfast).  Cholecalciferol, Vitamin D3, (VITAMIN D3) 1,000 unit cap Take 1,000 Units by mouth daily.  ferrous sulfate (SLOW FE) 142 mg (45 mg iron) ER tablet Take  by mouth Daily (before breakfast).  VIT C/AMANDA AC/LUT/COPPER/ZNOX (PRESERVISION LUTEIN PO) Take  by mouth two (2) times a day.  hydrALAZINE (APRESOLINE) 50 mg tablet Take 50 mg by mouth three (3) times daily.  metolazone (ZAROXOLYN) 2.5 mg tablet Take 2.5 mg by mouth. Twice a week.  ranolazine ER (RANEXA) 500 mg SR tablet Take 1,000 mg by mouth two (2) times a day.  febuxostat (ULORIC) 40 mg tab tablet Take 40 mg by mouth daily.  diltiazem hcl 360 mg Tb24 Take 360 mg by mouth daily.  insulin regular (NOVOLIN R, HUMULIN R) 100 unit/mL injection by SubCUTAneous route. 18 units am, 18 units lunch, 20 units pm (adjusts prn).  insulin NPH (NOVOLIN N, HUMULIN N) 100 unit/mL injection by SubCUTAneous route once. 20 units BID    aspirin (ASPIRIN) 325 mg tablet Take 325 mg by mouth daily.  VIT B12/INTRINS FACT/FA CMB #2 (INTRINSI B12-FOLATE PO) Take  by mouth daily. No current facility-administered medications for this encounter. Allergies: Allergies   Allergen Reactions    Levaquin [Levofloxacin] Other (comments)     Prolonged QT interval.      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 Patient has moved in with adult daughter. Pt reports long-term history of LE lymphedema, well managed with wear of custom flat knit Juzo 3022SV daytime knee high stockings.    Living Situation: Lives with adult daughter. Trainable Caregiver?: patient states her daughter assists her as needed. Self-care/ADLs: mod I showers, with shower seat/hand held shower. Dresses mod I, additional time. Light meal prep. Pt reports donning knee high garments mod I with donning gloves, with assist of daughter to doff stockings. Mobility: transfers with rollatore walker, mod I, and additional time. Gait short community distances with rollator walker, able to walk from front door of clinic building to clinic 100+ feet, additional time, using elevator to get to second floor. Patient does not drive, over 5 years. Daughter obtained chair lift for patient to use up and down stairs with bedroom on second floor. Sleeping Arrangement:  bed   Adaptive Equipment Owned: cane, rollator walker, shower seat, hand held shower, CPAP. Previous Therapy:  Seen at [address] [place] [address] for phase I CDT over 10 years ago, so she is familiar with outpatient treatment of lymphedema. States she has been having custom flat knit daytime compression stockings replaced at [facility] intermittently, last replaced a year and a half ago, with proximal aspect of garment mid calf. Seen at this clinic from 2/28/2018-6/1/2018 phase I CDT, cellulitis episode interrupting treatment. Returned for evaluation 12/2018 with condition stable. Pt fit with custom flat knit stockings 5/2018, and transitioned to home management phase of lymphedema. Compression/Lymphedema Equipment:  Knee high Juzo 3022SV custom flat knit stockings for daytime wear, two pair, initial fit 4/2018, second pair ordered after recheck of garments. Garments in need of replacement due to age, wear, poor fit, with patient also reporting weight loss. Vasopneumatic pump. SUBJECTIVE:   Patient reports she is doing well with home management of lymphedema with daily wear of custom flat knit stockings. States she did fall yesterday, with no known cause or injury.  with dizziness resulting in fall. States she is continuing with daily wear of compression stockings, stating she needs new stockings. Also reports using vasopneumatic pump with good response. Patients goals for therapy: evaluate lymphedema, and order replacement pair of compression stockings. OBJECTIVE DATA SUMMARY:   EXAMINATION/PRESENTATION/DECISION MAKING:   Pain: 3/10; heaviness bilateral LE, skin tightness. Skin and Tissue Assessment:  Dermal Status:  [x]   Intact [x]  Dry   []  Tenuous [x] Flaky   []  Wound/lesion present []  Scars   []  Dermatitis    Texture/Consistency:  [x]  Boggy: foot/ankle []  Pitting Edema:    []  Brawny []  Combination   [x]  Fibrotic/Woody: distal lower leg, improved since last seen. Pigmentation/Color Change:  []  Normal []  Hemosiderin   []  Red []  Erythematous   [x]  Hyperpigmented []  Hyperlipodermatosclerosis   Anomalies: na  []  Lymphorrhea []  Vesicles   []  Petechiae []  Warty Vercusis   []  Bullae []  Papilloma   Circulatory:  []  BELLE []  Varicosities:   [x]  Pulses: palpable dorsal pedal pulse bilateral []  Vascular studies ruled out DVT in past   [x]  DVT History: post TKR 2008, including PE. No DVT history since    Nails:  []   Normal  [x]   Fungus  Stemmers Sign: R>L, positive    Height:     Weight:      BMI:     (36 or greater: adversely affecting lymphedema)    Volumetric Measurements:   Right:  11,432. 62 mL today; 11,654. 11 mL 12/2018; 74,666. 16 mL 2/28/2018 Left: 11,907.25 mL today. 52,575.47 mL 12/2018;  12,324.83 mL 2/28/2018   % Difference: 4.15%, L>R LE    (See scanned graph)  Range of Motion: bilateral hip flex 90; R knee flex 80; L knee flex 90; ankle DF -10 bilateral.    Strength: bilateral hip m 4-/5; quad/ham 4/5; anterior tibialis m 4/5. Sensation:  Decreased light touch, bilateral toes.   Mobility:  Bed/Chair Mobility:  Modified independent and Additional time  Transfers:  Modified independent and Additional time    Sitting Balance:  good Standing Balance:  Fair+   Gait:  Modified independent and Additional time, wide base of support with rollator walker, decreased stride length bilateral, forward flexed posture, moderately slow og. Wheelchair Mobility:  na   Endurance: Moderately compromised, gait short community distances with straight cane. Stairs:  Not assessed         Safety:  Patient is alert and oriented:  x4   Safety awareness:  intact   Fall Risk?:  Moderate, ambulates with straight cane. See gait assessment   Patient given written fall prevention handout: Yes   Precautions:  Standard lymphedema precautions to include avoiding blood pressure readings, injections and IVs or other procedures/acts that could lead to broken skin on affected area, and avoiding excessive heat, resistive activity or altitude without compression garment    In compliance with CMSs Claims Based Outcome Reporting, the following G-code set was chosen for this patient based on their primary functional limitation being treated:      ? Other PT/OT Primary Functional Limitations:     - CURRENT STATUS: CI - 1%-19% impaired, limited or restricted    - GOAL STATUS: CI - 1%-19% impaired, limited or restricted    - D/C STATUS:  ---------------To be determined---------------     Physical Therapy Evaluation Charge Determination   History Examination Presentation Decision-Making   MEDIUM  Complexity : 1-2 comorbidities / personal factors will impact the outcome/ POC  MEDIUM Complexity : 3 Standardized tests and measures addressing body structure, function, activity limitation and / or participation in recreation  LOW Complexity : Stable, uncomplicated  Other outcome measures LLIS  LOW       Based on the above components, the patient evaluation is determined to be of the following complexity level: LOW         Evaluation Time: 12:30-12:48 pm    18 minutes    TREATMENT PROVIDED:   1.   Treatment description:  Pt to continue daily skin care, cleansing with soap and water and monitoring any breaks in skin for s/s of infection. Measurements were completed for replacement daytime compression stockings, adding V-Y heel to reduce tightness anterior ankle. Pt aware of need to replacement garments in a timely manner for effective home management of lymphedema. To continue daily use of vasopneumatic pump. Pt advised to return for fit of replacement compression garments upon receiving. Home management instructions as follows regarding compression garment wear:  Compression garment routine:  1. Apply daytime compression stocking to clean, dry extremity first thing in the morning, EVERY MORNING. 2. Wear compression garments until bedtime, adjusting throughout the day as needed should garment wrinkle or crease. Problem areas can be at joints, and top of garment, ensuring proximal aspect of garment positioned appropriately on extremity. 3. Launder garment nightly either by hand or washing machine in a garment bag or pillowcase. Use mild detergent with NO FABRIC SOFTENER, NO BLEACH, NO WOOLITE. Wash in cool/warm water. 4. Dry garment in dryer on low heat right side out in garment bag or pillowcase. 5. Perform skin care to extremity, cleansing skin daily with soap and water, and using low pH lotion, upward strokes to stimulate lymphatic vessels. 6. Daytime compression grments are NOT safe to sleep in, so remove at bedtime. 7. Perform exercise program with compression garments on. Conditions under which to discontinue home management of lymphedema and seek medical attention:   Signs/Symptoms of infection include:  Fever, flu-like symptoms, pain/redness/warmthin extremity, sometimes with increase in swelling present. Stop wearing your compression garment if this occurs. Call your doctor immediately or go to the Emergency room to address potential infection.   Remove compression with changes in circulation, numbness in extremity different from what you may experience when not wearing compression garment, pain, unexplained shortness of breath. Notify clinic if swelling increase noted prior to next scheduled appt. Night time compression may be needed for optimal management of lymphedema. Return in 2 weeks for follow up appt. Daily use of vasopneumatic pump as tolerated. Discontinue pump use/compression garment wear based on precautions noted above. Treatment time: 12:49-1:19  pm  Minutes: 30    Therapeutic activity 2    ASSESSMENT:   Jackie Ramsey is a 66 y.o. female who presents with stage II lymphedema, bilateral LE, well managed with daily wear of custom flat knit compression stockings, Juzo 3022SV knee highs. Patient advised to focus on skin care with LE skin noted to be dry/flaky. Pt verbalizes good understanding of instruction. Note improvement in limb volume R LE, elevated by 157.61 mL since 12/2018. See scanned graph. Pt reports no cellulitis episodes since last seen. Pt reports continues taking Lasix per MD order, 60 mg dose 2x/day. Completed measurements for new pair of custom flat knit compression stockings, knee high, as noted above. Pt presents in appearance as lymphedema being primary in nature. Patient will benefit from continued wear of compression stockings, CCL 2, custom garments required secondary to foot involvement. Pt     This care is medically necessary due to the infection risk with lymphedema, and to improve functional activities. CDT is necessary to resolve swelling to allow patient to return to wearing normal clothes/footwear, and prevent worsening of symptoms, such as venous stasis ulcerations, infections, or hospitalizations. Patient will be independent with home program strategies to allow improved ADL ability and mobility and to allow patient to return to greatest functional independence. Rehabilitation potential is considered to be Good.   Factors which may influence rehabilitation potential include effective home management of lymphedema bilateral LE demonstrated today. Recommend 2-6 PT visits over the next 6-12 weeks to fit compression stockings. Goals:  1. Complete measurements for replacement custom flat knit compression stockings. Goal met. 2.  Establish effective and comfortable fit of custom flat knit knee high compression stockings. 3.  Instruct patient in effective skin management to reduce risk of infection. 4. Transition to phase II CDT. PLAN OF CARE:  Order custom flat knit compression stockings with custom measurements  Establish effective and comfortable fit of compression stockings   Return to phase II CDT. Recommendations and Planned Interventions:  Pt to fit compression garments at home upon receiving and notify clinic of any concerns regarding fit of garments to allow alterations to be requested. Patient has participated in goal setting and agrees to work toward plan of care. Patient was instructed to call if questions or concerns arise. Thank you for this referral.  Michele Genao, PT, CLT   Time Calculation: 49 mins    TREATMENT PLAN EFFECTIVE DATES:   6/25/2019 to 9/20/2019  I have read the above plan of care for [facility]. I certify the above prescribed services are required by this patient and are medically necessary.   The above plan of care has been developed in conjunction with the lymphedema/physical therapist.       Physician Signature: ____________________________________Date:______________

## 2019-06-27 PROBLEM — N18.30 CKD (CHRONIC KIDNEY DISEASE) STAGE 3, GFR 30-59 ML/MIN (HCC): Status: ACTIVE | Noted: 2019-06-27

## 2019-08-05 ENCOUNTER — HOSPITAL ENCOUNTER (OUTPATIENT)
Dept: PHYSICAL THERAPY | Age: 74
End: 2019-08-05

## 2020-02-05 ENCOUNTER — APPOINTMENT (OUTPATIENT)
Dept: PHYSICAL THERAPY | Age: 75
End: 2020-02-05

## 2020-03-17 ENCOUNTER — APPOINTMENT (OUTPATIENT)
Dept: PHYSICAL THERAPY | Age: 75
End: 2020-03-17

## 2020-04-24 ENCOUNTER — APPOINTMENT (OUTPATIENT)
Dept: PHYSICAL THERAPY | Age: 75
End: 2020-04-24

## 2021-03-01 ENCOUNTER — IMMUNIZATION (OUTPATIENT)
Dept: INTERNAL MEDICINE CLINIC | Age: 76
End: 2021-03-01
Payer: MEDICARE

## 2021-03-01 DIAGNOSIS — Z23 ENCOUNTER FOR IMMUNIZATION: Primary | ICD-10-CM

## 2021-03-01 PROCEDURE — 91300 COVID-19, MRNA, LNP-S, PF, 30MCG/0.3ML DOSE(PFIZER): CPT | Performed by: FAMILY MEDICINE

## 2021-03-01 PROCEDURE — 0001A COVID-19, MRNA, LNP-S, PF, 30MCG/0.3ML DOSE(PFIZER): CPT | Performed by: FAMILY MEDICINE

## 2021-03-22 ENCOUNTER — IMMUNIZATION (OUTPATIENT)
Dept: INTERNAL MEDICINE CLINIC | Age: 76
End: 2021-03-22
Payer: MEDICARE

## 2021-03-22 DIAGNOSIS — Z23 ENCOUNTER FOR IMMUNIZATION: Primary | ICD-10-CM

## 2021-03-22 PROCEDURE — 0002A COVID-19, MRNA, LNP-S, PF, 30MCG/0.3ML DOSE(PFIZER): CPT | Performed by: FAMILY MEDICINE

## 2021-03-22 PROCEDURE — 91300 COVID-19, MRNA, LNP-S, PF, 30MCG/0.3ML DOSE(PFIZER): CPT | Performed by: FAMILY MEDICINE

## 2021-07-01 ENCOUNTER — HOSPITAL ENCOUNTER (OUTPATIENT)
Dept: PHYSICAL THERAPY | Age: 76
Discharge: HOME | End: 2021-07-01
Payer: MEDICARE

## 2021-07-01 VITALS
BODY MASS INDEX: 36.73 KG/M2 | SYSTOLIC BLOOD PRESSURE: 168 MMHG | DIASTOLIC BLOOD PRESSURE: 68 MMHG | HEIGHT: 67 IN | WEIGHT: 234 LBS | HEART RATE: 69 BPM

## 2021-07-01 PROCEDURE — 97162 PT EVAL MOD COMPLEX 30 MIN: CPT

## 2021-07-01 PROCEDURE — 97535 SELF CARE MNGMENT TRAINING: CPT

## 2021-07-01 PROCEDURE — 97530 THERAPEUTIC ACTIVITIES: CPT

## 2021-07-01 NOTE — PROGRESS NOTES
[facility] [facility]  [address] [facility].  [address]  [address]       EVALUATION    NAME: Mike Dela Cruz AGE: 64 y.o. GENDER: female  DATE: 7/1/2021  REFERRING PHYSICIAN: Katlyn Llamas MD  HISTORY AND BACKGROUND:  Patient is a 63 y/o female with history LE lymphedema for greater than 12 years, returning here for evaluation of lymphedema to ensure home management program remaining effective and to be measured for replacement custom flat knit knee high CCL 2 stockings. Pt reports fall at home 6 months ago, reaching for item. Reports no cellulitis infections bilateral LE since last seen. Reports legs are \"puffy\" today. Lymphedema history as follows:    Patient received treatment at [facility] prior to TKR in 2008, having undergone phase I CDT. Patient has continued with wear of custom flat knit knee high compression stockings, having them replaced via fitter at [facility], with most recent pair purchased 1-2 years ago. Note garments covering only half of lower legs, with fit likely impaired by recent progression of lymphedema. Oral diuretic has been increased to address increase in limb size, with patient reporting taking 60 mg Furosemide 2x/day. States legs have reduced in size, however, physicians nor patient want high dose of diuretic to continue. Patient was seen at this clinic 2/28/2018-6/1/2018, for phase I CDT, fit with custom flat knit compression stockings and transitioned to home management of lymphedema, with patient last seen 6/25/2019 at this clinic due to patient limiting appointments due to Covid-19.    Primary Diagnosis:  · Primary lymphedema (Q82.0)  Other Treatment Diagnoses:   Abnormality of gait (other abnormalities of gait and mobility R26.89)   Swelling not relieved by elevation ( R60.9 edema)   Venous insufficiency I87.2;    L LE DVT post TKE 2008 (L03.114)   PE (I26.9) 2008  Morbid Obesity (E66.01)  Date of Onset: prior to 2008  Present Symptoms and Functional Limitations: Bilateral LE lymphedema managed with daily wear of custom flat knit compression stockings, however, stockings have not been replaced since 2019. Patient reports requiring assistance to doff compression stockings. Reports increase skin tightness/limb heaviness with lymphedema, reduced strength bilateral LE, wearing knee high stockings only at this time, per patient preference. Gait deficits noted, patient using straight cane for gait, transfers/bed mobility require additional time. One fall reported over the past 6 months as noted above. Lymphedema Life Impact Scale: 23/68; 31% impairment.   Past Medical History:   Past Medical History:   Diagnosis Date    Anemia, chronic disease 7/31/2012    Arrhythmia 2006, 2008    [address]    Arthritis     OSTEO    CAD (coronary artery disease)     BLOCKAGES BILATERAL CAROTID, SAW MD 1 MONTH AGO    Chronic pain     LOWER BACK    Depression     Diabetes ([address]     TYPE 2, INSULIN    Hypercholesterolemia     Hypertension     Hypothyroidism 12/29/2011    Macular degeneration 9/18/2015    Osteoporosis     Pulmonary embolism ([address]     Pulmonary hypertension, mild ([address] 2006    Restrictive airway disease     Thromboembolus ([address] 2009    LEFT KNEE AFTER REPLACEMENT    Thyroid disease     HYPOTHYROID    Unspecified sleep apnea     USES CPAP     Past Surgical History:   Procedure Laterality Date    ENDOSCOPY, COLON, DIAGNOSTIC      2003;neg    HX HEART CATHETERIZATION  2006, 2008    2 TIMES    HX TONSILLECTOMY  1951    TX ANESTH,SURGERY OF SHOULDER      TOTAL KNEE ARTHROPLASTY  2009    LEFT     Current Medications:    Current Outpatient Medications   Medication Sig    furosemide (LASIX) 40 mg tablet TAKE 1 AND 1/2 TABLETS BY  MOUTH TWO TIMES DAILY    glucose blood VI test strips (Accu-Chek SmartView Test Strip) strip Check BS TID.  potassium chloride SR (KLOR-CON 10) 10 mEq tablet TAKE 1 TABLET BY MOUTH  TWICE A DAY    cloNIDine HCL (CATAPRES) 0.2 mg tablet TAKE 1 TABLET BY MOUTH 3  TIMES DAILY    Insulin Syringes, Disposable, 1 mL syrg Use with Insulin as directed.  lancets misc Check BS TID.  atorvastatin (LIPITOR) 10 mg tablet TAKE 1 TABLET BY MOUTH  DAILY    colchicine 0.6 mg tablet Take 1 Tab by mouth daily as needed. For gout.  levothyroxine (SYNTHROID) 100 mcg tablet Take 100 mcg by mouth Daily (before breakfast).  Cholecalciferol, Vitamin D3, (VITAMIN D3) 1,000 unit cap Take 1,000 Units by mouth daily.  ferrous sulfate (SLOW FE) 142 mg (45 mg iron) ER tablet Take  by mouth Daily (before breakfast).  VIT C/AMANDA AC/LUT/COPPER/ZNOX (PRESERVISION LUTEIN PO) Take  by mouth two (2) times a day.  hydrALAZINE (APRESOLINE) 50 mg tablet Take 50 mg by mouth three (3) times daily.  metolazone (ZAROXOLYN) 2.5 mg tablet Take 2.5 mg by mouth. Twice a week.  ranolazine ER (RANEXA) 500 mg SR tablet Take 1,000 mg by mouth two (2) times a day.  diltiazem hcl 360 mg Tb24 Take 360 mg by mouth daily.  insulin regular (NOVOLIN R, HUMULIN R) 100 unit/mL injection by SubCUTAneous route. 14 units am, 4 units pm (adjusts prn).  insulin NPH (NOVOLIN N, HUMULIN N) 100 unit/mL injection by SubCUTAneous route once. 16 units in am, 8 units pm.    aspirin (ASPIRIN) 325 mg tablet Take 325 mg by mouth daily.  VIT B12/INTRINS FACT/FA CMB #2 (INTRINSI B12-FOLATE PO) Take  by mouth daily. No current facility-administered medications for this encounter. Allergies: Allergies   Allergen Reactions    Levaquin [Levofloxacin] Other (comments)     Prolonged QT interval.      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 Patient living with adult daughter.   Pt reports long-term history of LE lymphedema, well managed with wear of custom flat knit Juzo 3022SV daytime knee high stockings. Living Situation: Lives with adult daughter, who accompanies patient to appointment today. Trainable Caregiver?: patient states her daughter assists her as needed. Self-care/ADLs: mod I showers, with shower seat/hand held shower. Dresses mod I, additional time. Light meal prep. Pt reports donning knee high garments mod I with donning gloves, removing stockings at home with AD. Mobility: transfers straight cane, mod I, and additional time. Gait short community distances with straight cane, able to walk from front door of clinic building to clinic 100+ feet, additional time, using elevator to get to second floor. Patient does not drive, over 5 years. Daughter obtained chair lift for patient to use up and down stairs with bedroom on second floor. Sleeping Arrangement:  bed   Adaptive Equipment Owned: cane, rollator walker, shower seat, hand held shower, CPAP. Previous Therapy:  Seen at [address] [place] [address] for phase I CDT over 10 years ago, so she is familiar with outpatient treatment of lymphedema. States she has been having custom flat knit daytime compression stockings replaced at [facility] intermittently, last replaced a year and a half ago, with proximal aspect of garment mid calf. Seen at this clinic from 2/28/2018-6/1/2018 phase I CDT, cellulitis episode interrupting treatment. Returned for evaluation 12/2018/6/25/2019 with condition stable with wear of Juzo 3022SV knee high stockings, daily use of vasopneumatic pump at home. Compression/Lymphedema Equipment:  Knee high Juzo 3022SV custom flat knit stockings for daytime wear, replaced in 2019. Garments in need of replacement due to age, wear, adequate fit. Vasopneumatic pump for home use. SUBJECTIVE:   Patient reports she is doing well with home management of lymphedema with daily wear of custom flat knit stockings. Patient and daughter report fall at home within the last 6 months. Patient reports limiting appointments outside of home due to pandemic, with compression stockings currently worn over 3years old. Patient reports ankles are \"puffy\" today, expressing concern regarding compression garment measurements today. Patient reports vasopneumatic pump use every other day, no ill-effects such as SOB reported. Patient goals: evaluation and treatment of bilateral LE lymphedema. OBJECTIVE DATA SUMMARY:   EXAMINATION/PRESENTATION/DECISION MAKING:   Pain:  heaviness bilateral LE, skin tightness. Pain Scale 1: Numeric (0 - 10)  Pain Intensity 1: 4  Pain Location 1: Leg  Skin and Tissue Assessment:  Dermal Status:  [x]   Intact [x]  Dry   []  Tenuous [x] Flaky   []  Wound/lesion present []  Scars   []  Dermatitis    Texture/Consistency:  [x]  Boggy: foot/ankle [x]  Pitting Edema: +2 dorsal foot/ankle/distal lower leg bilateral LE.   []  Brawny []  Combination   [x]  Fibrotic/Woody: calf region bilateral LE. Pigmentation/Color Change:  []  Normal []  Hemosiderin   []  Red []  Erythematous   [x]  Hyperpigmented []  Hyperlipodermatosclerosis   Anomalies: na  []  Lymphorrhea []  Vesicles   []  Petechiae []  Warty Vercusis   []  Bullae []  Papilloma   Circulatory:  []  BELLE []  Varicosities:   []  Pulses:  []  Vascular studies ruled out DVT in past   [x]  DVT History: post TKR 2008, including PE. No DVT history since    Nails:  []   Normal  [x]   Fungus  Stemmers Sign: R>L, positive    Height:  Height: 5' 7\" (170.2 cm)  Weight:  Weight: 106.1 kg (234 lb)   BMI:  BMI (calculated): 36.6  (36 or greater: adversely affecting lymphedema)    Volumetric Measurements:   Right:  12,176.16 mL today; 11,432. 62 mL 6/25/2019; 11,654. 11 mL 12/2018; 00,467. 16 mL 2/28/2018 Left: 13,176.97 mL today;  11,907.25 mL 6/25/2019.11,749.64 mL 12/2018;  12,324.83 mL 2/28/2018   % Difference: 4.15%, L>R LE    (See scanned graph)  Range of Motion: bilateral hip flex 90; R knee flex 80; L knee flex 90; ankle DF -10 bilateral.    Strength: bilateral hip m 4/5; quad/ham 4+/5; anterior tibialis m 4/5. Sensation:  Decreased light touch, bilateral toes. Mobility:  Bed/Chair Mobility:  Modified independent and Additional time  Transfers:  Modified independent and Additional time    Sitting Balance:  good Standing Balance:  Fair+   Gait:  Modified independent and Additional time, straight cane, decreased stride length bilateral,  Moderately slow og. Wheelchair Mobility:  na   Endurance: Moderately compromised, gait short community distances with straight cane. Stairs:  Not assessed         Safety:  Patient is alert and oriented:  x4   Safety awareness:  intact   Fall Risk?:  Moderate, ambulates with straight cane. See gait assessment   Patient given written fall prevention handout: Yes   Precautions:  Standard lymphedema precautions to include avoiding blood pressure readings, injections and IVs or other procedures/acts that could lead to broken skin on affected area, and avoiding excessive heat, resistive activity or altitude without compression garment    LLISIn compliance with CMSs Claims Based Outcome Reporting, the following G-code set was chosen for this patient based on their primary functional limitation being treated: The outcome measure chosen to determine the severity of the functional limitation was the LLIS with a score of 21/68 which was correlated with the impairment scale. ?  Other PT/OT Primary Functional Limitations:     - CURRENT STATUS: CJ - 20%-39% impaired, limited or restricted    - GOAL STATUS: CI - 1%-19% impaired, limited or restricted    - D/C STATUS:  ---------------To be determined---------------     Physical Therapy Evaluation Charge Determination   History Examination Presentation Decision-Making   HIGH Complexity :3+ comorbidities / personal factors will impact the outcome/ POC  MEDIUM Complexity : 3 Standardized tests and measures addressing body structure, function, activity limitation and / or participation in recreation  MEDIUM Complexity : Evolving with changing characteristics  Other outcome measures LLIS  MEDIUM      Based on the above components, the patient evaluation is determined to be of the following complexity level: MEDIUM    Evaluation Time: 12:39-1:04 pm    25 minutes    TREATMENT PROVIDED:   1. Treatment description:  Due to elevated bilateral LE limb volume measurements, patient advised returning to decongestive phase of treatment indicated prior to replacing custom flat knit compression stockings. Patient advised fitting adjustable velcro compression garment allowing for fewer treatments in clinic vs multi-layer compression bandaging in clinic 2x/week are options. Demonstrated application of Juzo compression wrap, with patient able to remove compression garment, however, unable to don garment effectively. Patient and daughter discussed treatment options, with decision made for patient to return in phase I CDT in clinic. Patient reports she does have compression bandaging supplies, to return to treatment with her supplies. Pt to continue daily skin care, cleansing with soap and water and monitoring any breaks in skin for s/s of infection. Patient provided with information regarding lotion options, including Eucerin Original and Medline Skin Repair Cream. To return daily use of vasopneumatic pump, discontinuing with onset of SOB. Current compression stockings fit adequately, with patient advised continuing with daily wear, discontinuing if garments become uncomfortable, with s/s of infection, change in sensation/circulation. Home management instructions as follows regarding compression garment wear until returning to phase I CDT:  Compression garment routine:  1. Apply daytime compression stocking to clean, dry extremity first thing in the morning, EVERY MORNING.     2. Wear compression garments until bedtime, adjusting throughout the day as needed should garment wrinkle or crease. Problem areas can be at joints, and top of garment, ensuring proximal aspect of garment positioned appropriately on extremity. 3. Launder garment nightly either by hand or washing machine in a garment bag or pillowcase. Use mild detergent with NO FABRIC SOFTENER, NO BLEACH, NO WOOLITE. Wash in cool/warm water. 4. Dry garment in dryer on low heat right side out in garment bag or pillowcase. 5. Perform skin care to extremity, cleansing skin daily with soap and water, and using low pH lotion, upward strokes to stimulate lymphatic vessels. Perform vasopneumatic pump use. 6. Daytime compression grments are NOT safe to sleep in, so remove at bedtime. 7. Perform exercise program with compression garments on. Conditions under which to discontinue home management of lymphedema and seek medical attention:   Signs/Symptoms of infection include:  Fever, flu-like symptoms, pain/redness/warmthin extremity, sometimes with increase in swelling present. Stop wearing your compression garment if this occurs. Call your doctor immediately or go to the Emergency room to address potential infection. Remove compression with changes in circulation, numbness in extremity different from what you may experience when not wearing compression garment, pain, unexplained shortness of breath. Notify clinic if swelling increase noted prior to next scheduled appt. Night time compression may be needed for optimal management of lymphedema. Return in 2 weeks for follow up appt. Daily use of vasopneumatic pump as tolerated. Discontinue pump use/compression garment wear based on precautions noted above.   Treatment time: 1:04-1:35  pm  Minutes: 31    Self care  2    ASSESSMENT:   Charline Griffin is a 64 y.o. female who presents with stage II lymphedema, bilateral LE, elevated limb volume despite daily wear of Juzo 3022SV knee highs. Patient most recently seen in clinic 6/25/2019, reporting she has not replaced custom flat knit compression stockings since that time. Patient arrives with stockings donned, reporting awareness that legs are more swollen today. Comparing limb volumes measurements bilateral LE completed on 6/25/2019 comparing with today, R LE is elevated by 743.54 mL, L LE elevated by 1269.72 mL. Patient requires bilateral LE decongestion prior to ordering custom flat knit compression stockings. Patient/daughter in agreement with plan. Patient advised to focus on skin care with LE skin noted to be dry/flaky, daily use of vasopneumatic pump, and wear of current Juzo 3022SV compression stockings following precautions above regarding discontinuing pump use/compression stocking wear. Pt verbalizes good understanding of instruction. See scanned graph. Pt reports no cellulitis episodes since last seen. Pt reports continues taking Lasix per MD order, 60 mg dose 2x/day. Return to phase I CDT including MLD, MLB, skin care, therapeutic exercise. This care is medically necessary due to the infection risk with lymphedema, and to improve functional activities. CDT is necessary to resolve swelling to allow patient to return to wearing normal clothes/footwear, and prevent worsening of symptoms, such as venous stasis ulcerations, infections, or hospitalizations. Patient will be independent with home program strategies to allow improved ADL ability and mobility and to allow patient to return to greatest functional independence. Rehabilitation potential is considered to be Good for goals. Factors which may influence rehabilitation potential include good family support having positive impact on home management of lymphedema. Recommend 10-15 PT visits over the next 12 weeks for treatment of bilateral LE lymphedema.         PLAN OF CARE:  Manual lymphatic drainage  Compression bandaging bilateral LE  Skin care education  Lymphedema specific exercise  Compression garment order and fit     Goals:  Short term: 6-8 weeks  1. Instruct the patient to be independent with proper skin care to prevent future skin breakdown and decrease the potential risk for infections that are associated with Lymphedema. 2. Patient will be independent with a personal lymphedema exercise program to assist with the lymphatic flow and reduce limb volume bilateral LE by 500-600 mL. 3. Patient will understand the signs and symptoms of acute infection. Long term: 8-12 weeks  1. Patient will have knowledge of the compression options and acquire a safe and appropriate daytime and night time compression system to prevent    re-accumulation of fluid. 2.  Patient or family will be able to don/doff garments independently. Garment system effectiveness will be evaluated prior to discharge for better long-term   management and outcomes. 3. Patient to report 5% improvement in functional outcomes measure to allow reduced complaints of pain/tightness bilateral LE. 4.   To transition patient to independent restorative phase of CDT. Patient has participated in goal setting and agrees to work toward plan of care. Patient was instructed to call if questions or concerns arise. Thank you for this referral.  Clinton Castro PT, [facility]   Time Calculation: 56 mins    TREATMENT PLAN EFFECTIVE DATES:   7/1/2021 to 9/28/2021  I have read the above plan of care for [facility]. I certify the above prescribed services are required by this patient and are medically necessary.   The above plan of care has been developed in conjunction with the lymphedema/physical therapist.       Physician Signature: ____________________________________Date:______________

## 2021-07-01 NOTE — PROGRESS NOTES
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 [x]   I87.2 Venous Insufficiency   []   I89.0 Lymphedema, other secondary  []   I83.019 Venous Stasis Ulcer LE, Right   []   I89.9 Unspecified Lymphatic Disorder []   I83.029 Venous Stasis Ulcer LE, Left   [x]   R60.9 Swelling not relieved by elevation [x]   Q82.0 Hereditary/ Congenital Lymphedema   []   C50.211 Malignant neoplasm of breast, Right []   G89.3  Cancer associated pain   []   C50.212 Malignant neoplasm of breast, Left []   L03.115 LE Cellulitis, Right   [x]  26.89 Abnormality of gait []   L03.116 LE Cellulitis, Left     Recommended Product for Diagnosis as noted above:  Units Upper Extremity Rt Lt Units Lower Extremity Rt Lt    Compression Multi-Layered Bandages   4 Compression Multi-Layered Bandages x x    Circ-Aid, Ready Wrap, Sigvaris Arm   2 Inelastic binders (Circ-Aid, Farrow)  [x]Foot   [x]Below Knee   [x]Knee   [x]Thigh x x    Circ-Aid Ready Wrap, Sigvaris hand    Kalia Miquel, Leg, night use x x    Tribute Arm, night use   2 Tribute Leg, night use      Kalia Miquel Arm, night use    Malik Sleeve Leg/ Foot, night use      Vasopneumatic Compression Pump  []Arm []Arm w/Shoulder  []Arm w/Vest    Vasopneumatic Compression Pump  []Leg         []Trunk       []Pant      Gradiant Compression Sleeves & Gloves  Custom/ RM Arm:    []CCL1 []CCL2 []CCL3  Custom/ RM Glove: []CCL1 []CCL2 []CCL3     4 Gradient Compression Stockings  Custom/ RM Lower Extremity:   [x]CCL1       [x]CCL2         []CCL3   [x]Knee      [x]Thigh        []Full Length x x    Malik sleeve arm w/ hand, night use    Tribute Wrap, night use      Compression Bra    Compression Vest          Compression Multi-Layered Bandages     The above patient was referred for treatment of Lymphedema due to the diagnosis indicated above. Lymphedema is a progressive and chronic condition. It may cause acute infections, muscle atrophy, discomfort, and connective tissue fibrosis with irreversible tissue damage, decreased ROM in the affected limb and diminished functional mobility possibly interfering with independence and ability to work. Recurrent infections and wound complications that commonly occur with Lymphedema often require hospitalization and extensive wound care, thus increasing medical costs. The patient has received complete decongestive therapy which includes manual lymphatic drainage, lymphedema specific exercises, compression bandaging, and hygiene/skin care. Goals of therapy are to reduce the edema and prevent re-accumulation of fluid with its complications. It is medically necessary for this patient to have daytime garments to control this condition. They will need 2 sets (one set to wear and one set to wash for adequate skin care and wearing time). Garments must be replaced every 4-6 months for effectiveness. There are no substitutes available that offer acceptable compression treatment for this Lymphedema patient. If further information is requested, please contact our certified lymphedema therapists at [phone].     [] Dora Johnson, PT, DPT, ANANDA  [x] Mahad Rangel PT, [facility]  [] Maikel Ndiaye, PT, DPT, [facility]      Printed MD Name  MD Signature  Date

## 2021-07-19 ENCOUNTER — APPOINTMENT (OUTPATIENT)
Dept: PHYSICAL THERAPY | Age: 76
End: 2021-07-19
Payer: MEDICARE

## 2021-07-22 ENCOUNTER — HOSPITAL ENCOUNTER (OUTPATIENT)
Dept: PHYSICAL THERAPY | Age: 76
Discharge: HOME | End: 2021-07-22
Payer: MEDICARE

## 2021-07-22 PROCEDURE — 97140 MANUAL THERAPY 1/> REGIONS: CPT

## 2021-07-22 NOTE — PROGRESS NOTES
_                                    [facility]  [address] [place] [address].  [address]  Christianne Brand        LYMPHEDEMA THERAPY  VISIT: 2    [x]                  Daily note               []                 30 day/10th visit progress note    NAME: Ruba Pineda  DATE: 7/22/2021    GOALS  Goals:  Short term: 6-8 weeks  1. Instruct the patient to be independent with proper skin care to prevent future skin breakdown and decrease the potential risk for infections that are associated with Lymphedema. 2. Patient will be independent with a personal lymphedema exercise program to assist with the lymphatic flow and reduce limb volume bilateral LE by 500-600 mL. 3. Patient will understand the signs and symptoms of acute infection. Long term: 8-12 weeks  1. Patient will have knowledge of the compression options and acquire a safe and appropriate daytime and night time compression system to prevent                             re-accumulation of fluid. 2.  Patient or family will be able to don/doff garments independently. Garment system effectiveness will be evaluated prior to discharge for better long-term              management and outcomes. 3. Patient to report 5% improvement in functional outcomes measure to allow reduced complaints of pain/tightness bilateral LE. 4.   To transition patient to independent restorative phase of CDT.          SUBJECTIVE REPORT: Patient arrived to session with daughter eager to re-start phase I of CDT.    Pain:0/10                                Gait:  Modified indep using SPC  []  Independent gait without any assistive device for community distances  ADLs:  indep  Treatment Response:    [x]   Patient reviewed packet received at evaluation  []   Patient completed home program as prescribed  []   Patient not fully compliant with home program to date  []   Patient waiting on compression garment arrival  Function: Pt slightly unsteady with MLB in place, but safe with SPV from daughter  []   Patient requiring less assistance with completion of home program  []   ADLs are requiring less assistance   []   Patient able to return to work/leisure activities    Lymphedema Life Impact Scale: NT  Weight: NT  TREATMENT AND OBJECTIVE DATA SUMMARY:   Patient/Family Education:      Educated in skin care: [x]   Skin care products  [x]   Hygiene  [x]  Prevention of cellulitis  []   Wound care     Educated in exercise: []   Walking program  []   Rohini ball routine  []   Stick routine  []   ROM routine     Instructed in self MLD:   Written sequence given and reviewed with patient as well as demonstration and instruction during MLD portion of the session. Instructed in don/doff of compression system:   [x]   Multi layer bandage (MLB) donning principles and wear precautions  []   Day garments  []   Night garments    Compression bandaging instructions:  1. Compression bandaging will be applied twice a week by therapist in clinic, with adjustments to be made at home as indicated if bandaging becomes loose or uncomfortable. 2. If tolerated, remain bandaged between appointments with therapist, removing under the following conditionsDO NOT WAIT FOR A RETURN PHONE [address]:    -Numbness/tingling in extremity different from what you have experienced without the bandages in place.  -Compromise in circulation, monitoring blood refill into toes after applying gentle pressure to toes.  -Onset of pain in extremity that is sudden and severe in nature. -Redness, warmth in limb, and/or fever, flu-like symptoms, which may indicate infection. If this occurs, call your doctor right away. If you note a sudden increase in swelling in extremity, with or without redness/warmth, go to the emergency room as soon as possible to have blood clot ruled out.       3. Compression bandaging supplies that can be laundered are the brown compression wraps and any foam applied for padding. Launder in washer/dryer with NO fabric softener, bleach, woolite. Dry on a low heat. 4. Once compression garments are ordered, compression bandaging will be continued until garments arrive for fitting. This process can take several weeks. Therapeutic Exercise/Procedure  minutes   Treatment time:   Stick exercise program: N/A   Free exercises/ROM: N/A   Home program: Patient to perform daily to BID:  []   Skin care  [x]   Deep abdominal breathing  []   Exercise routine  [x]   Walking program  [x]   Rest in supine   []   Compression bandage  []   Compression garments  []   Vasopneumatic device  []   Wound care  []   Self MLD  [x]   Bring supplies to each therapy visit  [x]   Purchase necessary supplies  []   Weight loss program  []   Follow up with       Rationale: Exercise will increase the lymph angiomotoricity and tissue pressure of the skin and thus decrease swelling. Modalities  minutes   Treatment time:   Vasopneumatic pump:      Manual Therapy:  Treatment time: 10:12-11:10    Manual Lymphatic Drainage (MLD)  58 minutes     Area to decongest:    [] UE          []  Right     []  Left      [] Trunk      [x] LE             [x]  Right     [x]  Left      [] Trunk         Sequence used and effectiveness: [] Secondary/Primary sequence for upper extremity with / without trunk involvement    [x] Secondary/Primary sequence for lower extremities with / without trunk involvement: RLE only today     [] Modifications were made to manual lymph drainage sequence to exclude cervical techniques secondary to patient's age    [] Self MLD sequence/techniques/hand strokes   Skin/wound care/debridement: Aquaphor applied to BLE's in upward strokes     Upper/Lower extremity compression: Applied multi-layer compression bandaging to:     Below knee [x]  Thigh high  []   Upper Extremity []    Right []   Left []    Bilateral [x]    The following multi-layer bandages were applied:  [x]  Protouch stockinette           []  Silver Stockinette             []  Tg soft  []  Comperm    []  Transelast     Padding layer:  [x]  Cast padding- R foot/ankle and bulking up L ankle         [x]  Fleece- L foot/ankle only due to limited patient supplies    Foam:  [] 10cm roll  [x] 12cm roll  [] 15cm roll  [] Gray foam  [] Komprex  [] Komprex 2      Short stretch bandages:   [] 4cm  [x] 6cm  [x] 8cm  [x] 10cm  [] 12cm    Tubigrip applied at proximal aspect to secure taped areas. Educated patient in multi-layer bandaging donning principles and precautions. Patient brought personal bandages from home from previous CDT treatment. Pt does not have enough materials-- estimate provided to patient and daughter for items that need to be purchased, and sent to Rep. Kinesiotaping:    Girth/Volume measurement: Reviewed results of volumetric measurements taken on evaluation. TOTAL TREATMENT 58 mins     Circumferential Limb Measurements:            LLE Pre Girth Measurement  5th Tuberosity (cm): 24  Ankle (cm): 25.6  Calf (cm): 41.5  Mid Knee (cm): 53  Thigh (cm): 60.5  Groin (cm): 65   RLE Pre Girth Measurement (cm)  5th Tuberosity (cm): 24.4  Ankle (cm): 25  Calf (cm): 41.5  Mid Knee (cm): 51.5  Thigh (cm): 60.5  Groin (cm): 67           ASSESSMENT:   Treatment effectiveness and tolerance: Pt tolerated MLD RLE and BLE MLB well today. Patient and patient's daughter verbalized understanding of purchasing more material to have full set of bandaging material, and estimate send to [employer] today. Progress toward goals: ongoing     PLAN OF CARE:   Changes to the plan of care: 1.  Assess tolerance to MLD/MLB   Frequency: [x]  2 times a week  []  Weekly  []  Biweekly  []  Monthly   Other:        Billie Goodman, PT, DPT, GCS, CLT-MARCO

## 2021-07-26 ENCOUNTER — HOSPITAL ENCOUNTER (OUTPATIENT)
Dept: PHYSICAL THERAPY | Age: 76
Discharge: HOME | End: 2021-07-26
Payer: MEDICARE

## 2021-07-26 PROCEDURE — 97140 MANUAL THERAPY 1/> REGIONS: CPT

## 2021-07-26 NOTE — PROGRESS NOTES
_                                    [facility]  [address] [place] [address].  [facility] [address]  Christianne Brand        LYMPHEDEMA THERAPY  VISIT: 3    [x]                  Daily note               []                 30 day/10th visit progress note    NAME: Jada Covarrubias  DATE: 7/26/2021    GOALS  Goals:  Short term: 6-8 weeks  1. Instruct the patient to be independent with proper skin care to prevent future skin breakdown and decrease the potential risk for infections that are associated with Lymphedema. 2. Patient will be independent with a personal lymphedema exercise program to assist with the lymphatic flow and reduce limb volume bilateral LE by 500-600 mL. 3. Patient will understand the signs and symptoms of acute infection. Long term: 8-12 weeks  1. Patient will have knowledge of the compression options and acquire a safe and appropriate daytime and night time compression system to prevent                             re-accumulation of fluid. 2.  Patient or family will be able to don/doff garments independently. Garment system effectiveness will be evaluated prior to discharge for better long-term              management and outcomes. 3. Patient to report 5% improvement in functional outcomes measure to allow reduced complaints of pain/tightness bilateral LE. 4.   To transition patient to independent restorative phase of CDT.          SUBJECTIVE REPORT: Patient arrived to appt with MLB intact B LEs, reports good tolerance after last visit. Pt agreeable to proceeding with treatment.     Pain:0/10                                Gait:  Modified indep using SPC  []  Independent gait without any assistive device for community distances  ADLs:  indep  Treatment Response:    [x]   Patient reviewed packet received at evaluation  []   Patient completed home program as prescribed  []   Patient not fully compliant with home program to date  [] Patient waiting on compression garment arrival  Function: Pt slightly unsteady with MLB in place, but safe with SPV from daughter  []   Patient requiring less assistance with completion of home program  []   ADLs are requiring less assistance   []   Patient able to return to work/leisure activities    Lymphedema Life Impact Scale: NT  Weight: NT  TREATMENT AND OBJECTIVE DATA SUMMARY:   Patient/Family Education:      Educated in skin care: [x]   Skin care products  [x]   Hygiene  [x]  Prevention of cellulitis  []   Wound care     Educated in exercise: []   Walking program  []   Rohini ball routine  []   Stick routine  []   ROM routine     Instructed in self MLD:   Written sequence given and reviewed with patient as well as demonstration and instruction during MLD portion of the session. Instructed in don/doff of compression system:   [x]   Multi layer bandage (MLB) donning principles and wear precautions  []   Day garments  []   Night garments    Compression bandaging instructions:  1. Compression bandaging will be applied twice a week by therapist in clinic, with adjustments to be made at home as indicated if bandaging becomes loose or uncomfortable. 2. If tolerated, remain bandaged between appointments with therapist, removing under the following conditionsDO NOT WAIT FOR A RETURN PHONE [address]:  -Numbness/tingling in extremity different from what you have experienced without the bandages in place.  -Compromise in circulation, monitoring blood refill into toes after applying gentle pressure to toes.  -Onset of pain in extremity that is sudden and severe in nature. -Redness, warmth in limb, and/or fever, flu-like symptoms, which may indicate infection. If this occurs, call your doctor right away. If you note a sudden increase in swelling in extremity, with or without redness/warmth, go to the emergency room as soon as possible to have blood clot ruled out.     3. Compression bandaging supplies that can be laundered are the brown compression wraps and any foam applied for padding. Launder in washer/dryer with NO fabric softener, bleach, woolite. Dry on a low heat. 4. Once compression garments are ordered, compression bandaging will be continued until garments arrive for fitting. This process can take several weeks. Therapeutic Exercise/Procedure  minutes   Treatment time:   Stick exercise program: N/A   Free exercises/ROM: N/A   Home program: Patient to perform daily to BID:  [x]   Skin care  [x]   Deep abdominal breathing  [x]   Exercise routine  [x]   Walking program  [x]   Rest in supine   [x]   Compression bandage  []   Compression garments  []   Vasopneumatic device  []   Wound care  []   Self MLD  [x]   Bring supplies to each therapy visit  [x]   Purchase necessary supplies  []   Weight loss program  []   Follow up with       Rationale: Exercise will increase the lymph angiomotoricity and tissue pressure of the skin and thus decrease swelling. Modalities  minutes   Treatment time:   Vasopneumatic pump:      Manual Therapy:  Treatment time: 10:20 - 11:15 am 55 minutes     Area to decongest:    [] UE          []  Right     []  Left      [] Trunk      [x] LE             [x]  Right     [x]  Left      [] Trunk         Sequence used and effectiveness:  Manual therapy  [] Secondary/Primary sequence for upper extremity with / without trunk involvement    [x] Secondary/Primary sequence for lower extremities with / without trunk involvement: B LE full leg sequence, diaphragmatic breathing pre and post-MLD    [] Modifications were made to manual lymph drainage sequence to exclude cervical techniques secondary to patient's age    [] Self MLD sequence/techniques/hand strokes   Skin/wound care/debridement: Skin intact. Eucerin applied to BLE's in upward strokes. Upper/Lower extremity compression:  Manual therapy  Applied multi-layer compression bandaging to:     Below knee [x]  Thigh high  [] Upper Extremity []    Right []   Left []    Bilateral [x]    The following multi-layer bandages were applied:  [x]  Protouch stockinette           []  Silver Stockinette             []  Tg soft  []  Comperm    []  Transelast     Padding layer:  [x]  Cast padding- B feet and ankles   []  Fleece    Foam:  [] 10cm roll  [x] 12cm roll  [] 15cm roll  [] Gray foam  [] Komprex  [] Komprex 2      Short stretch bandages:   [] 4cm  [x] 6cm - noa sandal technique   [x] 8cm  [x] 10cm  [] 12cm    Tubigrip applied at proximal aspect to secure taped areas. Educated patient in multi-layer bandaging donning principles and precautions. Kinesiotaping:    Girth/Volume measurement: Reviewed results of volumetric measurements taken on evaluation. TOTAL TREATMENT 55 mins     Circumferential Limb Measurements:            LLE Pre Girth Measurement  5th Tuberosity (cm): 24.6  Ankle (cm): 25  Calf (cm): 40.6   RLE Pre Girth Measurement (cm)  5th Tuberosity (cm): 24.1  Ankle (cm): 24.2  Calf (cm): 38.4           ASSESSMENT:   Treatment effectiveness and tolerance: Pt tolerated MLD BLE and BLE MLB well today. Will plan to measure for replacement custom stockings once limb volumes reduced. Progress toward goals: ongoing     PLAN OF CARE:   Changes to the plan of care: 1. Assess tolerance to MLD/MLB. 2. Recheck volumes next 1-2 visits. Frequency: [x]  2 times a week  []  Weekly  []  Biweekly  []  Monthly   Other:        Emilio Call.  Jazlyn, PT, DPT, CLBAKARI

## 2021-07-28 ENCOUNTER — HOSPITAL ENCOUNTER (OUTPATIENT)
Dept: PHYSICAL THERAPY | Age: 76
Discharge: HOME | End: 2021-07-28
Payer: MEDICARE

## 2021-07-28 PROCEDURE — 97140 MANUAL THERAPY 1/> REGIONS: CPT

## 2021-07-28 NOTE — PROGRESS NOTES
_                                    [facility]  [address] [facility]  [address]  Christianne Brand        LYMPHEDEMA THERAPY  VISIT: 4    [x]                  Daily note               []                 30 day/10th visit progress note    NAME: Don Mujica  DATE: 7/28/2021    GOALS  Goals:  Short term: 6-8 weeks  1. Instruct the patient to be independent with proper skin care to prevent future skin breakdown and decrease the potential risk for infections that are associated with Lymphedema. 2. Patient will be independent with a personal lymphedema exercise program to assist with the lymphatic flow and reduce limb volume bilateral LE by 500-600 mL. 3. Patient will understand the signs and symptoms of acute infection. Long term: 8-12 weeks  1. Patient will have knowledge of the compression options and acquire a safe and appropriate daytime and night time compression system to prevent                             re-accumulation of fluid. 2.  Patient or family will be able to don/doff garments independently. Garment system effectiveness will be evaluated prior to discharge for better long-term              management and outcomes. 3. Patient to report 5% improvement in functional outcomes measure to allow reduced complaints of pain/tightness bilateral LE. 4.   To transition patient to independent restorative phase of CDT.          SUBJECTIVE REPORT: Patient arrived to appt with MLB intact B LEs, reports good tolerance after last visit. Pt agreeable to proceeding with treatment.     Pain: 0/10                                Gait:  Modified indep using SPC  []  Independent gait without any assistive device for community distances  ADLs:  indep  Treatment Response:    [x]   Patient reviewed packet received at evaluation  [x]   Patient completed home program as prescribed  []   Patient not fully compliant with home program to date  [] Patient waiting on compression garment arrival  Function: limited by chronic LE swelling   []   Patient requiring less assistance with completion of home program  []   ADLs are requiring less assistance   []   Patient able to return to work/leisure activities    Lymphedema Life Impact Scale: NT  Weight: NT  TREATMENT AND OBJECTIVE DATA SUMMARY:   Patient/Family Education:      Educated in skin care: [x]   Skin care products  [x]   Hygiene  [x]  Prevention of cellulitis  []   Wound care     Educated in exercise: []   Walking program  []   Rohini ball routine  []   Stick routine  []   ROM routine     Instructed in self MLD:   Written sequence given and reviewed with patient as well as demonstration and instruction during MLD portion of the session. Instructed in don/doff of compression system:   [x]   Multi layer bandage (MLB) donning principles and wear precautions  []   Day garments  []   Night garments    Compression bandaging instructions:  1. Compression bandaging will be applied twice a week by therapist in clinic, with adjustments to be made at home as indicated if bandaging becomes loose or uncomfortable. 2. If tolerated, remain bandaged between appointments with therapist, removing under the following conditionsDO NOT WAIT FOR A RETURN PHONE [address]:  -Numbness/tingling in extremity different from what you have experienced without the bandages in place.  -Compromise in circulation, monitoring blood refill into toes after applying gentle pressure to toes.  -Onset of pain in extremity that is sudden and severe in nature. -Redness, warmth in limb, and/or fever, flu-like symptoms, which may indicate infection. If this occurs, call your doctor right away. If you note a sudden increase in swelling in extremity, with or without redness/warmth, go to the emergency room as soon as possible to have blood clot ruled out.     3. Compression bandaging supplies that can be laundered are the Yo De La Fuente compression wraps and any foam applied for padding. Launder in washer/dryer with NO fabric softener, bleach, woolite. Dry on a low heat. 4. Once compression garments are ordered, compression bandaging will be continued until garments arrive for fitting. This process can take several weeks. Therapeutic Exercise/Procedure  minutes   Treatment time:   Stick exercise program: N/A   Free exercises/ROM: N/A   Home program: Patient to perform daily to BID:  [x]   Skin care  [x]   Deep abdominal breathing  [x]   Exercise routine  [x]   Walking program  [x]   Rest in supine   [x]   Compression bandage  []   Compression garments  []   Vasopneumatic device  []   Wound care  [x]   Self MLD  [x]   Bring supplies to each therapy visit  [x]   Purchase necessary supplies  []   Weight loss program  []   Follow up with       Rationale: Exercise will increase the lymph angiomotoricity and tissue pressure of the skin and thus decrease swelling. Modalities  minutes   Treatment time:   Vasopneumatic pump:      Manual Therapy:  Treatment time: 10:15 - 11:10 am 55 minutes     Area to decongest:    [] UE          []  Right     []  Left      [] Trunk      [x] LE             [x]  Right     [x]  Left      [] Trunk         Sequence used and effectiveness:  Manual therapy  [] Secondary/Primary sequence for upper extremity with / without trunk involvement    [x] Secondary/Primary sequence for lower extremities with / without trunk involvement: B LE full leg sequence, diaphragmatic breathing pre and post-MLD    [] Modifications were made to manual lymph drainage sequence to exclude cervical techniques secondary to patient's age    [] Self MLD sequence/techniques/hand strokes   Skin/wound care/debridement: Skin intact. Eucerin applied to BLE's in upward strokes. Upper/Lower extremity compression:  Manual therapy  Applied multi-layer compression bandaging to:     Below knee [x]  Thigh high  []   Upper Extremity []    Right []   Left []    Bilateral [x]    The following multi-layer bandages were applied:  [x]  Protouch stockinette           []  Silver Stockinette             []  Tg soft  []  Comperm    []  Transelast     Padding layer:  [x]  Cast padding to bulk out ankles    [x]  Fleece    Foam:  [] 10cm roll  [x] 12cm roll  [] 15cm roll  [] Gray foam  [] Komprex  [] Komprex 2      Short stretch bandages:   [] 4cm  [x] 6cm - noa sandal technique   [x] 8cm  [x] 10cm  [] 12cm    Tubigrip applied at proximal aspect to secure taped areas. Educated patient in multi-layer bandaging donning principles and precautions. Kinesiotaping:    Girth/Volume measurement: Reviewed results of volumetric measurements taken on evaluation. TOTAL TREATMENT 55 mins     Circumferential Limb Measurements:                            ASSESSMENT:   Treatment effectiveness and tolerance: Pt tolerated MLD BLE and BLE MLB well today. Will plan to recheck volumes next visit and determine readiness to measure for replacement custom stockings. Progress toward goals: ongoing     PLAN OF CARE:   Changes to the plan of care: 1. Assess tolerance to MLD/MLB. 2. Recheck volumes next 1-2 visits. 3. Measure for custom stockings next visit? Frequency: [x]  2 times a week  []  Weekly  []  Biweekly  []  Monthly   Other:        Hugo Hobson, PT, DPT, CLT-MARCO

## 2021-08-02 ENCOUNTER — HOSPITAL ENCOUNTER (OUTPATIENT)
Dept: PHYSICAL THERAPY | Age: 76
Discharge: HOME | End: 2021-08-02
Payer: MEDICARE

## 2021-08-02 PROCEDURE — 97140 MANUAL THERAPY 1/> REGIONS: CPT

## 2021-08-02 NOTE — PROGRESS NOTES
_                                    [facility]  [address] [place] [address].  [address]  Christianne Brand        LYMPHEDEMA THERAPY  VISIT: 5    [x]                  Daily note               []                 30 day/10th visit progress note    NAME: Lidia Brito  DATE: 8/2/2021    GOALS  Goals:  Short term: 6-8 weeks  1. Instruct the patient to be independent with proper skin care to prevent future skin breakdown and decrease the potential risk for infections that are associated with Lymphedema. 2. Patient will be independent with a personal lymphedema exercise program to assist with the lymphatic flow and reduce limb volume bilateral LE by 500-600 mL. 3. Patient will understand the signs and symptoms of acute infection. Long term: 8-12 weeks  1. Patient will have knowledge of the compression options and acquire a safe and appropriate daytime and night time compression system to prevent                             re-accumulation of fluid. 2.  Patient or family will be able to don/doff garments independently. Garment system effectiveness will be evaluated prior to discharge for better long-term              management and outcomes. 3. Patient to report 5% improvement in functional outcomes measure to allow reduced complaints of pain/tightness bilateral LE. 4.   To transition patient to independent restorative phase of CDT.          SUBJECTIVE REPORT: Patient arrived to appt with MLB intact B LEs, reports good tolerance after last visit. Pt agreeable to proceeding with treatment.     Pain: 0/10                                Gait:  Modified indep using SPC  []  Independent gait without any assistive device for community distances  ADLs:  indep  Treatment Response:    [x]   Patient reviewed packet received at evaluation  [x]   Patient completed home program as prescribed  []   Patient not fully compliant with home program to date  [] Patient waiting on compression garment arrival  Function: limited by chronic LE swelling   []   Patient requiring less assistance with completion of home program  []   ADLs are requiring less assistance   []   Patient able to return to work/leisure activities    Lymphedema Life Impact Scale: NT  Weight: NT  TREATMENT AND OBJECTIVE DATA SUMMARY:   Patient/Family Education:      Educated in skin care: [x]   Skin care products  [x]   Hygiene  [x]  Prevention of cellulitis  []   Wound care     Educated in exercise: []   Walking program  []   Rohini ball routine  []   Stick routine  []   ROM routine     Instructed in self MLD:   Written sequence given and reviewed with patient as well as demonstration and instruction during MLD portion of the session. Instructed in don/doff of compression system:   [x]   Multi layer bandage (MLB) donning principles and wear precautions  []   Day garments  []   Night garments    Compression bandaging instructions:  1. Compression bandaging will be applied twice a week by therapist in clinic, with adjustments to be made at home as indicated if bandaging becomes loose or uncomfortable. 2. If tolerated, remain bandaged between appointments with therapist, removing under the following conditionsDO NOT WAIT FOR A RETURN PHONE [address]:  -Numbness/tingling in extremity different from what you have experienced without the bandages in place.  -Compromise in circulation, monitoring blood refill into toes after applying gentle pressure to toes.  -Onset of pain in extremity that is sudden and severe in nature. -Redness, warmth in limb, and/or fever, flu-like symptoms, which may indicate infection. If this occurs, call your doctor right away. If you note a sudden increase in swelling in extremity, with or without redness/warmth, go to the emergency room as soon as possible to have blood clot ruled out.     3. Compression bandaging supplies that can be laundered are the Tabitha Patino compression wraps and any foam applied for padding. Launder in washer/dryer with NO fabric softener, bleach, woolite. Dry on a low heat. 4. Once compression garments are ordered, compression bandaging will be continued until garments arrive for fitting. This process can take several weeks. Therapeutic Exercise/Procedure  minutes   Treatment time:   Stick exercise program: N/A   Free exercises/ROM: N/A   Home program: Patient to perform daily to BID:  [x]   Skin care  [x]   Deep abdominal breathing  [x]   Exercise routine  [x]   Walking program  [x]   Rest in supine   [x]   Compression bandage  []   Compression garments  []   Vasopneumatic device  []   Wound care  [x]   Self MLD  [x]   Bring supplies to each therapy visit  [x]   Purchase necessary supplies  []   Weight loss program  []   Follow up with       Rationale: Exercise will increase the lymph angiomotoricity and tissue pressure of the skin and thus decrease swelling. Modalities  minutes   Treatment time:   Vasopneumatic pump:      Manual Therapy:  Treatment time: 11:10 am- 12:12 pm 62 minutes   :  Area to decongest:    [] UE          []  Right     []  Left      [] Trunk      [x] LE             [x]  Right     [x]  Left      [] Trunk         Sequence used and effectiveness:  Manual therapy  [] Secondary/Primary sequence for upper extremity with / without trunk involvement    [x] Secondary/Primary sequence for lower extremities with / without trunk involvement: B LE full leg sequence, diaphragmatic breathing pre and post-MLD    [] Modifications were made to manual lymph drainage sequence to exclude cervical techniques secondary to patient's age    [] Self MLD sequence/techniques/hand strokes   Skin/wound care/debridement: Skin intact. Eucerin applied to BLE's in upward strokes. Upper/Lower extremity compression:  Manual therapy  Applied multi-layer compression bandaging to:     Below knee [x]  Thigh high  []   Upper Extremity []    Right [] Left []    Bilateral [x]    The following multi-layer bandages were applied:  [x]  Protouch stockinette           []  Silver Stockinette             []  Tg soft  []  Comperm    []  Transelast     Padding layer:  [x]  Cast padding to bulk out ankles    [x]  Fleece    Foam:  [] 10cm roll  [x] 12cm roll  [] 15cm roll  [] Gray foam  [] Komprex  [] Komprex 2      Short stretch bandages:   [] 4cm  [x] 6cm - noa sandal technique   [x] 8cm  [x] 10cm  [] 12cm    Tubigrip applied at proximal aspect to secure taped areas. Educated patient in multi-layer bandaging donning principles and precautions. Kinesiotaping:    Girth/Volume measurement: Reviewed results of volumetric measurements taken on evaluation. TOTAL TREATMENT 62 mins     Circumferential Limb Measurements:            Affected Side: bilateral   Left (cm) Right (cm)   5th Tuberosity 23.4    22.3      Ankle 24.4    24.7      Calf 38.7    37.9      Mid Knee             Thigh             Groin             Length                 ASSESSMENT:   Treatment effectiveness and tolerance: Pt tolerated MLD BLE and BLE MLB well today. Will plan to recheck volumes next visit and determine readiness to measure for replacement custom stockings. Progress toward goals: ongoing     PLAN OF CARE:   Changes to the plan of care: 1. Assess tolerance to MLD/MLB. 2. Recheck volumes next 1-2 visits. 3. Measure for custom stockings next visit?    Frequency: [x]  2 times a week  []  Weekly  []  Biweekly  []  Monthly   Other:        Rosa Perkins, PT, ANANDA

## 2021-08-04 ENCOUNTER — HOSPITAL ENCOUNTER (OUTPATIENT)
Dept: PHYSICAL THERAPY | Age: 76
Discharge: HOME | End: 2021-08-04
Payer: MEDICARE

## 2021-08-04 PROCEDURE — 97140 MANUAL THERAPY 1/> REGIONS: CPT

## 2021-08-04 NOTE — PROGRESS NOTES
_                                    [facility]  [address]  [address]  Christianne Brand        [facility] THERAPY  Functional outcomes measure 8/4/2021  VISIT: 6    [x]                  Daily note               [x]                 30 day/10th visit progress note    NAME: Kirsty Gibbs  DATE: 8/4/2021    GOALS  Goals:  Short term: 6-8 weeks  1. Instruct the patient to be independent with proper skin care to prevent future skin breakdown and decrease the potential risk for infections that are associated with Lymphedema. 2. Patient will be independent with a personal lymphedema exercise program to assist with the lymphatic flow and reduce limb volume bilateral LE by 500-600 mL. 8/4/2021 R LE reduced by 1020.14 mL; L LE reduced by 1836.56 mL. Goal met. 3. Patient will understand the signs and symptoms of acute infection. Long term: 8-12 weeks  1. Patient will have knowledge of the compression options and acquire a safe and appropriate daytime and night time compression system to prevent re-accumulation of fluid. 8/4/2021 completed measurements for custom flat knit knee high stockings, CCL 2.  2.  Patient or family will be able to don/doff garments independently. Garment system effectiveness will be evaluated prior to discharge for better long-term management and outcomes. 3. Patient to report 5% improvement in functional outcomes measure to allow reduced complaints of pain/tightness bilateral LE. 4.   To transition patient to independent restorative phase of CDT.          SUBJECTIVE REPORT: Patient arrived to appt with MLB intact B LEs, reports good tolerance after last visit. Pt agreeable to proceeding with treatment.    Pain: 0/10                                Gait:  Modified indep using SPC  []  Independent gait without any assistive device for community distances  ADLs:  indep  Treatment Response:    [x]   Patient reviewed packet received at evaluation  [x]   Patient completed home program as prescribed  []   Patient not fully compliant with home program to date  []   Patient waiting on compression garment arrival  Function: limited by chronic LE swelling   []   Patient requiring less assistance with completion of home program  []   ADLs are requiring less assistance   []   Patient able to return to work/leisure activities    Lymphedema Life Impact Scale: 23/68; 31% impairment. Weight: NT  TREATMENT AND OBJECTIVE DATA SUMMARY:   Patient/Family Education:      Educated in skin care: [x]   Skin care products  [x]   Hygiene  [x]  Prevention of cellulitis  []   Wound care     Educated in exercise: []   Walking program  []   Rohini ball routine  []   Stick routine  []   ROM routine     Instructed in self MLD:   Written sequence given and reviewed with patient as well as demonstration and instruction during MLD portion of the session. Instructed in don/doff of compression system:   [x]   Multi layer bandage (MLB) donning principles and wear precautions  []   Day garments  []   Night garments    Compression bandaging instructions:  1. Compression bandaging will be applied twice a week by therapist in clinic, with adjustments to be made at home as indicated if bandaging becomes loose or uncomfortable. 2. If tolerated, remain bandaged between appointments with therapist, removing under the following conditionsDO NOT WAIT FOR A RETURN PHONE [address]:  -Numbness/tingling in extremity different from what you have experienced without the bandages in place.  -Compromise in circulation, monitoring blood refill into toes after applying gentle pressure to toes.  -Onset of pain in extremity that is sudden and severe in nature. -Redness, warmth in limb, and/or fever, flu-like symptoms, which may indicate infection. If this occurs, call your doctor right away.   If you note a sudden increase in swelling in extremity, with or without redness/warmth, go to the emergency room as soon as possible to have blood clot ruled out. 3. Compression bandaging supplies that can be laundered are the brown compression wraps and any foam applied for padding. Launder in washer/dryer with NO fabric softener, bleach, woolite. Dry on a low heat. 4. Once compression garments are ordered, compression bandaging will be continued until garments arrive for fitting. This process can take several weeks. Therapeutic Exercise/Procedure  minutes   Treatment time:   Stick exercise program: N/A   Free exercises/ROM: N/A   Home program: Patient to perform daily to BID:  [x]   Skin care  [x]   Deep abdominal breathing  [x]   Exercise routine  [x]   Walking program  [x]   Rest in supine   [x]   Compression bandage  []   Compression garments  []   Vasopneumatic device  []   Wound care  [x]   Self MLD  [x]   Bring supplies to each therapy visit  [x]   Purchase necessary supplies  []   Weight loss program  []   Follow up with       Rationale: Exercise will increase the lymph angiomotoricity and tissue pressure of the skin and thus decrease swelling. Modalities  minutes   Treatment time:   Vasopneumatic pump:      Manual Therapy:  Treatment time: 11:26 am-12:28 pm 62 minutes   :  Area to decongest:    [] UE          []  Right     []  Left      [] Trunk      [x] LE             [x]  Right     [x]  Left      [] Trunk         Sequence used and effectiveness:  Manual therapy  [] Secondary/Primary sequence for upper extremity with / without trunk involvement    [x] Secondary/Primary sequence for lower extremities with / without trunk involvement: B LE thigh/knee region. [] Modifications were made to manual lymph drainage sequence to exclude cervical techniques secondary to patient's age    [x] Self MLD sequence/techniques/hand strokes   Skin/wound care/debridement: Skin intact. Eucerin/anti-itch lotion applied to BLE's in upward strokes.    Upper/Lower extremity compression:   Applied multi-layer compression bandaging to: Below knee [x]  Thigh high  []   Upper Extremity []    Right []   Left []    Bilateral [x]    The following multi-layer bandages were applied:  [x]  Protouch stockinette           []  Silver Stockinette             []  Tg soft  []  Comperm    []  Transelast     Padding layer:  [x]  Cast padding to bulk out ankles    [x]  Fleece    Foam:  [] 10cm roll  [x] 12cm roll  [] 15cm roll  [] Gray foam  [] Komprex  [] Komprex 2      Short stretch bandages:   [] 4cm  [x] 6cm - noa sandal technique   [x] 8cm  [x] 10cm  [] 12cm    Tubigrip applied at proximal aspect to secure taped areas. Educated patient in multi-layer bandaging donning principles and precautions. Compression garments:  Completed measurements for [employer] 3022SV knee high compression stockings. Patient advised addressing knee/thigh region bilateral would be beneficial.  Patient prefers to defer at this time. Patient advised knee high compression stockings can be fit, with patient returning to daily use of vasopneumatic pump, with knee/thigh involvement to be assessed at follow up appointments. Patient advised addition of thigh sleeve paired with knee high stockings an option as indicated. Patient in agreement with plan. Girth/Volume measurement: Limb volume measurements completed with results as follows:                                   Right                     Left  8/4/2021              11,156.02 mL       11,340. 41 mL   7/1/2021              12,176.16 mL       13,176.97 mL    Limb volume discrepancy reduced from 8.22% to 1.65% today. TOTAL TREATMENT 62 mins     ASSESSMENT:   Treatment effectiveness and tolerance: Pt tolerated MLD BLE and BLE MLB well today. Limb volume measurement improvement as noted above. Completed measurements for custom flat knit knee high compression stockings, with plan to consider thigh high management of lymphedema.    Progress toward goals: ongoing PLAN OF CARE:   Changes to the plan of care: 1. Assess tolerance to MLD/MLB. 2. Fit compression garments upon receiving. 3. Progress HEP. 4. Transition to phase II CDT.    Frequency: [x]  2 times a week  []  Weekly  []  Biweekly  []  Monthly   Other:        Melany Land PT, ANANDA

## 2021-08-09 ENCOUNTER — HOSPITAL ENCOUNTER (OUTPATIENT)
Dept: PHYSICAL THERAPY | Age: 76
Discharge: HOME | End: 2021-08-09
Payer: MEDICARE

## 2021-08-09 PROCEDURE — 97140 MANUAL THERAPY 1/> REGIONS: CPT

## 2021-08-09 NOTE — PROGRESS NOTES
_                                    [facility]  [address]  [address]        [facility] THERAPY  Functional outcomes measure 8/4/2021  VISIT: 7    [x]                  Daily note               [x]                 30 day/10th visit progress note    NAME: Don Mujica  DATE: 8/9/2021    GOALS  Goals:  Short term: 6-8 weeks  1. Instruct the patient to be independent with proper skin care to prevent future skin breakdown and decrease the potential risk for infections that are associated with Lymphedema. 2. Patient will be independent with a personal lymphedema exercise program to assist with the lymphatic flow and reduce limb volume bilateral LE by 500-600 mL. 8/4/2021 R LE reduced by 1020.14 mL; L LE reduced by 1836.56 mL. Goal met. 3. Patient will understand the signs and symptoms of acute infection. Long term: 8-12 weeks  1. Patient will have knowledge of the compression options and acquire a safe and appropriate daytime and night time compression system to prevent re-accumulation of fluid. 8/4/2021 completed measurements for custom flat knit knee high stockings, CCL 2.  2.  Patient or family will be able to don/doff garments independently. Garment system effectiveness will be evaluated prior to discharge for better long-term management and outcomes. 3. Patient to report 5% improvement in functional outcomes measure to allow reduced complaints of pain/tightness bilateral LE. 4.   To transition patient to independent restorative phase of CDT.          SUBJECTIVE REPORT: Patient arrived to appt with MLB intact B LEs, reports good tolerance after last visit. Pt agreeable to proceeding with treatment.  States she did make payment for custom flat knit knee high stockings, order placed with [facility].   Pain: 0/10                                Gait:  Modified indep using SPC  []  Independent gait without any assistive device for community distances  ADLs:  indep  Treatment Response:    [x]   Patient reviewed packet received at evaluation  [x]   Patient completed home program as prescribed  []   Patient not fully compliant with home program to date  []   Patient waiting on compression garment arrival  Function: limited by chronic LE swelling   []   Patient requiring less assistance with completion of home program  []   ADLs are requiring less assistance   []   Patient able to return to work/leisure activities    Lymphedema Life Impact Scale: 23/68; 31% impairment 8/4/2021  Weight: NT  TREATMENT AND OBJECTIVE DATA SUMMARY:   Patient/Family Education:      Educated in skin care: [x]   Skin care products  [x]   Hygiene  [x]  Prevention of cellulitis  []   Wound care     Educated in exercise: []   Walking program  []   Rohini ball routine  []   Stick routine  []   ROM routine     Instructed in self MLD:   Written sequence given and reviewed with patient as well as demonstration and instruction during MLD portion of the session. Instructed in don/doff of compression system:   [x]   Multi layer bandage (MLB) donning principles and wear precautions  []   Day garments  []   Night garments    Compression bandaging instructions:  1. Compression bandaging will be applied twice a week by therapist in clinic, with adjustments to be made at home as indicated if bandaging becomes loose or uncomfortable. 2. If tolerated, remain bandaged between appointments with therapist, removing under the following conditionsDO NOT WAIT FOR A RETURN PHONE [address]:  -Numbness/tingling in extremity different from what you have experienced without the bandages in place.  -Compromise in circulation, monitoring blood refill into toes after applying gentle pressure to toes.  -Onset of pain in extremity that is sudden and severe in nature. -Redness, warmth in limb, and/or fever, flu-like symptoms, which may indicate infection.   If this occurs, call your doctor right away. If you note a sudden increase in swelling in extremity, with or without redness/warmth, go to the emergency room as soon as possible to have blood clot ruled out. 3. Compression bandaging supplies that can be laundered are the brown compression wraps and any foam applied for padding. Launder in washer/dryer with NO fabric softener, bleach, woolite. Dry on a low heat. 4. Once compression garments are ordered, compression bandaging will be continued until garments arrive for fitting. This process can take several weeks. Therapeutic Exercise/Procedure  minutes   Treatment time:   Stick exercise program: N/A   Free exercises/ROM: N/A   Home program: Patient to perform daily to BID:  [x]   Skin care  [x]   Deep abdominal breathing  [x]   Exercise routine  [x]   Walking program  [x]   Rest in supine   [x]   Compression bandage  []   Compression garments  []   Vasopneumatic device  []   Wound care  [x]   Self MLD  [x]   Bring supplies to each therapy visit  [x]   Purchase necessary supplies  []   Weight loss program  []   Follow up with       Rationale: Exercise will increase the lymph angiomotoricity and tissue pressure of the skin and thus decrease swelling. Modalities  minutes   Treatment time:   Vasopneumatic pump:      Manual Therapy:  Treatment time: 11:10 am-12:13 pm 63 minutes   :  Area to decongest:    [] UE          []  Right     []  Left      [] Trunk      [x] LE             [x]  Right     [x]  Left      [] Trunk         Sequence used and effectiveness:  Manual therapy  [] Secondary/Primary sequence for upper extremity with / without trunk involvement    [x] Secondary/Primary sequence for lower extremities with / without trunk involvement: B LE thigh/knee region.     [] Modifications were made to manual lymph drainage sequence to exclude cervical techniques secondary to patient's age    [x] Self MLD sequence/techniques/hand strokes   Skin/wound care/debridement: Skin intact. Eucerin/anti-itch lotion applied to BLE's in upward strokes. Upper/Lower extremity compression:   Applied multi-layer compression bandaging to: Below knee [x]  Thigh high  []   Upper Extremity []    Right []   Left []    Bilateral [x]    The following multi-layer bandages were applied:  [x]  Protouch stockinette           []  Silver Stockinette             []  Tg soft  []  Comperm    []  Transelast     Padding layer:  [x]  Cast padding to bulk out ankles    [x]  Fleece    Foam:  [] 10cm roll  [x] 12cm roll  [] 15cm roll  [] Gray foam  [] Komprex  [] Komprex 2      Short stretch bandages:   [] 4cm  [x] 6cm - noa sandal technique   [x] 8cm  [x] 10cm  [] 12cm    Tubigrip applied at proximal aspect to secure taped areas. Educated patient in multi-layer bandaging donning principles and precautions. Compression garments:  Previous visit 8/4/2021: Completed measurements for [facility] 3022SV knee high compression stockings. Patient advised addressing knee/thigh region bilateral would be beneficial.  Patient prefers to defer at this time. Patient advised knee high compression stockings can be fit, with patient returning to daily use of vasopneumatic pump, with knee/thigh involvement to be assessed at follow up appointments. Patient advised addition of thigh sleeve paired with knee high stockings an option as indicated. Patient in agreement with plan. Girth/Volume measurement: Limb volume measurements completed with results as follows:                                   Right                     Left  8/4/2021              11,156.02 mL       11,340. 41 mL   7/1/2021              12,176.16 mL       13,176.97 mL    Limb volume discrepancy reduced from 8.22% to 1.65% today. TOTAL TREATMENT 63 mins     ASSESSMENT:   Treatment effectiveness and tolerance: Pt tolerated MLD BLE and BLE MLB well today. Limb volume measurement improvement as noted above.   Completed measurements for custom flat knit knee high compression stockings previous visit, with order sent to . Patient reports payment made for knee high CCL 2 stockings. Continue to discuss plan to consider thigh high management of lymphedema. Progress toward goals: ongoing     PLAN OF CARE:   Changes to the plan of care: 1. Assess tolerance to MLD/MLB. 2. Fit compression garments upon receiving. 3. Progress HEP. 4. Transition to phase II CDT.    Frequency: [x]  2 times a week  []  Weekly  []  Biweekly  []  Monthly   Other:        Estela Esquivel, PT, [place]

## 2021-08-11 ENCOUNTER — HOSPITAL ENCOUNTER (OUTPATIENT)
Dept: PHYSICAL THERAPY | Age: 76
Discharge: HOME | End: 2021-08-11
Payer: MEDICARE

## 2021-08-11 PROCEDURE — 97140 MANUAL THERAPY 1/> REGIONS: CPT

## 2021-08-11 NOTE — PROGRESS NOTES
_                                    [facility]  [address]  [address]  [place]        THE [facility] THERAPY  Functional outcomes measure 8/4/2021  VISIT: 8    [x]                  Daily note               [x]                 30 day/10th visit progress note    NAME: Nora Canchola  DATE: 8/11/2021    GOALS  Goals:  Short term: 6-8 weeks  1. Instruct the patient to be independent with proper skin care to prevent future skin breakdown and decrease the potential risk for infections that are associated with Lymphedema. 2. Patient will be independent with a personal lymphedema exercise program to assist with the lymphatic flow and reduce limb volume bilateral LE by 500-600 mL. 8/4/2021 R LE reduced by 1020.14 mL; L LE reduced by 1836.56 mL. Goal met. 3. Patient will understand the signs and symptoms of acute infection. Long term: 8-12 weeks  1. Patient will have knowledge of the compression options and acquire a safe and appropriate daytime and night time compression system to prevent re-accumulation of fluid. 8/4/2021 completed measurements for custom flat knit knee high stockings, CCL 2.  2.  Patient or family will be able to don/doff garments independently. Garment system effectiveness will be evaluated prior to discharge for better long-term management and outcomes. 3. Patient to report 5% improvement in functional outcomes measure to allow reduced complaints of pain/tightness bilateral LE. 4.   To transition patient to independent restorative phase of CDT.          SUBJECTIVE REPORT: Patient arrived to appt with MLB intact B LEs, reports good tolerance after last visit. Pt agreeable to proceeding with treatment.  Awaiting compression stocking arrival.   Pain: 0/10                                Gait:  Modified indep using SPC  []  Independent gait without any assistive device for community distances  ADLs: indep  Treatment Response:    [x]   Patient reviewed packet received at evaluation  [x]   Patient completed home program as prescribed  []   Patient not fully compliant with home program to date  []   Patient waiting on compression garment arrival  Function: limited by chronic LE swelling   []   Patient requiring less assistance with completion of home program  []   ADLs are requiring less assistance   []   Patient able to return to work/leisure activities    Lymphedema Life Impact Scale: 23/68; 31% impairment 8/4/2021  Weight: NT  TREATMENT AND OBJECTIVE DATA SUMMARY:   Patient/Family Education:      Educated in skin care: [x]   Skin care products  [x]   Hygiene  [x]  Prevention of cellulitis  []   Wound care     Educated in exercise: []   Walking program  []   Rohini ball routine  []   Stick routine  []   ROM routine     Instructed in self MLD:   Written sequence given and reviewed with patient as well as demonstration and instruction during MLD portion of the session. Instructed in don/doff of compression system:   [x]   Multi layer bandage (MLB) donning principles and wear precautions  []   Day garments  []   Night garments    Compression bandaging instructions:  1. Compression bandaging will be applied twice a week by therapist in clinic, with adjustments to be made at home as indicated if bandaging becomes loose or uncomfortable. 2. If tolerated, remain bandaged between appointments with therapist, removing under the following conditionsDO NOT WAIT FOR A RETURN PHONE [address]:  -Numbness/tingling in extremity different from what you have experienced without the bandages in place.  -Compromise in circulation, monitoring blood refill into toes after applying gentle pressure to toes.  -Onset of pain in extremity that is sudden and severe in nature. -Redness, warmth in limb, and/or fever, flu-like symptoms, which may indicate infection. If this occurs, call your doctor right away.   If you note a sudden increase in swelling in extremity, with or without redness/warmth, go to the emergency room as soon as possible to have blood clot ruled out. 3. Compression bandaging supplies that can be laundered are the brown compression wraps and any foam applied for padding. Launder in washer/dryer with NO fabric softener, bleach, woolite. Dry on a low heat. 4. Once compression garments are ordered, compression bandaging will be continued until garments arrive for fitting. This process can take several weeks. Therapeutic Exercise/Procedure  minutes   Treatment time:   Stick exercise program: N/A   Free exercises/ROM: N/A   Home program: Patient to perform daily to BID:  [x]   Skin care  [x]   Deep abdominal breathing  [x]   Exercise routine  [x]   Walking program  [x]   Rest in supine   [x]   Compression bandage  []   Compression garments  []   Vasopneumatic device  []   Wound care  [x]   Self MLD  [x]   Bring supplies to each therapy visit  [x]   Purchase necessary supplies  []   Weight loss program  []   Follow up with       Rationale: Exercise will increase the lymph angiomotoricity and tissue pressure of the skin and thus decrease swelling. Modalities  minutes   Treatment time:   Vasopneumatic pump:      Manual Therapy:  Treatment time: 11:12 am-12:02 pm 50 minutes   :  Area to decongest:    [] UE          []  Right     []  Left      [] Trunk      [x] LE             [x]  Right     [x]  Left      [] Trunk         Sequence used and effectiveness:  Manual therapy  [] Secondary/Primary sequence for upper extremity with / without trunk involvement    [x] Secondary/Primary sequence for lower extremities with / without trunk involvement: Bilateral LE full leg sequence. [] Modifications were made to manual lymph drainage sequence to exclude cervical techniques secondary to patient's age    [x] Self MLD sequence/techniques/hand strokes   Skin/wound care/debridement: Skin intact.  Eucerin/anti-itch lotion applied to BLE's in upward strokes. Upper/Lower extremity compression:   Applied multi-layer compression bandaging to: Below knee [x]  Thigh high  []   Upper Extremity []    Right []   Left []    Bilateral [x]    The following multi-layer bandages were applied:  [x]  Protouch stockinette           []  Silver Stockinette             []  Tg soft  []  Comperm    []  Transelast     Padding layer:  [x]  Cast padding to bulk out ankles    [x]  Fleece    Foam:  [] 10cm roll  [x] 12cm roll  [] 15cm roll  [] Gray foam  [] Komprex  [x] Komprex 2: posterior calf bilateral      Short stretch bandages:   [] 4cm  [x] 6cm - noa sandal technique   [x] 8cm  [x] 10cm  [] 12cm    Tubigrip applied at proximal aspect to secure taped areas. Educated patient in multi-layer bandaging donning principles and precautions. Compression garments:  Previous visit 8/4/2021: Completed measurements for Juzo 3022SV knee high compression stockings. Patient advised addressing knee/thigh region bilateral would be beneficial.  Patient prefers to defer at this time. Patient advised knee high compression stockings can be fit, with patient returning to daily use of vasopneumatic pump, with knee/thigh involvement to be assessed at follow up appointments. Patient advised addition of thigh sleeve paired with knee high stockings an option as indicated. Patient in agreement with plan. Girth/Volume measurement: Limb volume measurements completed with results as follows:                                   Right                     Left  8/4/2021              11,156.02 mL       11,340. 41 mL   7/1/2021              12,176.16 mL       13,176.97 mL    Limb volume discrepancy reduced from 8.22% to 1.65% today. TOTAL TREATMENT 50 mins     ASSESSMENT:   Treatment effectiveness and tolerance: Pt tolerated MLD BLE and BLE MLB well today. Limb volume measurement improvement as noted above.   Completed measurements for custom flat knit knee high compression stockings previous visit, with order sent to . Patient reports payment made for knee high CCL 2 stockings. Continue to discuss plan to consider thigh high management of lymphedema. Progress toward goals: ongoing     PLAN OF CARE:   Changes to the plan of care: 1. Assess tolerance to MLD/MLB. 2. Fit compression garments upon receiving. 3. Progress HEP. 4. Transition to phase II CDT.    Frequency: [x]  2 times a week  []  Weekly  []  Biweekly  []  Monthly   Other:        Rivera Coats PT, ANANDA

## 2021-08-16 ENCOUNTER — HOSPITAL ENCOUNTER (OUTPATIENT)
Dept: PHYSICAL THERAPY | Age: 76
Discharge: HOME | End: 2021-08-16
Payer: MEDICARE

## 2021-08-16 PROCEDURE — 97140 MANUAL THERAPY 1/> REGIONS: CPT

## 2021-08-16 NOTE — PROGRESS NOTES
_                                    [facility] [address] [facility]  [address]  [address]  [place]        THE [facility] THERAPY  Functional outcomes measure 8/4/2021  VISIT: 9    [x]                  Daily note               [x]                 30 day/10th visit progress note    NAME: Ruba Pineda  DATE: 8/16/2021    GOALS  Goals:  Short term: 6-8 weeks  1. Instruct the patient to be independent with proper skin care to prevent future skin breakdown and decrease the potential risk for infections that are associated with Lymphedema. 2. Patient will be independent with a personal lymphedema exercise program to assist with the lymphatic flow and reduce limb volume bilateral LE by 500-600 mL. 8/4/2021 R LE reduced by 1020.14 mL; L LE reduced by 1836.56 mL. Goal met. 3. Patient will understand the signs and symptoms of acute infection. Long term: 8-12 weeks  1. Patient will have knowledge of the compression options and acquire a safe and appropriate daytime and night time compression system to prevent re-accumulation of fluid. 8/4/2021 completed measurements for custom flat knit knee high stockings, CCL 2.  2.  Patient or family will be able to don/doff garments independently. Garment system effectiveness will be evaluated prior to discharge for better long-term management and outcomes. 3. Patient to report 5% improvement in functional outcomes measure to allow reduced complaints of pain/tightness bilateral LE. 4.   To transition patient to independent restorative phase of CDT.          SUBJECTIVE REPORT: Patient arrived to appt with MLB intact B LEs, reports good tolerance after last visit. Pt agreeable to proceeding with treatment.  Awaiting compression stocking arrival.   Pain: 0/10                                Gait:  Modified indep using SPC  []  Independent gait without any assistive device for community distances  ADLs: indep  Treatment Response:    [x]   Patient reviewed packet received at evaluation  [x]   Patient completed home program as prescribed  []   Patient not fully compliant with home program to date  []   Patient waiting on compression garment arrival  Function: limited by chronic LE swelling   []   Patient requiring less assistance with completion of home program  []   ADLs are requiring less assistance   []   Patient able to return to work/leisure activities    Lymphedema Life Impact Scale: 23/68; 31% impairment 8/4/2021  Weight: NT  TREATMENT AND OBJECTIVE DATA SUMMARY:   Patient/Family Education:      Educated in skin care: [x]   Skin care products  [x]   Hygiene  [x]  Prevention of cellulitis  []   Wound care     Educated in exercise: []   Walking program  []   Rohini ball routine  []   Stick routine  []   ROM routine     Instructed in self MLD:   Written sequence given and reviewed with patient as well as demonstration and instruction during MLD portion of the session. Instructed in don/doff of compression system:   [x]   Multi layer bandage (MLB) donning principles and wear precautions  []   Day garments  []   Night garments    Compression bandaging instructions:  1. Compression bandaging will be applied twice a week by therapist in clinic, with adjustments to be made at home as indicated if bandaging becomes loose or uncomfortable. 2. If tolerated, remain bandaged between appointments with therapist, removing under the following conditionsDO NOT WAIT FOR A RETURN PHONE [address]:  -Numbness/tingling in extremity different from what you have experienced without the bandages in place.  -Compromise in circulation, monitoring blood refill into toes after applying gentle pressure to toes.  -Onset of pain in extremity that is sudden and severe in nature. -Redness, warmth in limb, and/or fever, flu-like symptoms, which may indicate infection. If this occurs, call your doctor right away.   If you note a sudden increase in swelling in extremity, with or without redness/warmth, go to the emergency room as soon as possible to have blood clot ruled out. 3. Compression bandaging supplies that can be laundered are the brown compression wraps and any foam applied for padding. Launder in washer/dryer with NO fabric softener, bleach, woolite. Dry on a low heat. 4. Once compression garments are ordered, compression bandaging will be continued until garments arrive for fitting. This process can take several weeks. Therapeutic Exercise/Procedure  minutes   Treatment time:   Stick exercise program: N/A   Free exercises/ROM: N/A   Home program: Patient to perform daily to BID:  [x]   Skin care  [x]   Deep abdominal breathing  [x]   Exercise routine  [x]   Walking program  [x]   Rest in supine   [x]   Compression bandage  []   Compression garments  []   Vasopneumatic device  []   Wound care  [x]   Self MLD  [x]   Bring supplies to each therapy visit  [x]   Purchase necessary supplies  []   Weight loss program  []   Follow up with       Rationale: Exercise will increase the lymph angiomotoricity and tissue pressure of the skin and thus decrease swelling. Modalities  minutes   Treatment time:   Vasopneumatic pump:      Manual Therapy:  Treatment time: 11:13 am-12:14 pm 61 minutes   :  Area to decongest:    [] UE          []  Right     []  Left      [] Trunk      [x] LE             [x]  Right     [x]  Left      [] Trunk         Sequence used and effectiveness:  Manual therapy  [] Secondary/Primary sequence for upper extremity with / without trunk involvement    [x] Secondary/Primary sequence for lower extremities with / without trunk involvement: Bilateral LE full leg sequence. [] Modifications were made to manual lymph drainage sequence to exclude cervical techniques secondary to patient's age    [x] Self MLD sequence/techniques/hand strokes   Skin/wound care/debridement: Skin intact.  Eucerin/anti-itch lotion applied to BLE's in upward strokes. Upper/Lower extremity compression:   Applied multi-layer compression bandaging to: Below knee [x]  Thigh high  []   Upper Extremity []    Right []   Left []    Bilateral [x]    The following multi-layer bandages were applied:  [x]  Protouch stockinette           []  Silver Stockinette             []  Tg soft  []  Comperm    []  Transelast     Padding layer:  [x]  Cast padding to bulk out ankles    [x]  Fleece    Foam:  [] 10cm roll  [x] 12cm roll  [] 15cm roll  [] Gray foam  [] Komprex  [x] Komprex 2: posterior calf bilateral      Short stretch bandages:   [] 4cm  [x] 6cm - noa sandal technique   [x] 8cm  [x] 10cm  [] 12cm    Tubigrip applied at proximal aspect to secure taped areas. Educated patient in multi-layer bandaging donning principles and precautions. Compression garments:  Previous visit 8/4/2021: Completed measurements for Juzo 3022SV knee high compression stockings. Patient advised addressing knee/thigh region bilateral would be beneficial.  Patient prefers to defer at this time. Patient advised knee high compression stockings can be fit, with patient returning to daily use of vasopneumatic pump, with knee/thigh involvement to be assessed at follow up appointments. Patient advised addition of thigh sleeve paired with knee high stockings an option as indicated. Patient in agreement with plan. Girth/Volume measurement: Limb volume measurements completed with results as follows:                                   Right                     Left  8/4/2021              11,156.02 mL       11,340. 41 mL   7/1/2021              12,176.16 mL       13,176.97 mL    Limb volume discrepancy reduced from 8.22% to 1.65% today. TOTAL TREATMENT 61 mins     ASSESSMENT:   Treatment effectiveness and tolerance: Pt tolerated MLD BLE and BLE MLB well today. Limb volume measurement improvement as noted above.   Completed measurements for custom flat knit knee high compression stockings previous visit, with order sent to . Patient reports payment made for knee high CCL 2 stockings. Continue to discuss plan to consider thigh high management of lymphedema. Progress toward goals: ongoing     PLAN OF CARE:   Changes to the plan of care: 1. Assess tolerance to MLD/MLB. 2. Fit compression garments upon receiving. 3. Progress HEP. 4. Transition to phase II CDT.    Frequency: [x]  2 times a week  []  Weekly  []  Biweekly  []  Monthly   Other:        Rosa Perkins PT, ANANDA

## 2021-08-18 ENCOUNTER — HOSPITAL ENCOUNTER (OUTPATIENT)
Dept: PHYSICAL THERAPY | Age: 76
Discharge: HOME | End: 2021-08-18
Payer: MEDICARE

## 2021-08-18 PROCEDURE — 97760 ORTHOTIC MGMT&TRAING 1ST ENC: CPT

## 2021-08-18 NOTE — PROGRESS NOTES
_                                    [facility] [address] [facility]  [address]  [address]  [place]        THE [facility] THERAPY  Functional outcomes measure 8/4/2021  VISIT: 10    [x]                  Daily note               [x]                 30 day/10th visit progress note    NAME: Jada Covarrubias  DATE: 8/18/2021    GOALS  Goals:  Short term: 6-8 weeks  1. Instruct the patient to be independent with proper skin care to prevent future skin breakdown and decrease the potential risk for infections that are associated with Lymphedema. 2. Patient will be independent with a personal lymphedema exercise program to assist with the lymphatic flow and reduce limb volume bilateral LE by 500-600 mL. 8/4/2021 R LE reduced by 1020.14 mL; L LE reduced by 1836.56 mL. Goal met. 3. Patient will understand the signs and symptoms of acute infection. Long term: 8-12 weeks  1. Patient will have knowledge of the compression options and acquire a safe and appropriate daytime and night time compression system to prevent re-accumulation of fluid. 8/4/2021 completed measurements for custom flat knit knee high stockings, CCL 2.  2.  Patient or family will be able to don/doff garments independently. Garment system effectiveness will be evaluated prior to discharge for better long-term management and outcomes. 8/18/2021 effective fit of compression stockings. Alteration likely needed to adjust foot length. 3. Patient to report 5% improvement in functional outcomes measure to allow reduced complaints of pain/tightness bilateral LE. 4.   To transition patient to independent restorative phase of CDT.          SUBJECTIVE REPORT: Patient arrived to appt with MLB intact B LEs, reports good tolerance after last visit. Arrives with compression stockings for fit today.      Pain: 0/10                                Gait:  Modified indep using SPC  []  Independent gait without any assistive device for community distances  ADLs:  indep  Treatment Response:    [x]   Patient reviewed packet received at evaluation  [x]   Patient completed home program as prescribed  []   Patient not fully compliant with home program to date  []   Patient waiting on compression garment arrival  Function: limited by chronic LE swelling   []   Patient requiring less assistance with completion of home program  []   ADLs are requiring less assistance   []   Patient able to return to work/leisure activities    Lymphedema Life Impact Scale: 23/68; 31% impairment 8/4/2021  Weight: NT  TREATMENT AND OBJECTIVE DATA SUMMARY:   Patient/Family Education:      Educated in skin care: [x]   Skin care products  [x]   Hygiene  [x]  Prevention of cellulitis  []   Wound care     Educated in exercise: []   Walking program  []   Rohini ball routine  []   Stick routine  []   ROM routine     Instructed in self MLD:   Written sequence given and reviewed with patient as well as demonstration and instruction during MLD portion of the session. Instructed in don/doff of compression system:   [x]   Multi layer bandage (MLB) donning principles and wear precautions  []   Day garments  []   Night garments    Compression bandaging instructions:  1. Compression bandaging will be applied twice a week by therapist in clinic, with adjustments to be made at home as indicated if bandaging becomes loose or uncomfortable. 2. If tolerated, remain bandaged between appointments with therapist, removing under the following conditionsDO NOT WAIT FOR A RETURN PHONE [address]:  -Numbness/tingling in extremity different from what you have experienced without the bandages in place.  -Compromise in circulation, monitoring blood refill into toes after applying gentle pressure to toes.  -Onset of pain in extremity that is sudden and severe in nature. -Redness, warmth in limb, and/or fever, flu-like symptoms, which may indicate infection. If this occurs, call your doctor right away. If you note a sudden increase in swelling in extremity, with or without redness/warmth, go to the emergency room as soon as possible to have blood clot ruled out. 3. Compression bandaging supplies that can be laundered are the brown compression wraps and any foam applied for padding. Launder in washer/dryer with NO fabric softener, bleach, woolite. Dry on a low heat. 4. Once compression garments are ordered, compression bandaging will be continued until garments arrive for fitting. This process can take several weeks. Therapeutic Exercise/Procedure  minutes   Treatment time:   Stick exercise program: N/A   Free exercises/ROM: N/A   Home program: Patient to perform daily to BID:  [x]   Skin care  [x]   Deep abdominal breathing  [x]   Exercise routine  [x]   Walking program  [x]   Rest in supine   [x]   Compression bandage  []   Compression garments  []   Vasopneumatic device  []   Wound care  [x]   Self MLD  [x]   Bring supplies to each therapy visit  [x]   Purchase necessary supplies  []   Weight loss program  []   Follow up with       Rationale: Exercise will increase the lymph angiomotoricity and tissue pressure of the skin and thus decrease swelling. Modalities  minutes   Treatment time:   Vasopneumatic pump:      Orthotic management: 11:08-11:46 am 38 minutes   :  Area to decongest:    [] UE          []  Right     []  Left      [] Trunk      [x] LE             [x]  Right     [x]  Left      [] Trunk         Sequence used and effectiveness:  Deferred [] Secondary/Primary sequence for upper extremity with / without trunk involvement    [] Secondary/Primary sequence for lower extremities with / without trunk involvement: Bilateral LE full leg sequence.     [] Modifications were made to manual lymph drainage sequence to exclude cervical techniques secondary to patient's age    [x] Self MLD sequence/techniques/hand strokes   Skin/wound care/debridement: Skin intact. Eucerin/anti-itch lotion applied to BLE's in upward strokes. Upper/Lower extremity compression:   Lower Extremity Compression: Fitted for the following compression products:    foot ankle calf bilateral  lower extremity    Style: Flat-knit    Brand: Samantha    Type: Custom: [id number]    Vendor: [employer]    Education: Educated patient in compression garment donning and doffing. Glove use with donning. Daily wear schedule. Daily laundering. Garment lifespan. Return and reordering process (by bringing prior garments into the clinic). Educated patient to monitor for redness or pressure points on the skin. If new pain occurs they should contact their therapist.    Patient/family demonstrated donning and doffing with independence    Note toe cap long, with photos sent to  to consider remake. Garments do fit effectively, with patient to wear daily from first thing in the morning until bedtime. Patient advised to implement daily vasoneumatic pump use as tolerated. Patient to follow home management of lymphedema as follows:  Compression garment routine:  5. Apply daytime compression stocking to clean, dry extremity first thing in the morning, EVERY MORNING. 6. Wear compression garments until bedtime, adjusting throughout the day as needed should garment wrinkle or crease. Problem areas can be at joints, and top of garment, ensuring proximal aspect of garment positioned appropriately on extremity. 7. Launder garment nightly either by hand or washing machine in a garment bag or pillowcase. Use mild detergent with NO FABRIC SOFTENER, NO BLEACH, NO WOOLITE. Wash in cool/warm water. 8. Dry garment in dryer on low heat right side out in garment bag or pillowcase. 9. Perform skin care to extremity, cleansing skin daily with soap and water, and using low pH lotion, upward strokes to stimulate lymphatic vessels.    10. Daytime compression grments are NOT safe to sleep in, so remove at bedtime. 11. Perform exercise program with compression garments on. Signs/Symptoms of infection include:  Fever, flu-like symptoms, pain/redness/warmthin extremity, sometimes with increase in swelling present. Stop wearing your compression garment if this occurs. Call your doctor immediately or go to the Emergency room to address potential infection. Remove compression with changes in circulation, numbness in extremity different from what you may experience when not wearing compression garment, pain, unexplained shortness of breath. Notify clinic if swelling increase noted prior to next scheduled appt. Night time compression may be needed for optimal management of lymphedema. Return in 4 weeks for follow up appt. Girth/Volume measurement: Limb volume measurements completed with results as follows:                                   Right                     Left  8/4/2021              11,156.02 mL       11,340. 41 mL   7/1/2021              12,176.16 mL       13,176.97 mL    Limb volume discrepancy reduced from 8.22% to 1.65% today. TOTAL TREATMENT 38 mins     ASSESSMENT:   Treatment effectiveness and tolerance: Note good fit of custom flat knit compression stockings, with toe cap length long. Photos sent to  regarding need to remake stockings for appropriate foot length. Patient agreeable to wearing compression stockings until alterations made, due to fit effectiveness and comfort noted today. Progress toward goals: ongoing     PLAN OF CARE:   Changes to the plan of care: 1. Assess tolerance to wear of compression stockings. 2. Assess effectiveness of compression stockings. 3. Transition to phase II CDT.    Frequency: []  2 times a week  []  Weekly  [x]  Biweekly  [x]  Monthly   Other:        Macarena Briones PT, ANANDA

## 2021-08-23 ENCOUNTER — APPOINTMENT (OUTPATIENT)
Dept: PHYSICAL THERAPY | Age: 76
End: 2021-08-23
Payer: MEDICARE

## 2021-08-25 ENCOUNTER — APPOINTMENT (OUTPATIENT)
Dept: PHYSICAL THERAPY | Age: 76
End: 2021-08-25
Payer: MEDICARE

## 2021-08-30 ENCOUNTER — APPOINTMENT (OUTPATIENT)
Dept: PHYSICAL THERAPY | Age: 76
End: 2021-08-30
Payer: MEDICARE

## 2021-09-01 ENCOUNTER — APPOINTMENT (OUTPATIENT)
Dept: PHYSICAL THERAPY | Age: 76
End: 2021-09-01

## 2021-09-08 ENCOUNTER — APPOINTMENT (OUTPATIENT)
Dept: PHYSICAL THERAPY | Age: 76
End: 2021-09-08

## 2021-09-10 ENCOUNTER — APPOINTMENT (OUTPATIENT)
Dept: PHYSICAL THERAPY | Age: 76
End: 2021-09-10

## 2021-09-22 ENCOUNTER — APPOINTMENT (OUTPATIENT)
Dept: PHYSICAL THERAPY | Age: 76
End: 2021-09-22

## 2021-10-06 PROBLEM — H35.3290 EXUDATIVE AGE-RELATED MACULAR DEGENERATION (HCC): Status: ACTIVE | Noted: 2021-10-06

## 2021-12-10 ENCOUNTER — APPOINTMENT (OUTPATIENT)
Dept: PHYSICAL THERAPY | Age: 76
End: 2021-12-10
Payer: MEDICARE

## 2021-12-15 ENCOUNTER — HOSPITAL ENCOUNTER (OUTPATIENT)
Dept: PHYSICAL THERAPY | Age: 76
Discharge: HOME | End: 2021-12-15
Payer: MEDICARE

## 2021-12-15 PROCEDURE — 97763 ORTHC/PROSTC MGMT SBSQ ENC: CPT

## 2021-12-15 PROCEDURE — 97164 PT RE-EVAL EST PLAN CARE: CPT

## 2021-12-15 PROCEDURE — 97530 THERAPEUTIC ACTIVITIES: CPT

## 2021-12-15 PROCEDURE — 97761 PROSTHETIC TRAING 1ST ENC: CPT

## 2021-12-15 NOTE — PROGRESS NOTES
[facility] [address] [facility]  [address] [facility]  [address]  [place] [address] [address]       EVALUATION    NAME: Tiffany Arredondo AGE: 64 y.o. GENDER: female  DATE: 12/15/2021  REFERRING PHYSICIAN: Bety Estrada MD  HISTORY AND BACKGROUND:  Patient is a 65 y/o female with history LE lymphedema for greater than 12 years, returning here for fit of altered custom flat knit compression stockings. Lymphedema history as follows:    Patient received treatment at [facility] prior to TKR in 2008, having undergone phase I CDT. Patient has continued with wear of custom flat knit knee high compression stockings, having them replaced via fitter at [facility], with most recent pair purchased 1-2 years ago. Note garments covering only half of lower legs, with fit likely impaired by recent progression of lymphedema. Oral diuretic has been increased to address increase in limb size, with patient reporting taking 60 mg Furosemide 2x/day. States legs have reduced in size, however, physicians nor patient want high dose of diuretic to continue. Patient was seen at this clinic 2/28/2018-6/1/2018, for phase I CDT, fit with custom flat knit compression stockings and transitioned to home management of lymphedema, with patient last seen 6/25/2019 at this clinic due to patient limiting appointments due to Covid-19.    Primary Diagnosis:  · Primary lymphedema (Q82.0)  Other Treatment Diagnoses:   Abnormality of gait (other abnormalities of gait and mobility R26.89)   Swelling not relieved by elevation ( R60.9 edema)   Venous insufficiency I87.2;    L LE DVT post TKE 2008 (L03.114)   PE (I26.9) 2008  Morbid Obesity (E66.01)  Date of Onset: prior to 2008  Present Symptoms and Functional Limitations: Bilateral LE lymphedema managed with daily wear of custom flat knit compression stockings, however, stockings have not been replaced since 2019. Patient reports requiring assistance to doff compression stockings. Reports increase skin tightness/limb heaviness with lymphedema, reduced strength bilateral LE, wearing knee high stockings only at this time, per patient preference. Gait deficits noted, patient using straight cane for gait, transfers/bed mobility require additional time. One fall reported over the past 6 months as noted above. Lymphedema Life Impact Scale: 16/68; 24% impairment. Past Medical History:   Past Medical History:   Diagnosis Date    Anemia, chronic disease 7/31/2012    Arrhythmia 2006, 2008    [address]    Arthritis     OSTEO    CAD (coronary artery disease)     BLOCKAGES BILATERAL CAROTID, SAW MD 1 MONTH AGO    Chronic pain     LOWER BACK    Depression     Diabetes ([address]     TYPE 2, INSULIN    Hypercholesterolemia     Hypertension     Hypothyroidism 12/29/2011    Macular degeneration 9/18/2015    Osteoporosis     Pulmonary embolism ([address]     Pulmonary hypertension, mild ([address] 2006    Restrictive airway disease     Thromboembolus ([address] 2009    LEFT KNEE AFTER REPLACEMENT    Thyroid disease     HYPOTHYROID    Unspecified sleep apnea     USES CPAP     Past Surgical History:   Procedure Laterality Date    ENDOSCOPY, COLON, DIAGNOSTIC      2003;neg    HX HEART CATHETERIZATION  2006, 2008    2 TIMES    HX TONSILLECTOMY  1951    GA ANESTH,SURGERY OF SHOULDER      GA TOTAL KNEE ARTHROPLASTY  2009    LEFT     Current Medications:    Current Outpatient Medications   Medication Sig    Accu-Chek Fastclix Lancet Drum misc CHECK BLOOD SUGAR 3 TIMES A DAY    levothyroxine (SYNTHROID) 100 mcg tablet TAKE 1 TABLET BY MOUTH  DAILY BEFORE BREAKFAST    cloNIDine HCL (CATAPRES) 0.2 mg tablet Take 1 Tablet by mouth three (3) times daily.  atorvastatin (LIPITOR) 20 mg tablet Take 1 Tablet by mouth daily.     furosemide (LASIX) 40 mg tablet TAKE 1 AND 1/2 TABLETS BY  MOUTH TWO TIMES DAILY (Patient taking differently: Take 40 mg by mouth two (2) times a day.)    glucose blood VI test strips (Accu-Chek SmartView Test Strip) strip Check BS TID.  potassium chloride SR (KLOR-CON 10) 10 mEq tablet TAKE 1 TABLET BY MOUTH  TWICE A DAY    cloNIDine HCL (CATAPRES) 0.2 mg tablet TAKE 1 TABLET BY MOUTH 3  TIMES DAILY    Insulin Syringes, Disposable, 1 mL syrg Use with Insulin as directed.  colchicine 0.6 mg tablet Take 1 Tab by mouth daily as needed. For gout.  Cholecalciferol, Vitamin D3, (VITAMIN D3) 1,000 unit cap Take 1,000 Units by mouth daily.  ferrous sulfate (SLOW FE) 142 mg (45 mg iron) ER tablet Take  by mouth Daily (before breakfast).  VIT C/AMANDA AC/LUT/COPPER/ZNOX (PRESERVISION LUTEIN PO) Take  by mouth two (2) times a day.  hydrALAZINE (APRESOLINE) 50 mg tablet Take 50 mg by mouth three (3) times daily.  metolazone (ZAROXOLYN) 2.5 mg tablet Take 2.5 mg by mouth. Twice a week.  ranolazine ER (RANEXA) 500 mg SR tablet Take 500 mg by mouth two (2) times a day.  diltiazem hcl 360 mg Tb24 Take 360 mg by mouth daily.  insulin regular (NOVOLIN R, HUMULIN R) 100 unit/mL injection by SubCUTAneous route. 14 units am, 4 units pm (adjusts prn).  insulin NPH (NOVOLIN N, HUMULIN N) 100 unit/mL injection by SubCUTAneous route once. 16 units in am, 8 units pm.    aspirin (ASPIRIN) 325 mg tablet Take 325 mg by mouth daily.  VIT B12/INTRINS FACT/FA CMB #2 (INTRINSI B12-FOLATE PO) Take  by mouth daily. No current facility-administered medications for this encounter. Allergies: Allergies   Allergen Reactions    Levaquin [Levofloxacin] Other (comments)     Prolonged QT interval.      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 Patient living with adult daughter.   Pt reports long-term history of LE lymphedema, well managed with wear of custom flat knit Juzo 3022SV daytime knee high stockings. Living Situation: Lives with adult daughter. Trainable Caregiver?: patient states her daughter assists her as needed. Self-care/ADLs: mod I showers, with shower seat/hand held shower. Dresses mod I, additional time. Light meal prep. Pt reports donning knee high garments mod I with donning gloves, removing stockings at home with AD. Mobility: transfers straight cane, mod I, and additional time. Gait short community distances with straight cane, able to walk from front door of clinic building to clinic 100+ feet, additional time, using elevator to get to second floor. Patient does not drive, over 5 years. Daughter obtained chair lift for patient to use up and down stairs with bedroom on second floor. Sleeping Arrangement:  bed   Adaptive Equipment Owned: cane, rollator walker, shower seat, hand held shower, CPAP. Previous Therapy:  Seen at [facility] for phase I CDT over 11 years ago, so she is familiar with outpatient treatment of lymphedema. States she has been having custom flat knit daytime compression stockings replaced at [facility] intermittently, last replaced a year and a half ago, with proximal aspect of garment mid calf. Seen at this clinic from 2/28/2018-6/1/2018 phase I CDT, cellulitis episode interrupting treatment. Returned for evaluation 12/2018, 6/25/2019 with condition stable with wear of Juzo 3022SV knee high stockings, daily use of vasopneumatic pump at home. Compression/Lymphedema Equipment:  Knee high Juzo 3022SV custom flat knit stockings for daytime wear, replaced in 8/2021, however, foot aspect long, needing to be remade. Arrives with new garments for fit today. Vasopneumatic pump for home use. SUBJECTIVE:   Patient reports she is doing well with home management of lymphedema with daily wear of custom flat knit stockings. Agreeable to fit altered garments.   Patient goals: \"have stockings that fit and that I can get off and on by myself. \"    OBJECTIVE DATA SUMMARY:   EXAMINATION/PRESENTATION/DECISION MAKING:   Pain:  6/10 heaviness bilateral LE, skin tightness. Skin and Tissue Assessment:  Dermal Status:  [x]   Intact [x]  Dry   []  Tenuous [x] Flaky   []  Wound/lesion present []  Scars   []  Dermatitis    Texture/Consistency:  [x]  Boggy: foot/ankle []  Pitting Edema:    []  Brawny []  Combination   [x]  Fibrotic/Woody: calf region bilateral LE. Pigmentation/Color Change:  []  Normal []  Hemosiderin   []  Red []  Erythematous   [x]  Hyperpigmented []  Hyperlipodermatosclerosis   Anomalies: na  []  Lymphorrhea []  Vesicles   []  Petechiae []  Warty Vercusis   []  Bullae []  Papilloma   Circulatory:  []  BELLE []  Varicosities:   []  Pulses:  []  Vascular studies ruled out DVT in past   [x]  DVT History: post TKR 2008, including PE. No DVT history since    Nails:  []   Normal  [x]   Fungus  Stemmers Sign: R>L, positive      Volumetric Measurements:   Right:  10,889.96 mL 12/15/2021; 12,176.16 mL 7/1/2021; 11,432. 62 mL 6/25/2019; 11,654. 11 mL 12/2018; 85,884. 16 mL 2/28/2018 Left: 11,553. 96 mL 12/15/2021; 13,176.97 mL 7/1/2021;  11,907.25 mL 6/25/2019.11,749.64 mL 12/2018;  12,324.83 mL 2/28/2018   % Difference: 4.15%, L>R LE    (See scanned graph)  Range of Motion: grossly WFL  Sensation:  Decreased light touch, bilateral toes. Mobility:  Bed/Chair Mobility:  Modified independent and Additional time  Transfers:  Modified independent and Additional time    Sitting Balance:  good Standing Balance:  Fair+   Gait:  Modified independent and Additional time, straight cane, decreased stride length bilateral,  Moderately slow og. Wheelchair Mobility:  na   Endurance: Moderately compromised, gait short community distances with straight cane. Stairs:  Not assessed         Safety:  Patient is alert and oriented:  x4   Safety awareness:  intact   Fall Risk?:  Moderate, ambulates with straight cane.   See gait assessment   Patient given written fall prevention handout: Yes   Precautions:  Standard lymphedema precautions to include avoiding blood pressure readings, injections and IVs or other procedures/acts that could lead to broken skin on affected area, and avoiding excessive heat, resistive activity or altitude without compression garment    LLIS: 16/68 with 24% impairment. Evaluation Time: 8:48-9:05 am    17 minutes    TREATMENT PROVIDED:   1. Treatment description: Therapist fit altered compression garments in clinic, with good fit established. Patient able to don/doff compression stockings mod I with used of dycem on floor to adjust placement of foot of garment and to remove stocking with Medi . Patient demonstrates good understanding of compression garment management, including daily use of vasopneumatic pump. Home management instructions as follows regarding compression garment wear until returning to phase I CDT:  Compression garment routine:  1. Apply daytime compression stocking to clean, dry extremity first thing in the morning, EVERY MORNING. 2. Wear compression garments until bedtime, adjusting throughout the day as needed should garment wrinkle or crease. Problem areas can be at joints, and top of garment, ensuring proximal aspect of garment positioned appropriately on extremity. 3. Launder garment nightly either by hand or washing machine in a garment bag or pillowcase. Use mild detergent with NO FABRIC SOFTENER, NO BLEACH, NO WOOLITE. Wash in cool/warm water. 4. Dry garment in dryer on low heat right side out in garment bag or pillowcase. 5. Perform skin care to extremity, cleansing skin daily with soap and water, and using low pH lotion, upward strokes to stimulate lymphatic vessels. Perform vasopneumatic pump use. 6. Daytime compression grments are NOT safe to sleep in, so remove at bedtime. 7. Perform exercise program with compression garments on.   Conditions under which to discontinue home management of lymphedema and seek medical attention:   Signs/Symptoms of infection include:  Fever, flu-like symptoms, pain/redness/warmthin extremity, sometimes with increase in swelling present. Stop wearing your compression garment if this occurs. Call your doctor immediately or go to the Emergency room to address potential infection. Remove compression with changes in circulation, numbness in extremity different from what you may experience when not wearing compression garment, pain, unexplained shortness of breath. Notify clinic if swelling increase noted prior to next scheduled appt. Night time compression may be needed for optimal management of lymphedema. Daily use of vasopneumatic pump as tolerated. Discontinue pump use/compression garment wear based on precautions noted above. Treatment time: 9:05-9:30 am  Minutes: 25  Orthotic management 2    ASSESSMENT:   Ada Guo is a 64 y.o. female who presents with stage II lymphedema, bilateral LE, limb volume discrepancy of 6.10%, R LE reduced/L LE slightly elevated since last measured 8/4/2021. However, note bilateral LE reduced since evaluation 7/01/2021, R LE by 1286.2 mL and L LE by 1623.01 mL. Patient has been wearing compression stockings fit 8/2021, however, remake requested due to foot aspect of stocking too long. New stockings received and fit today in clinic, with improvement in foot noted. Note overall improvement in bilateral LE lymphedema, with patient to return to home management phase II CDT at this time. Patient to return in 6 months for evaluation of bilateral LE lymphedema. This care is medically necessary due to the infection risk with lymphedema, and to improve functional activities. CDT is necessary to resolve swelling to allow patient to return to wearing normal clothes/footwear, and prevent worsening of symptoms, such as venous stasis ulcerations, infections, or hospitalizations.   Patient will be independent with home program strategies to allow improved ADL ability and mobility and to allow patient to return to greatest functional independence. Return to clinic in 6 months. Continue home management phase of lymphedema, notifying clinic of any questions or concerns regarding lymphedema prior to next scheduled appointment. Patient has participated in goal setting and agrees to work toward plan of care. Patient was instructed to call if questions or concerns arise. Thank you for this referral.  Maryuri Hoover PT, [facility]   Time Calculation: 42 mins    TREATMENT PLAN EFFECTIVE DATES:   12/15/2021   I have read the above plan of care for [facility]. I certify the above prescribed services are required by this patient and are medically necessary.   The above plan of care has been developed in conjunction with the lymphedema/physical therapist.       Physician Signature: ____________________________________Date:______________

## 2022-03-18 PROBLEM — E66.01 SEVERE OBESITY (BMI 35.0-39.9) WITH COMORBIDITY (HCC): Status: ACTIVE | Noted: 2018-04-13

## 2022-03-18 PROBLEM — E11.21 TYPE 2 DIABETES MELLITUS WITH NEPHROPATHY (HCC): Status: ACTIVE | Noted: 2018-01-15

## 2022-03-19 PROBLEM — F33.9 RECURRENT DEPRESSION (HCC): Status: ACTIVE | Noted: 2018-01-15

## 2022-03-19 PROBLEM — N18.30 CKD (CHRONIC KIDNEY DISEASE) STAGE 3, GFR 30-59 ML/MIN (HCC): Status: ACTIVE | Noted: 2019-06-27

## 2022-03-19 PROBLEM — E11.9 HYPERTENSION COMPLICATING DIABETES (HCC): Status: ACTIVE | Noted: 2017-02-28

## 2022-03-19 PROBLEM — I10 HYPERTENSION COMPLICATING DIABETES (HCC): Status: ACTIVE | Noted: 2017-02-28

## 2022-03-19 PROBLEM — E11.3599 TYPE 2 DIABETES MELLITUS WITH PROLIFERATIVE RETINOPATHY (HCC): Status: ACTIVE | Noted: 2018-04-13

## 2022-03-19 PROBLEM — E11.59 HYPERTENSION COMPLICATING DIABETES (HCC): Status: ACTIVE | Noted: 2017-02-28

## 2022-03-19 PROBLEM — I15.2 HYPERTENSION COMPLICATING DIABETES (HCC): Status: ACTIVE | Noted: 2017-02-28

## 2022-03-20 PROBLEM — H35.3290 EXUDATIVE AGE-RELATED MACULAR DEGENERATION (HCC): Status: ACTIVE | Noted: 2021-10-06

## 2022-04-14 PROBLEM — E11.42 TYPE 2 DIABETES MELLITUS WITH DIABETIC POLYNEUROPATHY, WITH LONG-TERM CURRENT USE OF INSULIN (HCC): Status: ACTIVE | Noted: 2022-04-14

## 2022-04-14 PROBLEM — Z79.4 TYPE 2 DIABETES MELLITUS WITH DIABETIC POLYNEUROPATHY, WITH LONG-TERM CURRENT USE OF INSULIN (HCC): Status: ACTIVE | Noted: 2022-04-14

## 2022-04-14 PROBLEM — G60.9 IDIOPATHIC PERIPHERAL NEUROPATHY: Status: ACTIVE | Noted: 2022-04-14

## 2022-04-27 ENCOUNTER — TELEPHONE (OUTPATIENT)
Dept: PALLATIVE CARE | Age: 77
End: 2022-04-27

## 2022-04-27 NOTE — TELEPHONE ENCOUNTER
Dr. Che Jenkins Note  [phone]  Fax [phone]     Name:  Jack Sewell  YOB: 1945    Nurse returned call to patient who called inquiring about an acupuncture appointment. Patient states that she has diabetic neuropathy. Nurse explained to patient that Dr. Jose Alejandro Watts is now only seeing active cancer patients in her acupuncture clinic. Dr. Jose Alejandro Watts is referring non-active cancer patients to Dr. Amado Osorio, [facility], [phone]. Nurse gave pt this information.     NOELLE RaiN, RN, [facility]  Clinical Referral Navigator

## 2022-06-14 ENCOUNTER — HOSPITAL ENCOUNTER (OUTPATIENT)
Dept: PHYSICAL THERAPY | Age: 77
Discharge: HOME | End: 2022-06-14
Payer: MEDICARE

## 2022-06-14 VITALS
HEIGHT: 65 IN | WEIGHT: 229 LBS | SYSTOLIC BLOOD PRESSURE: 140 MMHG | DIASTOLIC BLOOD PRESSURE: 76 MMHG | BODY MASS INDEX: 38.15 KG/M2 | HEART RATE: 73 BPM

## 2022-06-14 PROCEDURE — 97161 PT EVAL LOW COMPLEX 20 MIN: CPT

## 2022-06-14 PROCEDURE — 97760 ORTHOTIC MGMT&TRAING 1ST ENC: CPT

## 2022-06-14 NOTE — PROGRESS NOTES
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 [x]   I87.2 Venous Insufficiency   []   I89.0 Lymphedema, other secondary  []   I83.019 Venous Stasis Ulcer LE, Right   []   I89.9 Unspecified Lymphatic Disorder []   I83.029 Venous Stasis Ulcer LE, Left   [x]   R60.9 Swelling not relieved by elevation [x]   Q82.0 Hereditary/ Congenital Lymphedema   []   C50.211 Malignant neoplasm of breast, Right []   G89.3  Cancer associated pain   []   C50.212 Malignant neoplasm of breast, Left []   L03.115 LE Cellulitis, Right   [x]  R26.89 Abnormality of gait []   L03.116 LE Cellulitis, Left                                                           Clinton Jordan    1945  Page 2 of 2    Physician Order for DME for Diagnosis of Q82.0 primary lymphedema as Listed on Statement of Medical Necessity, Page 1        Recommended Product:  Units Upper Extremity Rt Lt Units Lower Extremity Rt Lt    Circ-Aid, Ready Wrap, Sigvaris Arm    Inelastic binders (Lenora Curiel)  []Foot   []Below Knee   []Knee   []Thigh      Circ-Aid Ready Wrap, Sigvaris hand    Kalia Miquel, night use []Full Leg  []Lower Leg      Tribute Arm, night use    Tribute, night use  []Full Leg  []Lower Leg      Kalia Miquel Arm, night use    Malik Sleeve Leg/ Foot, night use      Gradiant Compression Sleeves & Gloves  []Custom [] RM Arm:  []CCL1 []CCL2 []CCL3  []Custom [] RM Glove: []CCL1 []CCL2 []CCL3     4 Gradient Compression Stockings   [x]Custom  []RM Lower Extremity:   []CCL1       [x]CCL2         []CCL3   [x]Knee       []Thigh        []Waist Length x x    Malik sleeve arm w/ hand, night use    Tribute Wrap, night use      Compression Bra          Compression Vest         The above patient was referred for treatment of Lymphedema due to the diagnosis indicated above. Lymphedema is a progressive and chronic condition. It may cause acute infections, muscle atrophy, discomfort, and connective tissue fibrosis with irreversible tissue damage, decreased ROM in the affected limb and diminished functional mobility possibly interfering with independence and ability to work. Recurrent infections and wound complications that commonly occur with Lymphedema often require hospitalization and extensive wound care, thus increasing medical costs. The patient has received complete decongestive therapy which includes manual lymphatic drainage, lymphedema specific exercises, compression bandaging, and hygiene/skin care. Goals of therapy are to reduce the edema and prevent re-accumulation of fluid with its complications. It is medically necessary for this patient to have daytime garments to control this condition. They will need 2 sets (one set to wear and one set to wash for adequate skin care and wearing time). Garments must be replaced every 4-6 months for effectiveness. There are no substitutes available that offer acceptable compression treatment for this Lymphedema patient. If further information is requested, please contact our certified lymphedema therapists at [phone].     [] Arcenio Kim, PT, DPT, ANANDA  [x] Sasha Beasley, PT, [facility]  [] Mery Valle, PT, DPT, [facility]      Printed  Provider Name Jn Hernandez Provider Signature  Date    Provider NPI [id number]

## 2022-06-14 NOTE — PROGRESS NOTES
[facility] [facility]  [address] [facility]  [address]  [address]       EVALUATION    NAME: Kristine Spurling AGE: 76 y.o. GENDER: female  DATE: 6/14/2022  REFERRING PHYSICIAN: Quang Mcallister MD  HISTORY AND BACKGROUND:  Patient is a 77 y/o female with history LE lymphedema for greater than 13 years, returning here for evaluation and measurements to replace compression garments. Patient reports no changes in medical history since previously seen. Lymphedema history as follows:    Patient received treatment at [facility] prior to TKR in 2008, having undergone phase I CDT. Patient has continued with wear of custom flat knit knee high compression stockings, having them replaced via fitter at [facility], with most recent pair purchased 1-2 years ago. Note garments covering only half of lower legs, with fit likely impaired by recent progression of lymphedema. Oral diuretic has been increased to address increase in limb size, with patient reporting taking 60 mg Furosemide 2x/day. States legs have reduced in size, however, physicians nor patient want high dose of diuretic to continue. Patient was seen at this clinic 2/28/2018-6/1/2018, for phase I CDT, fit with custom flat knit compression stockings and transitioned to home management of lymphedema, with patient last seen 12/15/2022 at this clinic.    Primary Diagnosis:  · Primary lymphedema (Q82.0)  Other Treatment Diagnoses:   Abnormality of gait (other abnormalities of gait and mobility R26.89)   Swelling not relieved by elevation ( R60.9 edema)   Venous insufficiency I87.2;    L LE DVT post TKE 2008 (L03.114)   PE (I26.9) 2008  Morbid Obesity (E66.01)  Date of Onset: prior to 2008  Present Symptoms and Functional Limitations: Bilateral LE lymphedema managed with daily wear of custom flat knit compression stockings replaced 12/2021. Patient reports don/doff compression garments with AD. Patient with c/o pain, heaviness, skin tightness, decreased movement bilateral LE secondary to LE lymphedema. Reports lymphedema results in impairment with leisure activities and sleep. Gait deficits noted, patient using straight cane for gait, transfers/bed mobility require additional time. Without falls past 6 months. Lymphedema Life Impact Scale: 18/68; 26% impairment. Past Medical History:   Past Medical History:   Diagnosis Date    Anemia, chronic disease 7/31/2012    Arrhythmia 2006, 2008    [address] [facility] [place] [place] CATH    Arthritis     OSTEO    CAD (coronary artery disease)     BLOCKAGES BILATERAL CAROTID, SAW MD 1 MONTH AGO    Chronic pain     LOWER BACK    Depression     Diabetes ([address]     TYPE 2, INSULIN    Hypercholesterolemia     Hypertension     Hypothyroidism 12/29/2011    Macular degeneration 9/18/2015    Osteoporosis     Pulmonary embolism ([address]     Pulmonary hypertension, mild ([address] 2006    Restrictive airway disease     Thromboembolus ([address] 2009    LEFT KNEE AFTER REPLACEMENT    Thyroid disease     HYPOTHYROID    Unspecified sleep apnea     USES CPAP     Past Surgical History:   Procedure Laterality Date    ENDOSCOPY, COLON, DIAGNOSTIC      2003;neg    HX HEART CATHETERIZATION  2006, 2008    2 TIMES    HX TONSILLECTOMY  1951    NH ANESTH,SURGERY OF SHOULDER      NH TOTAL KNEE ARTHROPLASTY  2009    LEFT     Current Medications: Patient reports Diltiazem HCL discontinued  Current Outpatient Medications   Medication Sig    Syringe with Needle, Disp, (BD Luer-Clare Syringe) 3 mL 25 gauge x 1\" syrg Use with insulin as directed.  potassium chloride SR (KLOR-CON 10) 10 mEq tablet Take 1 Tablet by mouth two (2) times a day.     glucose blood VI test strips (Accu-Chek SmartView Test Strip) strip CHECK BLOOD SUGAR 3 TIMES A DAY E11.9    cloNIDine HCL (CATAPRES) 0.2 mg tablet TAKE 1 TABLET BY MOUTH 3  TIMES DAILY  Accu-Chek Fastclix Lancet Drum [facility] CHECK BLOOD SUGAR 3 TIMES A DAY    levothyroxine (SYNTHROID) 100 mcg tablet TAKE 1 TABLET BY MOUTH  DAILY BEFORE BREAKFAST    atorvastatin (LIPITOR) 20 mg tablet Take 1 Tablet by mouth daily.  furosemide (LASIX) 40 mg tablet TAKE 1 AND 1/2 TABLETS BY  MOUTH TWO TIMES DAILY (Patient taking differently: Take 40 mg by mouth two (2) times a day.)    Insulin Syringes, Disposable, 1 mL syrg Use with Insulin as directed.  colchicine 0.6 mg tablet Take 1 Tab by mouth daily as needed. For gout.  Cholecalciferol, Vitamin D3, (VITAMIN D3) 1,000 unit cap Take 1,000 Units by mouth daily.  ferrous sulfate (SLOW FE) 142 mg (45 mg iron) ER tablet Take  by mouth Daily (before breakfast).  VIT C/AMANDA AC/LUT/COPPER/ZNOX (PRESERVISION LUTEIN PO) Take  by mouth two (2) times a day.  hydrALAZINE (APRESOLINE) 50 mg tablet Take 50 mg by mouth three (3) times daily.  metolazone (ZAROXOLYN) 2.5 mg tablet Take 2.5 mg by mouth. Twice a week.  ranolazine ER (RANEXA) 500 mg SR tablet Take 500 mg by mouth two (2) times a day.  diltiazem hcl 360 mg Tb24 Take 360 mg by mouth daily.  insulin regular (NOVOLIN R, HUMULIN R) 100 unit/mL injection by SubCUTAneous route. 14 units am, 4 units pm (adjusts prn).  insulin NPH (NOVOLIN N, HUMULIN N) 100 unit/mL injection by SubCUTAneous route once. 16 units in am, 8 units pm.    aspirin (ASPIRIN) 325 mg tablet Take 325 mg by mouth daily.  VIT B12/INTRINS FACT/FA CMB #2 (INTRINSI B12-FOLATE PO) Take  by mouth daily. No current facility-administered medications for this encounter. Allergies: Allergies   Allergen Reactions    Levaquin [Levofloxacin] Other (comments)     Prolonged QT interval.      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 Patient living with adult daughter.   Pt reports long-term history of LE lymphedema, well managed with wear of custom flat knit Juzo 3022SV daytime knee high stockings. Prior to lymphedema onset 2008, patient without c/o LE edema. Living Situation: Lives with adult daughter. Trainable Caregiver?: patient states her daughter assists her as needed. Self-care/ADLs: mod I showers, with shower seat/hand held shower. Dresses mod I, additional time. Light meal prep. Pt reports donning knee high garments mod I with donning gloves, removing stockings at home with AD. Mobility: transfers straight cane, mod I, and additional time. Gait short community distances with straight cane, able to walk from front door of clinic building to clinic 100+ feet, additional time, using elevator to get to second floor. Patient does not drive, over 5 years. Daughter obtained chair lift for patient to use up and down stairs with bedroom on second floor. Sleeping Arrangement:  bed   Adaptive Equipment Owned: cane, rollator walker, shower seat, hand held shower, CPAP. Previous Therapy:  Seen at [facility] for phase I CDT over 2008 years ago. Has continued with daily wear of custom flat knit compression garments. Seen at this clinic from 2/28/2018-6/1/2018 phase I CDT, cellulitis episode interrupting treatment. Returned for evaluation 12/2018, 6/25/2019. 7/1/2021,  12/15/2021 with condition stable with wear of Juzo 3022SV knee high stockings, daily use of vasopneumatic pump at home. Compression/Lymphedema Equipment:  Knee high Juzo 3022SV custom flat knit stockings for daytime wear, replaced in 12/15/2021. Arrives with new garments donned. Vasopneumatic pump for home use. SUBJECTIVE:   Patient reports she is doing well with home management of lymphedema with daily wear of custom flat knit stockings. States she would like to order compression stockings. States she does not perform skin care nightly as recommended.   Asks if she can remove stockings 15 minutes earlier than normal to perform skin care. Patient goals: \"get new stockings. \"    OBJECTIVE DATA SUMMARY:   EXAMINATION/PRESENTATION/DECISION MAKING:   Pain:  6/10 heaviness bilateral LE, skin tightness. Pain Scale 1: Numeric (0 - 10)  Pain Intensity 1: 6  Pain Location 1: Leg  Skin and Tissue Assessment:  Dermal Status:  [x]   Intact [x]  Dry   []  Tenuous [x] Flaky   []  Wound/lesion present []  Scars   []  Dermatitis    Texture/Consistency:  [x]  Boggy: foot/ankle []  Pitting Edema:    []  Brawny []  Combination   [x]  Fibrotic/Woody: calf region bilateral LE. Pigmentation/Color Change:  []  Normal []  Hemosiderin   []  Red []  Erythematous   [x]  Hyperpigmented []  Hyperlipodermatosclerosis   Anomalies: na  []  Lymphorrhea []  Vesicles   []  Petechiae []  Warty Vercusis   []  Bullae []  Papilloma   Circulatory:  []  BELLE []  Varicosities:   []  Pulses:  []  Vascular studies ruled out DVT in past   [x]  DVT History: post TKR 2008, including PE. No DVT history since    Nails:  []   Normal  [x]   Fungus  Stemmers Sign: R>L, positive      Volumetric Measurements:   Right: 11,379.12 mL 6/14/2022; 10,889.96 mL 12/15/2021; 12,176.16 mL 7/1/2021; 11,432. 62 mL 6/25/2019; 11,654. 11 mL 12/2018; 00,382. 16 mL 2/28/2018 Left: 12,050.09 mL 6/14/2022; 11,553. 96 mL 12/15/2021; 13,176.97 mL 7/1/2021;  11,907.25 mL 6/25/2019.11,749.64 mL 12/2018;  12,324.83 mL 2/28/2018   % Difference: 5.90%, L>R LE    (See scanned graph)  Range of Motion: grossly [place] [place]  Strength grossly 4+/5 with MMT bilateral LE. Sensation:  Decreased light touch, bilateral toes. Mobility:  Bed/Chair Mobility:  Modified independent and Additional time  Transfers:  Modified independent and Additional time    Sitting Balance:  good Standing Balance:  Fair+   Gait:  Modified independent and Additional time, straight cane, decreased stride length bilateral,  Moderately slow og. Wheelchair Mobility:  na   Endurance:   Moderately compromised, gait short community distances with straight cane. Stairs:  Not assessed         Safety:  Patient is alert and oriented:  x4   Safety awareness:  intact   Fall Risk?:  Moderate, ambulates with straight cane. See gait assessment   Patient given written fall prevention handout: Yes   Precautions:  Standard lymphedema precautions to include avoiding blood pressure readings, injections and IVs or other procedures/acts that could lead to broken skin on affected area, and avoiding excessive heat, resistive activity or altitude without compression garment    In compliance with CMSs Claims Based Outcome Reporting, the following G-code set was chosen for this patient based on their primary functional limitation being treated: The outcome measure chosen to determine the severity of the functional limitation was the LLIS with a score of 18/68 which was correlated with the impairment scale. ? Other PT/OT Primary Functional Limitations:     - CURRENT STATUS: CJ - 20%-39% impaired, limited or restricted    - GOAL STATUS: CJ - 20%-39% impaired, limited or restricted    - D/C STATUS:  ---------------To be determined---------------     Physical Therapy Evaluation Charge Determination   History Examination Presentation Decision-Making   MEDIUM  Complexity : 1-2 comorbidities / personal factors will impact the outcome/ POC  LOW Complexity : 1-2 Standardized tests and measures addressing body structure, function, activity limitation and / or participation in recreation  LOW Complexity : Stable, uncomplicated  Other outcome measures LLIS  LOW       Based on the above components, the patient evaluation is determined to be of the following complexity level: LOW       Evaluation Time: 11:26-11:45 am    19 minutes    TREATMENT PROVIDED:   1. Treatment description: Therapist completed measurements for Ritaryanne 3022SV knee high compression stockings. Toe cap longer on currently owned stockings per patient report.   Slant toe measurements completed, with garment order sent to . Patient advised to return to clinic for fit of compression stockings to ensure appropriate fit attained. Patient demonstrates good understanding of compression garment management, including daily use of vasopneumatic pump. Home management instructions continued follows regarding compression garment wear;  Compression garment routine:  1. Apply daytime compression stocking to clean, dry extremity first thing in the morning, EVERY MORNING. 2. Wear compression garments until bedtime, adjusting throughout the day as needed should garment wrinkle or crease. Problem areas can be at joints, and top of garment, ensuring proximal aspect of garment positioned appropriately on extremity. 3. Launder garment nightly either by hand or washing machine in a garment bag or pillowcase. Use mild detergent with NO FABRIC SOFTENER, NO BLEACH, NO WOOLITE. Wash in cool/warm water. 4. Dry garment in dryer on low heat right side out in garment bag or pillowcase. 5. Perform skin care to extremity, cleansing skin daily with soap and water, and using low pH lotion, upward strokes to stimulate lymphatic vessels. Perform vasopneumatic pump use. 6. Daytime compression grments are NOT safe to sleep in, so remove at bedtime. 7. Perform exercise program with compression garments on. Conditions under which to discontinue home management of lymphedema and seek medical attention:   Signs/Symptoms of infection include:  Fever, flu-like symptoms, pain/redness/warmthin extremity, sometimes with increase in swelling present. Stop wearing your compression garment if this occurs. Call your doctor immediately or go to the Emergency room to address potential infection. Remove compression with changes in circulation, numbness in extremity different from what you may experience when not wearing compression garment, pain, unexplained shortness of breath.     Notify clinic if swelling increase noted prior to next scheduled appt. Night time compression may be needed for optimal management of lymphedema. Daily use of vasopneumatic pump as tolerated. Discontinue pump use/compression garment wear based on precautions noted above. Treatment time: 11:45 am-12:10 pm  Minutes: 25  Orthotic management 2    ASSESSMENT:   Erika Obrien is a 76 y.o. female who presents with stage II lymphedema, bilateral LE, limb volume discrepancy of 5.90%. Note elevation in bilateral LE limb volume measurements since seen 12/15/2021, however, measurements with slight fluctuations since treatment initiated in clinic 2/28/2018. Patient has been wearing compression stockings fit 12/2021, with order sent to  for replacement custom flat knit knee high compression stockings, with patient to return to clinic in 1 month for fit of stockings. This care is medically necessary due to the infection risk with lymphedema, and to improve functional activities. CDT is necessary to resolve swelling to allow patient to return to wearing normal clothes/footwear, and prevent worsening of symptoms, such as venous stasis ulcerations, infections, or hospitalizations. Patient will be independent with home program strategies to allow improved ADL ability and mobility and to allow patient to return to greatest functional independence. Recommend 2-5 PT visits over the next 12 weeks to establish appropriate fit of custom flat knit compression stockings, allowing patient to return to phase I CDT. Plan  Manual therapy  Compression garment provision/fit  Lymphedema specific exercise   Lymphedema specific skin care   Goals: 12 weejs  1. Instruct the patient to be independent with proper skin care to prevent future skin breakdown and decrease the potential risk for infections that are associated with Lymphedema.   2. Patient will be independent with a personal lymphedema exercise program to assist with the lymphatic flow and reduce limb volume    3. Patient will have knowledge of the compression options and acquire a safe and appropriate daytime and night time compression system to prevent    re-accumulation of fluid. 4.  Patient or family will be able to don/doff garments independently. Garment  system effectiveness will be evaluated prior to discharge for better long-term   management and outcomes. 5.   To transition patient to independent restorative phase of CDT. Patient has participated in goal setting and agrees to work toward plan of care. Patient was instructed to call if questions or concerns arise. Thank you for this referral.  Trinidad Bowden PT, S[facility]   Time Calculation: 44 mins    TREATMENT PLAN EFFECTIVE DATES:   6/14/2022 to 9/10/2022  I have read the above plan of care for [facility]. I certify the above prescribed services are required by this patient and are medically necessary.   The above plan of care has been developed in conjunction with the lymphedema/physical therapist.       Physician Signature: ____________________________________Date:______________

## 2022-07-20 ENCOUNTER — APPOINTMENT (OUTPATIENT)
Dept: PHYSICAL THERAPY | Age: 77
End: 2022-07-20

## 2022-08-29 ENCOUNTER — OFFICE VISIT (OUTPATIENT)
Dept: ORTHOPEDIC SURGERY | Age: 77
End: 2022-08-29
Payer: MEDICARE

## 2022-08-29 VITALS — BODY MASS INDEX: 36.96 KG/M2 | HEIGHT: 66 IN | WEIGHT: 230 LBS

## 2022-08-29 DIAGNOSIS — G89.29 CHRONIC PAIN OF RIGHT KNEE: Primary | ICD-10-CM

## 2022-08-29 DIAGNOSIS — M25.561 CHRONIC PAIN OF RIGHT KNEE: Primary | ICD-10-CM

## 2022-08-29 DIAGNOSIS — M17.11 TRICOMPARTMENT OSTEOARTHRITIS OF RIGHT KNEE: ICD-10-CM

## 2022-08-29 PROCEDURE — 20610 DRAIN/INJ JOINT/BURSA W/O US: CPT | Performed by: ORTHOPAEDIC SURGERY

## 2022-08-29 PROCEDURE — 99214 OFFICE O/P EST MOD 30 MIN: CPT | Performed by: ORTHOPAEDIC SURGERY

## 2022-08-29 PROCEDURE — G9717 DOC PT DX DEP/BP F/U NT REQ: HCPCS | Performed by: ORTHOPAEDIC SURGERY

## 2022-08-29 PROCEDURE — G8399 PT W/DXA RESULTS DOCUMENT: HCPCS | Performed by: ORTHOPAEDIC SURGERY

## 2022-08-29 PROCEDURE — 1123F ACP DISCUSS/DSCN MKR DOCD: CPT | Performed by: ORTHOPAEDIC SURGERY

## 2022-08-29 PROCEDURE — G8536 NO DOC ELDER MAL SCRN: HCPCS | Performed by: ORTHOPAEDIC SURGERY

## 2022-08-29 PROCEDURE — G8427 DOCREV CUR MEDS BY ELIG CLIN: HCPCS | Performed by: ORTHOPAEDIC SURGERY

## 2022-08-29 PROCEDURE — G8417 CALC BMI ABV UP PARAM F/U: HCPCS | Performed by: ORTHOPAEDIC SURGERY

## 2022-08-29 PROCEDURE — 1090F PRES/ABSN URINE INCON ASSESS: CPT | Performed by: ORTHOPAEDIC SURGERY

## 2022-08-29 PROCEDURE — 1101F PT FALLS ASSESS-DOCD LE1/YR: CPT | Performed by: ORTHOPAEDIC SURGERY

## 2022-08-29 NOTE — PROGRESS NOTES
Иван Zavala (: 1945) is a 76 y.o. female, patient, here for evaluation of the following chief complaint(s):  Knee Pain (Right)       HPI:    She began having increased right knee pain 5 days ago. The patient reports no specific injury. She describes her right knee pain as severe, sharp, throbbing, and constant. Her right knee pain continues to get worse. She reports that standing, walking, and lying in bed make her pain worse and nothing seems to make her pain better. She was not seen in the emergency room for right knee pain. The patient reports no previous or related right knee surgery. Allergies   Allergen Reactions    Levaquin [Levofloxacin] Other (comments)     Prolonged QT interval.       Current Outpatient Medications   Medication Sig    furosemide (LASIX) 40 mg tablet TAKE 1 AND 1/2 TABLETS BY  MOUTH TWICE DAILY    Syringe with Needle, Disp, (BD Luer-Clare Syringe) 3 mL 25 gauge x 1\" syrg Use with insulin as directed. potassium chloride SR (KLOR-CON 10) 10 mEq tablet Take 1 Tablet by mouth two (2) times a day. glucose blood VI test strips (Accu-Chek SmartView Test Strip) strip CHECK BLOOD SUGAR 3 TIMES A DAY E11.9    cloNIDine HCL (CATAPRES) 0.2 mg tablet TAKE 1 TABLET BY MOUTH 3  TIMES DAILY    Accu-Chek Fastclix Lancet Drum misc CHECK BLOOD SUGAR 3 TIMES A DAY    levothyroxine (SYNTHROID) 100 mcg tablet TAKE 1 TABLET BY MOUTH  DAILY BEFORE BREAKFAST    atorvastatin (LIPITOR) 20 mg tablet Take 1 Tablet by mouth daily. Insulin Syringes, Disposable, 1 mL syrg Use with Insulin as directed. colchicine 0.6 mg tablet Take 1 Tab by mouth daily as needed. For gout. Cholecalciferol, Vitamin D3, (VITAMIN D3) 1,000 unit cap Take 1,000 Units by mouth daily. ferrous sulfate (SLOW FE) 142 mg (45 mg iron) ER tablet Take  by mouth Daily (before breakfast). VIT C/AMANDA AC/LUT/COPPER/ZNOX (PRESERVISION LUTEIN PO) Take  by mouth two (2) times a day.     hydrALAZINE (APRESOLINE) 50 mg tablet Take 50 mg by mouth three (3) times daily. metolazone (ZAROXOLYN) 2.5 mg tablet Take 2.5 mg by mouth. Twice a week. ranolazine ER (RANEXA) 500 mg SR tablet Take 500 mg by mouth two (2) times a day. diltiazem hcl 360 mg Tb24 Take 360 mg by mouth daily. insulin regular (NOVOLIN R, HUMULIN R) 100 unit/mL injection by SubCUTAneous route. 14 units am, 4 units pm (adjusts prn). insulin NPH (NOVOLIN N, HUMULIN N) 100 unit/mL injection by SubCUTAneous route once. 16 units in am, 8 units pm.    aspirin (ASPIRIN) 325 mg tablet Take 325 mg by mouth daily. VIT B12/INTRINS FACT/FA CMB #2 (INTRINSI B12-FOLATE PO) Take  by mouth daily.      Current Facility-Administered Medications   Medication    hyaluronate sodium, cross-linked (GEL-ONE) injection syrg 30 mg       Past Medical History:   Diagnosis Date    Anemia, chronic disease 7/31/2012    Arrhythmia 2006, 2008    [address] [facility]    Arthritis     OSTEO    CAD (coronary artery disease)     BLOCKAGES BILATERAL CAROTID, SAW MD 1 MONTH AGO    Chronic pain     LOWER BACK    Depression     Diabetes ([address]     TYPE 2, INSULIN    Hypercholesterolemia     Hypertension     Hypothyroidism 12/29/2011    Macular degeneration 9/18/2015    Osteoporosis     Pulmonary embolism ([address]     Pulmonary hypertension, mild ([address] 2006    Restrictive airway disease     Thromboembolus ([address] 2009    LEFT KNEE AFTER REPLACEMENT    Thyroid disease     HYPOTHYROID    Unspecified sleep apnea     USES CPAP        Past Surgical History:   Procedure Laterality Date    ENDOSCOPY, COLON, DIAGNOSTIC      2003;neg    HX HEART CATHETERIZATION  2006, 2008    2 TIMES    HX TONSILLECTOMY  1951    MD ANESTH,SURGERY OF SHOULDER      MD TOTAL KNEE ARTHROPLASTY  2009    LEFT       Family History   Problem Relation Age of Onset    Heart Disease Mother     Diabetes Mother     Stroke Mother     Heart Disease Father     Diabetes Father     Stroke Father     Cancer Father         PROSTATE Hypertension Sister     Hypertension Sister     Hypertension Sister         Social History     Socioeconomic History    Marital status:      Spouse name: Not on file    Number of children: Not on file    Years of education: Not on file    Highest education level: Not on file   Occupational History    Not on file   Tobacco Use    Smoking status: Former     Packs/day: 0.25     Years: 5.00     Pack years: 1.25     Types: Cigarettes     Quit date: 1994     Years since quittin.3    Smokeless tobacco: Never   Substance and Sexual Activity    Alcohol use: Yes     Comment: RARE    Drug use: Not on file    Sexual activity: Not on file   Other Topics Concern    Not on file   Social History Narrative    Not on file     Social Determinants of Health     Financial Resource Strain: Not on file   Food Insecurity: Not on file   Transportation Needs: Not on file   Physical Activity: Not on file   Stress: Not on file   Social Connections: Not on file   Intimate Partner Violence: Not on file   Housing Stability: Not on file       Review of Systems   All other systems reviewed and are negative. Vitals:  Ht 5' 6\" (1.676 m)   Wt 230 lb (104.3 kg)   BMI 37.12 kg/m²    Body mass index is 37.12 kg/m². Ortho Exam     The patient is well-developed and well-nourished. The patient presents today in alert and oriented x3 with a normal mood and affect. The patient stands with a normal weightbearing line but walks with a slightly antalgic gait because of her right knee pain. Right knee, the patient is tender to palpation along the medial, lateral, and patellofemoral joint line, and has no significant effusion. They are tender to palpation along the medial and lateral facets of the patella. She does have pain with patellar compression and quadriceps contraction with crepitus. The patient has no discomfort with Aidee's maneuvers, and the knee is stable. They have limited range of motion.  They have 5/5 strength, and are neurovascularly intact distally. There is no erythema, warmth or skin lesions present. ASSESSMENT/PLAN:      1. Chronic pain of right knee  -     XR KNEE RT MIN 4 V; Future  2. Tricompartment osteoarthritis of right knee  -     hyaluronate sodium, cross-linked (GEL-ONE) injection syrg 30 mg; 30 mg, Intra artICUlar, ONCE, 1 dose, On Mon 8/29/22 at 0900     XR Results (most recent):  Results from Appointment encounter on 08/29/22    XR KNEE RT MIN 4 V    Narrative  Right knee 4 view x-ray showed no evidence of a fracture or dislocation. Evidence of significant end-stage tricompartmental osteoarthritis. Evidence of a previous left total knee arthroplasty in good alignment and position with no sign of loosening. Below is the assessment and plan developed based on review of pertinent history, physical exam, labs, studies, and medications. We discussed the patient's right knee pain and her signs, symptoms, physical exam, description of her pain, and x-rays are consistent with significant end-stage bone-on-bone tricompartmental osteoarthritis. The possible treatment options were discussed with the patient and we elected to inject her right knee with viscosupplementation today to try and alleviate some of her discomfort. The risks and benefits of the injection were discussed in detail with the patient and under sterile prep the patient's right knee was injected with 1 vial of gel 1. She tolerated the injection well. I did encourage her to continue to ice and elevate when possible, modify her activity level based on her right knee pain, and use anti-inflammatory medication when necessary. The patient will work on range of motion, strengthening, and stretching exercises with an at-home exercise program as pain tolerates. She is to avoid any deep knee bend activities against resistance, squatting, kneeling, stairs, lunging, and high impact loading activities.   If she receives good pain relief from this injection we will repeat another injection at least 6 months from now. In the interim, I will see her back on an as-needed basis. If she continues to have persistence of her pain she will consider following up with one of our total knee specialist.    Return if symptoms worsen or fail to improve. An electronic signature was used to authenticate this note.   -- Gabriel Porter MD

## 2022-08-30 ENCOUNTER — APPOINTMENT (OUTPATIENT)
Dept: PHYSICAL THERAPY | Age: 77
End: 2022-08-30

## 2022-12-12 ENCOUNTER — OFFICE VISIT (OUTPATIENT)
Dept: SURGERY | Age: 77
End: 2022-12-12
Payer: MEDICARE

## 2022-12-12 VITALS
HEIGHT: 66 IN | HEART RATE: 72 BPM | WEIGHT: 231.5 LBS | RESPIRATION RATE: 18 BRPM | OXYGEN SATURATION: 99 % | DIASTOLIC BLOOD PRESSURE: 62 MMHG | BODY MASS INDEX: 37.21 KG/M2 | SYSTOLIC BLOOD PRESSURE: 140 MMHG | TEMPERATURE: 97.8 F

## 2022-12-12 DIAGNOSIS — K43.9 SUPRAUMBILICAL HERNIA: Primary | ICD-10-CM

## 2022-12-12 PROBLEM — N18.4 CKD (CHRONIC KIDNEY DISEASE) STAGE 4, GFR 15-29 ML/MIN (HCC): Status: ACTIVE | Noted: 2022-12-12

## 2022-12-12 PROCEDURE — 99202 OFFICE O/P NEW SF 15 MIN: CPT | Performed by: SURGERY

## 2022-12-12 PROCEDURE — G9717 DOC PT DX DEP/BP F/U NT REQ: HCPCS | Performed by: SURGERY

## 2022-12-12 PROCEDURE — G8427 DOCREV CUR MEDS BY ELIG CLIN: HCPCS | Performed by: SURGERY

## 2022-12-12 PROCEDURE — G8536 NO DOC ELDER MAL SCRN: HCPCS | Performed by: SURGERY

## 2022-12-12 PROCEDURE — 1090F PRES/ABSN URINE INCON ASSESS: CPT | Performed by: SURGERY

## 2022-12-12 PROCEDURE — 1123F ACP DISCUSS/DSCN MKR DOCD: CPT | Performed by: SURGERY

## 2022-12-12 PROCEDURE — G8417 CALC BMI ABV UP PARAM F/U: HCPCS | Performed by: SURGERY

## 2022-12-12 PROCEDURE — 1101F PT FALLS ASSESS-DOCD LE1/YR: CPT | Performed by: SURGERY

## 2022-12-12 PROCEDURE — G8399 PT W/DXA RESULTS DOCUMENT: HCPCS | Performed by: SURGERY

## 2022-12-12 NOTE — PROGRESS NOTES
Identified pt with two pt identifiers (name and ). Reviewed chart in preparation for visit and have obtained necessary documentation. Johana Madison is a 76 y.o. female  Chief Complaint   Patient presents with    Hernia (Non Specific)     Visit Vitals  BP (!) 140/62 (BP 1 Location: Right arm, BP Patient Position: Sitting, BP Cuff Size: Large adult)   Pulse 72   Temp 97.8 °F (36.6 °C) (Oral)   Resp 18   Ht 5' 6\" (1.676 m)   Wt 231 lb 8 oz (105 kg)   SpO2 99%   BMI 37.37 kg/m²       1. Have you been to the ER, urgent care clinic since your last visit? Hospitalized since your last visit? No    2. Have you seen or consulted any other health care providers outside of the [address] [place] [address] since your last visit? Include any pap smears or colon screening.  No

## 2022-12-12 NOTE — PROGRESS NOTES
Ronald Sargent is a 76 y.o. female who is referred by Amarjit Mcgee. TONIE Daily for further evaluation of a supraumbilical hernia. Ms. Lyle Ramsey tells me that she has has an abdominal wall bulge, in the midline above the umbilicus, for some time now. The bulge has become progressively larger and more bothersome to her. No associated nausea or vomiting. Ms. Lyle Ramsey does report problems with constipation. No chronic cough. No previous abdominal surgery. Found to have a supraumbilical hernia. She has otherwise been in her usual state of health.      Past Medical History:   Diagnosis Date    Anemia, chronic disease 7/31/2012    Arrhythmia 2006, 2008    [facility]    Arthritis     OSTEO    CAD (coronary artery disease)     BLOCKAGES BILATERAL CAROTID, SAW MD 1 MONTH AGO    Chronic pain     LOWER BACK    Depression     Diabetes ([address]     TYPE 2, INSULIN    Hypercholesterolemia     Hypertension     Hypothyroidism 12/29/2011    Macular degeneration 9/18/2015    Osteoporosis     Pulmonary embolism ([address]     Pulmonary hypertension, mild ([address] 2006    Restrictive airway disease     Supraumbilical hernia 44/38/4899    Thromboembolus ([address] 2009    LEFT KNEE AFTER REPLACEMENT    Thyroid disease     HYPOTHYROID    Unspecified sleep apnea     USES CPAP     Past Surgical History:   Procedure Laterality Date    ENDOSCOPY, COLON, DIAGNOSTIC      2003;neg    HX HEART CATHETERIZATION  2006, 2008    2 TIMES    HX TONSILLECTOMY  1951    ND ANESTH,SURGERY OF SHOULDER      ND TOTAL KNEE ARTHROPLASTY  2009    LEFT     Family History   Problem Relation Age of Onset    Heart Disease Mother     Diabetes Mother     Stroke Mother     Heart Disease Father     Diabetes Father     Stroke Father     Cancer Father         PROSTATE    Hypertension Sister     Hypertension Sister     Hypertension Sister      Social History     Socioeconomic History    Marital status: UNKNOWN Tobacco Use    Smoking status: Former     Packs/day: 0.25     Years: 5.00     Pack years: 1.25     Types: Cigarettes     Quit date: 1994     Years since quittin.6    Smokeless tobacco: Never   Substance and Sexual Activity    Alcohol use: Yes     Comment: RARE     Review of systems negative except as noted. Review of Systems   Constitutional:  Negative for chills and fever. Respiratory:  Negative for cough. Gastrointestinal:  Positive for abdominal pain (At site of hernia.) and constipation. Negative for nausea and vomiting. Physical Exam  Vitals reviewed. Constitutional:       General: She is not in acute distress. Appearance: Normal appearance. She is obese. HENT:      Head: Normocephalic and atraumatic. Eyes:      General: No scleral icterus. Cardiovascular:      Rate and Rhythm: Normal rate and regular rhythm. Pulmonary:      Effort: Pulmonary effort is normal.      Breath sounds: Normal breath sounds. Abdominal:      General: There is no distension. Palpations: Abdomen is soft. Tenderness: There is no abdominal tenderness. There is no guarding or rebound. Hernia: A hernia is present. Hernia is present in the umbilical area (Supraumbilical hernia - reucible. ). Musculoskeletal:         General: Normal range of motion. Neurological:      General: No focal deficit present. Mental Status: She is alert. ASSESSMENT and PLAN  Ms. David Garza is a 77 yo female with a supraumbilical hernia. In view of the findings on H and P, she should benefit from repair since the hernia is symptomatic. Discussed supraumbilical hernia repair, possibly with mesh, with her today including risks of bleeding, infection, hernia recurrence. She understands but is unsure about proceeding with surgery given her multiple medical problems. Will ask Cardiology to see for risk stratification and will see her following that to review findings with her.  Activity as tolerated for now. Discussed plan with Ms. Shoshana Simone and she is agreeable.       CC: MD Killian Rose, Angel Mendoza*

## 2022-12-14 ENCOUNTER — DOCUMENTATION ONLY (OUTPATIENT)
Dept: SURGERY | Age: 77
End: 2022-12-14

## 2022-12-14 NOTE — PROGRESS NOTES
Request for patient to schedule appointment for cardiac clearance faxed to Dr. Makenzie Pino office confirmation received.

## 2023-02-17 ENCOUNTER — DOCUMENTATION ONLY (OUTPATIENT)
Dept: SURGERY | Age: 78
End: 2023-02-17

## 2023-02-17 NOTE — PROGRESS NOTES
Cardiac clearance received via fax from [facility], placed in inbox for provider to review.

## 2023-03-10 ENCOUNTER — HOSPITAL ENCOUNTER (OUTPATIENT)
Dept: GENERAL RADIOLOGY | Age: 78
Discharge: HOME | End: 2023-03-10
Payer: MEDICARE

## 2023-03-10 ENCOUNTER — TRANSCRIBE ORDER (OUTPATIENT)
Dept: REGISTRATION | Age: 78
End: 2023-03-10

## 2023-03-10 DIAGNOSIS — R06.02 SHORTNESS OF BREATH: Primary | ICD-10-CM

## 2023-03-10 DIAGNOSIS — R06.02 SHORTNESS OF BREATH: ICD-10-CM

## 2023-03-10 PROCEDURE — 71046 X-RAY EXAM CHEST 2 VIEWS: CPT

## 2023-03-21 ENCOUNTER — HOSPITAL ENCOUNTER (OUTPATIENT)
Dept: PHYSICAL THERAPY | Age: 78
Discharge: HOME | End: 2023-03-21
Payer: MEDICARE

## 2023-03-21 VITALS
HEIGHT: 68 IN | DIASTOLIC BLOOD PRESSURE: 80 MMHG | HEART RATE: 77 BPM | SYSTOLIC BLOOD PRESSURE: 174 MMHG | BODY MASS INDEX: 33.04 KG/M2 | WEIGHT: 218 LBS

## 2023-03-21 PROCEDURE — 97161 PT EVAL LOW COMPLEX 20 MIN: CPT

## 2023-03-21 PROCEDURE — 97760 ORTHOTIC MGMT&TRAING 1ST ENC: CPT

## 2023-03-21 PROCEDURE — 97140 MANUAL THERAPY 1/> REGIONS: CPT

## 2023-03-21 NOTE — PROGRESS NOTES
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 [x]   I87.2 Venous Insufficiency   []   I89.0 Lymphedema, other secondary  []   I83.019 Venous Stasis Ulcer LE, Right   []   I89.9 Unspecified Lymphatic Disorder []   I83.029 Venous Stasis Ulcer LE, Left   [x]   R60.9 Swelling not relieved by elevation [x]   Q82.0 Hereditary/ Congenital Lymphedema   []   C50.211 Malignant neoplasm of breast, Right []   G89.3  Cancer associated pain   []   C50.212 Malignant neoplasm of breast, Left []   L03.115 LE Cellulitis, Right   [x]  R26.89 Abnormality of gait []   L03.116 LE Cellulitis, Left                                                           Urban [place]    1945  Page 2 of 2    Physician Order for DME for Diagnosis of Q82.0 primary lymphedema as Listed on Statement of Medical Necessity, Page 1        Recommended Product:  Units Upper Extremity Rt Lt Units Lower Extremity Rt Lt    Circ-Aid, Ready Wrap, Sigvaris Arm    Inelastic binders (Sacha Mu)  []Foot   []Below Knee   []Knee   []Thigh      Circ-Aid Ready Wrap, Sigvaris hand    Kalia Miquel, night use []Full Leg  []Lower Leg      Tribute Arm, night use    Tribute, night use  []Full Leg  []Lower Leg      Kalia Miquel Arm, night use    Malik Sleeve Leg/ Foot, night use      Gradiant Compression Sleeves & Gloves  []Custom [] RM Arm:  []CCL1 []CCL2 []CCL3  []Custom [] RM Glove: []CCL1 []CCL2 []CCL3     4 Gradient Compression Stockings   [x]Custom  []RM Lower Extremity:   []CCL1       [x]CCL2         []CCL3   [x]Knee       []Thigh        []Waist Length x x    Malik sleeve arm w/ hand, night use    Tribute Wrap, night use      Compression Bra          Compression Vest         The above patient was referred for treatment of Lymphedema due to the diagnosis indicated above. Lymphedema is a progressive and chronic condition. It may cause acute infections, muscle atrophy, discomfort, and connective tissue fibrosis with irreversible tissue damage, decreased ROM in the affected limb and diminished functional mobility possibly interfering with independence and ability to work. Recurrent infections and wound complications that commonly occur with Lymphedema often require hospitalization and extensive wound care, thus increasing medical costs. The patient has received complete decongestive therapy which includes manual lymphatic drainage, lymphedema specific exercises, compression bandaging, and hygiene/skin care. Goals of therapy are to reduce the edema and prevent re-accumulation of fluid with its complications. It is medically necessary for this patient to have daytime garments to control this condition. They will need 2 sets (one set to wear and one set to wash for adequate skin care and wearing time). Garments must be replaced every 4-6 months for effectiveness. There are no substitutes available that offer acceptable compression treatment for this Lymphedema patient. If further information is requested, please contact our certified lymphedema therapists at [phone].     [] Asif Archer, PT, DPT, ANANDA  [x] Rock Roque PT, [facility]  [] Rubi Blair, PT, DPT, [facility]      Printed  Provider Name Reji Akhtar Provider Signature  Date    Provider NPI [id number]

## 2023-03-21 NOTE — THERAPY EVALUATION
[address] [facility]  [address] [facility]  [address]  [place] [address]       EVALUATION    NAME: Dilip Molina AGE: 76 y.o. GENDER: female  DATE: 3/21/2023  REFERRING PHYSICIAN: Shirley Monterroso MD  HISTORY AND BACKGROUND:  Patient is a 75 y/o female with history LE lymphedema for greater than 13 years, returning here for evaluation and treatmetn of lymphedema, including completing measurements to replace custom compression garments. Patient reports no changes in medical history since previously seen. Lymphedema history as follows:    Patient received treatment at [facility] prior to TKR in 2008, having undergone phase I CDT. Patient has continued with wear of custom flat knit knee high compression stockings, having them replaced via fitter at [facility], with most recent pair purchased 1-2 years ago. Note garments covering only half of lower legs, with fit likely impaired by recent progression of lymphedema. Oral diuretic has been increased to address increase in limb size, with patient reporting taking 60 mg Furosemide 2x/day. States legs have reduced in size, however, physicians nor patient want high dose of diuretic to continue. Patient was seen at this clinic 2/28/2018-6/1/2018, for phase I CDT, fit with custom flat knit compression stockings and transitioned to home management of lymphedema, with patient last seen 12/15/2022 at this clinic.    Primary Diagnosis:  Primary lymphedema (Q82.0)  Other Treatment Diagnoses:  Abnormality of gait (other abnormalities of gait and mobility R26.89)  Swelling not relieved by elevation ( R60.9 edema)  Venous insufficiency I87.2;   L LE DVT post TKE 2008 (L03.114)  PE (I26.9) 2008  Morbid Obesity (E66.01)  Date of Onset: prior to 2008  Present Symptoms and Functional Limitations: Bilateral LE lymphedema managed with daily wear of custom flat knit compression stockings replaced 16/2022. Patient reports don/doff compression garments with AD. Patient with c/o pain, heaviness, skin tightness, decreased movement bilateral LE secondary to LE lymphedema. Reports she needs new daytime custom flat knit compression stockings. Gait deficits noted, patient using rollator walker for all ambulation. LLIS:  17/68; 25% impairment.   Past Medical History:   Past Medical History:   Diagnosis Date    Anemia, chronic disease 7/31/2012    Arrhythmia 2006, 2008    PALPITATIONS,,CARDIAC CATH    Arthritis     OSTEO    CAD (coronary artery disease)     BLOCKAGES BILATERAL CAROTID, SAW MD 1 MONTH AGO    Chronic pain     LOWER BACK    Depression     Diabetes ([address]     TYPE 2, INSULIN    Hypercholesterolemia     Hypertension     Hypothyroidism 12/29/2011    Macular degeneration 9/18/2015    Osteoporosis     Pulmonary embolism ([address]     Pulmonary hypertension, mild ([address] 2006    Restrictive airway disease     Supraumbilical hernia 13/29/4145    Thromboembolus ([address] 2009    LEFT KNEE AFTER REPLACEMENT    Thyroid disease     HYPOTHYROID    Unspecified sleep apnea     USES CPAP     Past Surgical History:   Procedure Laterality Date    ENDOSCOPY, COLON, DIAGNOSTIC      2003;neg    HX HEART CATHETERIZATION  2006, 2008    2 TIMES    HX TONSILLECTOMY  1951    SD ANESTH,SURGERY OF SHOULDER      SD TOTAL KNEE ARTHROPLASTY  2009    LEFT     Current Medications: Patient reports Diltiazem HCL discontinued  Current Outpatient Medications   Medication Sig    potassium chloride SR (KLOR-CON 10) 10 mEq tablet TAKE 1 TABLET BY MOUTH  TWICE DAILY    cloNIDine HCL (CATAPRES) 0.2 mg tablet TAKE 1 TABLET BY MOUTH 3  TIMES DAILY    levothyroxine (SYNTHROID) 100 mcg tablet TAKE 1 TABLET BY MOUTH  DAILY BEFORE BREAKFAST    atorvastatin (LIPITOR) 20 mg tablet TAKE 1 TABLET BY MOUTH  DAILY    furosemide (LASIX) 40 mg tablet TAKE 1 AND 1/2 TABLETS BY  MOUTH TWICE DAILY    Syringe with Needle, Disp, (BD Luer-Clare Syringe) 3 mL 25 gauge x 1\" syrg Use with insulin as directed. glucose blood VI test strips (Accu-Chek SmartView Test Strip) strip CHECK BLOOD SUGAR 3 TIMES A DAY E11.9    Accu-Chek Fastclix Lancet Drum misc CHECK BLOOD SUGAR 3 TIMES A DAY    Insulin Syringes, Disposable, 1 mL syrg Use with Insulin as directed. colchicine 0.6 mg tablet Take 1 Tab by mouth daily as needed. For gout. cholecalciferol (VITAMIN D3) 25 mcg (1,000 unit) cap Take 1,000 Units by mouth daily. ferrous sulfate (SLOW FE) 142 mg (45 mg iron) ER tablet Take  by mouth Daily (before breakfast). VIT C/AMANDA AC/LUT/COPPER/ZNOX (PRESERVISION LUTEIN PO) Take  by mouth two (2) times a day. hydrALAZINE (APRESOLINE) 50 mg tablet Take 50 mg by mouth three (3) times daily. metolazone (ZAROXOLYN) 2.5 mg tablet Take 2.5 mg by mouth. Twice a week. ranolazine ER (RANEXA) 500 mg SR tablet Take 500 mg by mouth two (2) times a day. diltiazem hcl 360 mg Tb24 Take 360 mg by mouth daily. insulin regular (NOVOLIN R, HUMULIN R) 100 unit/mL injection by SubCUTAneous route. 14 units am, 4 units pm (adjusts prn). insulin NPH (NOVOLIN N, HUMULIN N) 100 unit/mL injection by SubCUTAneous route once. 16 units in am, 8 units pm.    aspirin (ASPIRIN) 325 mg tablet Take 325 mg by mouth daily. VIT B12/INTRINS FACT/FA CMB #2 (INTRINSI B12-FOLATE PO) Take  by mouth daily. No current facility-administered medications for this encounter. Allergies: Allergies   Allergen Reactions    Levaquin [Levofloxacin] Other (comments)     Prolonged QT interval.      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 Patient living with adult daughter. Pt reports long-term history of LE lymphedema, well managed with wear of custom flat knit Juzo 3022SV daytime knee high stockings.  Prior to lymphedema onset 2008, patient without c/o LE edema, lived alone. Living Situation: Lives with adult daughter. Comes to appointment today with her daughter who is visiting from [place]. Trainable Caregiver?: patient states her daughter assists her as needed. Self-care/ADLs: mod I showers, with shower seat/hand held shower. Dresses mod I, additional time. Light meal prep. Pt reports donning knee high garments mod I with donning gloves, removing stockings at home with AD. Mobility: transfers straight cane, mod I, and additional time. Gait short community distances with straight cane, able to walk from front door of clinic building to clinic 100+ feet, additional time, using elevator to get to second floor. Patient does not drive, over 5 years. Daughter obtained chair lift for patient to use up and down stairs with bedroom on second floor. Sleeping Arrangement:  bed   Adaptive Equipment Owned: cane, rollator walker, shower seat, hand held shower, CPAP. Previous Therapy:  Seen at [facility] for phase I CDT over 2008 years ago. Has continued with daily wear of custom flat knit compression garments. Seen at this clinic from 2/28/2018-6/1/2018 phase I CDT, cellulitis episode interrupting treatment. Returned for evaluation 12/2018, 6/25/2019. 7/1/2021,  12/15/2021, 6/14/2022 with condition stable with wear of Juzo 3022SV knee high stockings, daily use of vasopneumatic pump at home. Compression/Lymphedema Equipment:  Knee high Juzo 3022SV custom flat knit stockings for daytime wear, replaced in 6/2022. Garments require replacement. Vasopneumatic pump for home use performing sequence first thing in the morning. SUBJECTIVE:   Patient reports she is doing well with home management of lymphedema with daily wear of custom flat knit stockings. States she would like to order new compression stockings. States she does not perform skin care nightly as recommended.   Asks if she can remove stockings 15 minutes earlier than normal to perform skin care. Patient goals: \"get new stockings. \"    OBJECTIVE DATA SUMMARY:   EXAMINATION/PRESENTATION/DECISION MAKING:   Pain:  6/10 heaviness bilateral LE, skin tightness. Pain Scale 1: Numeric (0 - 10)  Pain Intensity 1: 6  Pain Location 1: Foot, Leg  Skin and Tissue Assessment:  Dermal Status:  [x]   Intact [x]  Dry   []  Tenuous [x] Flaky: Patient instructed long handled sponge can be used for lotion application bilateral LE, using upward strokes, which daughter ordered from [employer] during treatment.   []  Wound/lesion present [x]  Scars: L knee post op   []  Dermatitis    Texture/Consistency:  [x]  Boggy: foot/ankle []  Pitting Edema:    []  Brawny []  Combination   [x]  Fibrotic/Woody: calf region bilateral LE. Pigmentation/Color Change:  []  Normal []  Hemosiderin   []  Red []  Erythematous   [x]  Hyperpigmented []  Hyperlipodermatosclerosis   Anomalies: na  []  Lymphorrhea []  Vesicles   []  Petechiae []  Warty Vercusis   []  Bullae []  Papilloma   Circulatory:  []  BELLE []  Varicosities:   [x]  Pulses: Dorsal pedal palpable []  Vascular studies ruled out DVT in past   [x]  DVT History: post TKR 2008, including PE. No DVT history since    Nails:  []   Normal  [x]   Fungus  Stemmers Sign: R>L, positive      Volumetric Measurements:   Right: 20,284.74 mL 3/2/2023; 11,379.12 mL 6/14/2022; 10,889.96 mL 12/15/2021; 12,176.16 mL 7/1/2021; 11,432. 62 mL 6/25/2019; 11,654. 11 mL 12/2018; 57,117. 16 mL 2/28/2018 Left: 11,376.73 mL; 12,050.09 mL 6/14/2022; 11,553. 96 mL 12/15/2021; 13,176.97 mL 7/1/2021;  11,907.25 mL 6/25/2019.11,749.64 mL 12/2018;  12,324.83 mL 2/28/2018   % Difference: 6.99%, L>R LE    (See scanned graph)  Range of Motion: grossly WFL  Strength: not formally assessed, however, demonstrates performance of functional activitied indep/mod I in room during evaluation. Sensation:  Decreased light touch, bilateral toes.   Mobility:  Bed/Chair Mobility:  Modified independent and Additional time  Transfers:  Modified independent and Additional time    Sitting Balance:  good Standing Balance:  Fair+   Gait:  Modified independent and Additional time, rollator walker,decreased stride length bilateral,  Moderately slow og. Wheelchair Mobility:  na   Endurance: Moderately compromised, gait short community distances with rollator walker, bilateral foot neuropathy Stairs:  Not assessed         Safety:  Patient is alert and oriented:  x4   Safety awareness:  intact   Fall Risk?:  Moderate, ambulates with straight cane. See gait assessment   Patient given written fall prevention handout: Yes   Precautions:  Standard lymphedema precautions to include avoiding blood pressure readings, injections and IVs or other procedures/acts that could lead to broken skin on affected area, and avoiding excessive heat, resistive activity or altitude without compression garment    In compliance with CMSs Claims Based Outcome Reporting, the following G-code set was chosen for this patient based on their primary functional limitation being treated: The outcome measure chosen to determine the severity of the functional limitation was the LLIS with a score of 17/68 which was correlated with the impairment scale.     Other PT/OT Primary Functional Limitations:     - CURRENT STATUS: CJ - 20%-39% impaired, limited or restricted    - GOAL STATUS: CI - 1%-19% impaired, limited or restricted    - D/C STATUS:  ---------------To be determined---------------     Physical Therapy Evaluation Charge Determination   History Examination Presentation Decision-Making   MEDIUM  Complexity : 1-2 comorbidities / personal factors will impact the outcome/ POC  LOW Complexity : 1-2 Standardized tests and measures addressing body structure, function, activity limitation and / or participation in recreation  LOW Complexity : Stable, uncomplicated  Other outcome measures LLIS  MEDIUM      Based on the above components, the patient evaluation is determined to be of the following complexity level: LOW       Evaluation Time: 5499-2200 am    21minutes    TREATMENT PROVIDED:   1. Treatment description: Therapist completed measurements for Samantha 3022SV knee high compression stockings. Toe cap longer on currently owned stockings per patient report. Slant toe measurements completed, minimal adjustment made to improve foot length overall, with garment order sent to . Patient advised to return to clinic for fit of compression stockings to ensure appropriate fit attained. Patient demonstrates good understanding of compression garment management, including daily use of vasopneumatic pump. Home management instructions continued follows regarding compression garment wear;  Compression garment routine:   Apply daytime compression stocking to clean, dry extremity first thing in the morning, EVERY MORNING. Wear compression garments until bedtime, adjusting throughout the day as needed should garment wrinkle or crease. Problem areas can be at joints, and top of garment, ensuring proximal aspect of garment positioned appropriately on extremity. Launder garment nightly either by hand or washing machine in a garment bag or pillowcase. Use mild detergent with NO FABRIC SOFTENER, NO BLEACH, NO WOOLITE. Wash in cool/warm water. Dry garment in dryer on low heat right side out in garment bag or pillowcase. Perform skin care to extremity, cleansing skin daily with soap and water, and using low pH lotion, upward strokes to stimulate lymphatic vessels. Perform vasopneumatic pump use. Daytime compression grments are NOT safe to sleep in, so remove at bedtime. Perform exercise program with compression garments on.   Conditions under which to discontinue home management of lymphedema and seek medical attention:   Signs/Symptoms of infection include:  Fever, flu-like symptoms, pain/redness/warmthin extremity, sometimes with increase in swelling present. Stop wearing your compression garment if this occurs. Call your doctor immediately or go to the Emergency room to address potential infection. Remove compression with changes in circulation, numbness in extremity different from what you may experience when not wearing compression garment, pain, unexplained shortness of breath. Notify clinic if swelling increase noted prior to next scheduled appt. Night time compression may be needed for optimal management of lymphedema. Daily use of vasopneumatic pump as tolerated. Discontinue pump use/compression garment wear based on precautions noted above. Treatment time: 5207 -8805    Minutes: 24    Orthotic management 2    2. Treatment description: MLD performed in clinic, L LE full leg sequence. Patient able to converse throughout treatment, without c/o SOB, pain. Patient and daughter instructed in appropriate application of lotion bilateral LE to address dry/flaky skin. Therapist applied Eucerin lotion thigh/lower leg/foot beginning distal  to proximal each segment, them from foot to groin. Patient's daughter ordered long handled sponge for patient which should be delivered tomorrow. Patient advised to wash sponge after each use. Treatment time: 2090-2011    Minutes: 30  Manual therapy 2        ASSESSMENT:   Anneliese Kenny is a 76 y.o. female who presents with stage II lymphedema, bilateral LE, limb volume discrepancy of 6.99%. Right and Left lower extremities measuring smaller than when patient most recently evaluation on 6/14/2023. R LE measuring reduction of 745.84 mL, L LE reduction of 673.36 mL since 6/14/2022. Patient has been wearing compression stockings fit after 6/2022 appointment, understanding they require replacement to attain optimal management of LE lymphedema.  Order sent to  for replacement custom flat knit knee high compression stockings, with patient to return to clinic in 1 month for fit of stockings. This care is medically necessary due to the infection risk with lymphedema, and to improve functional activities. CDT is necessary to resolve swelling to allow patient to return to wearing normal clothes/footwear, and prevent worsening of symptoms, such as venous stasis ulcerations, infections, or hospitalizations. Patient will be independent with home program strategies to allow improved ADL ability and mobility and to allow patient to return to greatest functional independence. Recommend 4 PT visits over the next 12 weeks to establish appropriate fit of custom flat knit compression stockings, allowing patient to return to phase I CDT. Plan  Manual therapy  Compression garment provision/fit  Lymphedema specific exercise   Lymphedema specific skin care   Goals: 12 weejs  Instruct the patient to be independent with proper skin care, using long handled sponge for lotion application, to prevent future skin breakdown and decrease the potential risk for infections that are associated with Lymphedema. Patient will be independent with a personal lymphedema exercise program to assist with the lymphatic flow and reduce limb volume    3. Patient will have knowledge of the compression options and acquire a safe and appropriate daytime and night time compression system to prevent    re-accumulation of fluid. 4.  Patient or family will be able to don/doff garments independently. Garment system effectiveness will be evaluated prior to discharge for better long-term   management and outcomes. 5.   To transition patient to independent restorative phase of CDT. Patient to bring name and dosage of new medication to next scheduled appointment. Patient has participated in goal setting and agrees to work toward plan of care. Patient was instructed to call if questions or concerns arise.     Thank you for this referral.  Rock Roque, PT, CLT-MARCO   Time Calculation: 79 mins    TREATMENT PLAN EFFECTIVE DATES:   3/21/2023 to 6/16/2023  I have read the above plan of care for [facility]. I certify the above prescribed services are required by this patient and are medically necessary.   The above plan of care has been developed in conjunction with the lymphedema/physical therapist.       Physician Signature: ____________________________________Date:______________

## 2023-03-27 PROBLEM — D69.2 SENILE PURPURA (HCC): Status: ACTIVE | Noted: 2023-03-27

## 2023-03-27 PROBLEM — I77.9 CAROTID DISEASE, BILATERAL (HCC): Status: ACTIVE | Noted: 2023-03-27

## 2023-03-27 PROBLEM — E66.01 SEVERE OBESITY (BMI 35.0-39.9) WITH COMORBIDITY (HCC): Status: RESOLVED | Noted: 2018-04-13 | Resolved: 2023-03-27

## 2023-03-27 PROBLEM — N18.30 CKD (CHRONIC KIDNEY DISEASE) STAGE 3, GFR 30-59 ML/MIN (HCC): Status: RESOLVED | Noted: 2019-06-27 | Resolved: 2023-03-27

## 2023-05-23 ENCOUNTER — OFFICE VISIT (OUTPATIENT)
Age: 78
End: 2023-05-23
Payer: MEDICARE

## 2023-05-23 VITALS
SYSTOLIC BLOOD PRESSURE: 160 MMHG | WEIGHT: 226 LBS | TEMPERATURE: 98 F | DIASTOLIC BLOOD PRESSURE: 80 MMHG | RESPIRATION RATE: 18 BRPM | HEIGHT: 68 IN | OXYGEN SATURATION: 95 % | BODY MASS INDEX: 34.25 KG/M2 | HEART RATE: 64 BPM

## 2023-05-23 DIAGNOSIS — K43.9 VENTRAL HERNIA WITHOUT OBSTRUCTION OR GANGRENE: Primary | ICD-10-CM

## 2023-05-23 PROCEDURE — G8399 PT W/DXA RESULTS DOCUMENT: HCPCS | Performed by: SURGERY

## 2023-05-23 PROCEDURE — 1123F ACP DISCUSS/DSCN MKR DOCD: CPT | Performed by: SURGERY

## 2023-05-23 PROCEDURE — 1090F PRES/ABSN URINE INCON ASSESS: CPT | Performed by: SURGERY

## 2023-05-23 PROCEDURE — G8427 DOCREV CUR MEDS BY ELIG CLIN: HCPCS | Performed by: SURGERY

## 2023-05-23 PROCEDURE — 99212 OFFICE O/P EST SF 10 MIN: CPT | Performed by: SURGERY

## 2023-05-23 PROCEDURE — 3078F DIAST BP <80 MM HG: CPT | Performed by: SURGERY

## 2023-05-23 PROCEDURE — G8417 CALC BMI ABV UP PARAM F/U: HCPCS | Performed by: SURGERY

## 2023-05-23 PROCEDURE — 3077F SYST BP >= 140 MM HG: CPT | Performed by: SURGERY

## 2023-05-23 PROCEDURE — 1036F TOBACCO NON-USER: CPT | Performed by: SURGERY

## 2023-05-23 RX ORDER — LANOLIN ALCOHOL/MO/W.PET/CERES
1000 CREAM (GRAM) TOPICAL DAILY
COMMUNITY

## 2023-05-23 RX ORDER — RIBOFLAVIN (VITAMIN B2) 100 MG
100 TABLET ORAL DAILY
COMMUNITY

## 2023-05-23 RX ORDER — CYANOCOBALAMIN 1000 UG/ML
1000 INJECTION, SOLUTION INTRAMUSCULAR; SUBCUTANEOUS
COMMUNITY
Start: 2023-03-28

## 2023-05-23 RX ORDER — VIT A/VIT C/VIT E/ZINC/COPPER 4296-226
2 CAPSULE ORAL DAILY
COMMUNITY

## 2023-05-23 ASSESSMENT — ENCOUNTER SYMPTOMS
COUGH: 0
NAUSEA: 0
CONSTIPATION: 1
ABDOMINAL PAIN: 1
VOMITING: 0

## 2023-05-23 ASSESSMENT — PATIENT HEALTH QUESTIONNAIRE - PHQ9
SUM OF ALL RESPONSES TO PHQ QUESTIONS 1-9: 0
SUM OF ALL RESPONSES TO PHQ QUESTIONS 1-9: 0
2. FEELING DOWN, DEPRESSED OR HOPELESS: 0
5. POOR APPETITE OR OVEREATING: 0
7. TROUBLE CONCENTRATING ON THINGS, SUCH AS READING THE NEWSPAPER OR WATCHING TELEVISION: 0
SUM OF ALL RESPONSES TO PHQ QUESTIONS 1-9: 0
6. FEELING BAD ABOUT YOURSELF - OR THAT YOU ARE A FAILURE OR HAVE LET YOURSELF OR YOUR FAMILY DOWN: 0
SUM OF ALL RESPONSES TO PHQ9 QUESTIONS 1 & 2: 0
SUM OF ALL RESPONSES TO PHQ QUESTIONS 1-9: 0
8. MOVING OR SPEAKING SO SLOWLY THAT OTHER PEOPLE COULD HAVE NOTICED. OR THE OPPOSITE, BEING SO FIGETY OR RESTLESS THAT YOU HAVE BEEN MOVING AROUND A LOT MORE THAN USUAL: 0
1. LITTLE INTEREST OR PLEASURE IN DOING THINGS: 0
3. TROUBLE FALLING OR STAYING ASLEEP: 0
10. IF YOU CHECKED OFF ANY PROBLEMS, HOW DIFFICULT HAVE THESE PROBLEMS MADE IT FOR YOU TO DO YOUR WORK, TAKE CARE OF THINGS AT HOME, OR GET ALONG WITH OTHER PEOPLE: 0
4. FEELING TIRED OR HAVING LITTLE ENERGY: 0
9. THOUGHTS THAT YOU WOULD BE BETTER OFF DEAD, OR OF HURTING YOURSELF: 0

## 2023-05-23 NOTE — PROGRESS NOTES
Identified patient with two patient identifiers (name and ). Reviewed chart in preparation for visit and have obtained necessary documentation. Tom Brown is a [de-identified] y.o. female  Chief Complaint   Patient presents with    Hernia     BP (!) 160/80 (Site: Left Upper Arm, Position: Sitting, Cuff Size: Large Adult)   Pulse 64   Temp 98 °F (36.7 °C) (Oral)   Resp 18   Ht 5' 8\" (1.727 m)   Wt 226 lb (102.5 kg)   SpO2 95%   BMI 34.36 kg/m²     1. Have you been to the ER, urgent care clinic since your last visit? Hospitalized since your last visit?no    2. Have you seen or consulted any other health care providers outside of the [address] [place] [address] since your last visit? Include any pap smears or colon screening.  no
Hypertension Sister     Hypertension Sister      Social History     Socioeconomic History    Marital status: Unknown     Spouse name: None    Number of children: None    Years of education: None    Highest education level: None   Tobacco Use    Smoking status: Former     Packs/day: 0.25     Types: Cigarettes     Quit date: 1994     Years since quittin.1    Smokeless tobacco: Never   Vaping Use    Vaping Use: Never used   Substance and Sexual Activity    Alcohol use: Not Currently    Drug use: Never    Sexual activity: Not Currently     Review of systems negative except as noted. Review of Systems   Respiratory:  Negative for cough. Gastrointestinal:  Positive for abdominal pain (At site of hernia.) and constipation. Negative for nausea and vomiting. Physical Exam  Vitals reviewed. Constitutional:       General: She is not in acute distress. Appearance: Normal appearance. She is obese. HENT:      Head: Normocephalic and atraumatic. Cardiovascular:      Rate and Rhythm: Normal rate. Heart sounds: Murmur heard. Pulmonary:      Effort: Pulmonary effort is normal.      Breath sounds: Normal breath sounds. Abdominal:      General: There is no distension. Palpations: Abdomen is soft. Tenderness: There is no abdominal tenderness. There is no guarding or rebound. Hernia: A hernia is present. Hernia is present in the ventral area (Above umbilicus in midline. Reducible. ). Neurological:      Mental Status: She is alert. ASSESSMENT and PLAN  Ms. Kaiden Branch is an [de-identified] yo female with a ventral hernia. In view of the findings on H and P, do not believe that there is an urgent indication for hernia repair at this time since the hernia is reducible and not overly symptomatic. Furthermore, Ms. Kaiden Branch does not wish to proceed with surgery unless absolutely necessary. Suggested that she try an abdominal binder. Activity as tolerated. Follow up with Dr. Jean Claude Bates as scheduled.

## 2023-12-22 ENCOUNTER — HOSPITAL ENCOUNTER (OUTPATIENT)
Facility: HOSPITAL | Age: 78
Setting detail: OUTPATIENT SURGERY
Discharge: HOME | End: 2023-12-22
Attending: INTERNAL MEDICINE | Admitting: INTERNAL MEDICINE
Payer: MEDICARE

## 2023-12-22 VITALS
RESPIRATION RATE: 24 BRPM | WEIGHT: 223 LBS | DIASTOLIC BLOOD PRESSURE: 59 MMHG | HEIGHT: 66 IN | OXYGEN SATURATION: 96 % | HEART RATE: 69 BPM | BODY MASS INDEX: 35.84 KG/M2 | TEMPERATURE: 98.8 F | SYSTOLIC BLOOD PRESSURE: 160 MMHG

## 2023-12-22 PROCEDURE — 3600007502: Performed by: INTERNAL MEDICINE

## 2023-12-22 PROCEDURE — 2709999900 HC NON-CHARGEABLE SUPPLY: Performed by: INTERNAL MEDICINE

## 2023-12-22 PROCEDURE — 3700000001 HC ADD 15 MINUTES (ANESTHESIA): Performed by: INTERNAL MEDICINE

## 2023-12-22 PROCEDURE — 7100000011 HC PHASE II RECOVERY - ADDTL 15 MIN: Performed by: INTERNAL MEDICINE

## 2023-12-22 PROCEDURE — 88305 TISSUE EXAM BY PATHOLOGIST: CPT

## 2023-12-22 PROCEDURE — 3700000000 HC ANESTHESIA ATTENDED CARE: Performed by: INTERNAL MEDICINE

## 2023-12-22 PROCEDURE — 3600007512: Performed by: INTERNAL MEDICINE

## 2023-12-22 PROCEDURE — 7100000010 HC PHASE II RECOVERY - FIRST 15 MIN: Performed by: INTERNAL MEDICINE

## 2023-12-22 RX ORDER — SODIUM CHLORIDE 9 MG/ML
25 INJECTION, SOLUTION INTRAVENOUS PRN
Status: DISCONTINUED | OUTPATIENT
Start: 2023-12-22 | End: 2023-12-22 | Stop reason: HOSPADM

## 2023-12-22 RX ORDER — AMLODIPINE BESYLATE 10 MG/1
10 TABLET ORAL DAILY
COMMUNITY
Start: 2022-10-03

## 2023-12-22 RX ORDER — SODIUM CHLORIDE 0.9 % (FLUSH) 0.9 %
5-40 SYRINGE (ML) INJECTION PRN
Status: DISCONTINUED | OUTPATIENT
Start: 2023-12-22 | End: 2023-12-22 | Stop reason: HOSPADM

## 2023-12-22 RX ORDER — SODIUM CHLORIDE 9 MG/ML
INJECTION, SOLUTION INTRAVENOUS CONTINUOUS
Status: DISCONTINUED | OUTPATIENT
Start: 2023-12-22 | End: 2023-12-22 | Stop reason: HOSPADM

## 2023-12-22 RX ORDER — SODIUM CHLORIDE 0.9 % (FLUSH) 0.9 %
5-40 SYRINGE (ML) INJECTION EVERY 12 HOURS SCHEDULED
Status: DISCONTINUED | OUTPATIENT
Start: 2023-12-22 | End: 2023-12-22 | Stop reason: HOSPADM

## 2023-12-22 NOTE — H&P
[address]  [phone]                                History and Physical     NAME: Fran Light   :  1945   MRN:  [id number]     HPI:  The patient was seen and examined.     Past Surgical History:   Procedure Laterality Date    [facility] OF SHOULDER      CARDIAC CATHETERIZATION  ,     2 TIMES    COLONOSCOPY      ;neg    EYE SURGERY      JOINT REPLACEMENT      TONSILLECTOMY      TOTAL KNEE ARTHROPLASTY  2009    LEFT     Past Medical History:   Diagnosis Date    Anemia, chronic disease 2012    Arrhythmia ,     [facility]    Arthritis     OSTEO    CAD (coronary artery disease)     BLOCKAGES BILATERAL CAROTID, SAW MD 1 MONTH AGO    Carotid artery stenosis     Chronic back pain     Chronic kidney disease     Chronic pain     LOWER BACK    Depression     Diabetes ([address])     TYPE 2, INSULIN    Hearing loss     Hypercholesterolemia     Hypertension     Hypothyroidism 2011    Macular degeneration 2015    Neuropathy     Osteoporosis     Pulmonary embolism ([address])     Pulmonary hypertension, mild ([address])     Restrictive airway disease     Supraumbilical hernia     Thromboembolus ([address]) 2009    LEFT KNEE AFTER REPLACEMENT    Thyroid disease     HYPOTHYROID    Unspecified sleep apnea     USES CPAP     Social History     Tobacco Use    Smoking status: Former     Current packs/day: 0.00     Types: Cigarettes     Quit date: 1994     Years since quittin.7    Smokeless tobacco: Never   Vaping Use    Vaping Use: Never used   Substance Use Topics    Alcohol use: Not Currently    Drug use: Never     Allergies   Allergen Reactions    Levofloxacin Other (See Comments)     Prolonged QT interval.     Family History   Problem Relation Age of Onset    Hypertension Sister     Cancer Father         PROSTATE    Stroke Father     Diabetes Father     Heart Disease Father     High Blood Pressure Father     Prostate Cancer Father     Stroke Mother     Heart Disease Mother     Diabetes Mother     High Blood Pressure Mother     Vision Loss Mother     Hypertension Sister     Hypertension Sister      No current facility-administered medications for this encounter. PHYSICAL EXAM:  General: WD, WN. Alert, cooperative, no acute distress    HEENT: NC, Atraumatic. PERRLA, EOMI. Anicteric sclerae. Lungs:  CTA Bilaterally. No Wheezing/Rhonchi/Rales. Heart:  Regular  rhythm,  No murmur, No Rubs, No Gallops  Abdomen: Soft, Non distended, Non tender. +Bowel sounds, no HSM  Extremities: No c/c/e  Neurologic:  CN 2-12 gi, Alert and oriented X 3. No acute neurological distress   Psych:   Good insight. Not anxious nor agitated. The heart, lungs and mental status were satisfactory for the administration of MAC sedation and for the procedure. Mallampati score: 2     The patient was counseled at length about the risks of feliz Covid-19 in the rae-operative and post-operative states including the recovery window of their procedure. The patient was made aware that feliz Covid-19 after a surgical procedure may worsen their prognosis for recovering from the virus and lend to a higher morbidity and or mortality risk. The patient was given the options of postponing their procedure. All of the risks, benefits, and alternatives were discussed. The patient does wish to proceed with the procedure.       Assessment:   Dysphagia,   Iron defi anemia    Plan:   Endoscopic procedure  MAC sedation

## 2023-12-22 NOTE — OP NOTE
[address], [address]  [phone]                           Colonoscopy and EGD Procedure Note      Indications:    Iron deficiency anemia     :  Silvano Lopez MD    Staff: Circulator: Candelario Krueger RN  Endoscopy Technician: Leonidas Coronel     Implants: none    Referring Provider: Martina Rogers MD    Sedation:  MAC anesthesia    Procedure Details:  After informed consent was obtained with all risks and benefits of procedure explained and preoperative exam completed, the patient was taken to the endoscopy suite and placed in the left lateral decubitus position. Upon sequential sedation as per above, a digital rectal exam was performed  And was normal.  The Olympus videocolonoscope  was inserted in the rectum and carefully advanced to the cecum, which was identified by the ileocecal valve and appendiceal orifice. The quality of preparation was inadequate. The colonoscope was slowly withdrawn with careful evaluation between folds. Retroflexion in the rectum was performed and was normal..     Colon Findings:   Rectum: no mucosal lesion appreciated  Grade 2 internal and external hemorrhoid(s); Sigmoid: no mucosal lesion appreciated  Descending Colon: no mucosal lesion appreciated  Transverse Colon: no mucosal lesion appreciated  Ascending Colon: no mucosal lesion appreciated  Cecum: no mucosal lesion appreciated  Terminal Ileum: not intubated      Following sequential administration of sedation as per above, the [id number] gastroscope was inserted into the mouth and advanced under direct vision to second portion of the duodenum. A careful inspection was made as the gastroscope was withdrawn, including a retroflexed view of the proximal stomach; findings and interventions are described below. EGD Findings:  Esophagus: Normal  Stomach: Patchy erythema and congestion was noted in the gastric body and antrum.   Specks of heme were

## 2023-12-22 NOTE — PROGRESS NOTES

## 2024-05-24 ENCOUNTER — TRANSCRIBE ORDERS (OUTPATIENT)
Facility: HOSPITAL | Age: 79
End: 2024-05-24

## 2024-05-24 DIAGNOSIS — I89.0 LYMPHEDEMA: Primary | ICD-10-CM

## 2024-06-07 ENCOUNTER — HOSPITAL ENCOUNTER (OUTPATIENT)
Facility: HOSPITAL | Age: 79
Setting detail: RECURRING SERIES
Discharge: HOME | End: 2024-06-10
Attending: INTERNAL MEDICINE
Payer: MEDICARE

## 2024-06-07 PROCEDURE — 97161 PT EVAL LOW COMPLEX 20 MIN: CPT

## 2024-06-07 PROCEDURE — 97140 MANUAL THERAPY 1/> REGIONS: CPT

## 2024-06-07 NOTE — THERAPY EVALUATION
Statement of Medical Necessity    Page 1 of 2      Cuca Padron 1945 Today's Date: 6/7/2024 FABRIZIO: [employer]   Payor: MEDICARE / Plan: MEDICARE PART A AND B / Product Type: *No Product type* /  ME: TBD  Refills: 2               Diagnosis  []   I97.2 Post-Mastectomy Lymphedema []   I87.2 Venous Insufficiency   [x]   I89.0 Lymphedema, other secondary  []   I83.019 Venous Stasis Ulcer LE, Right   []   I89.9 Unspecified Lymphatic Disorder []   I83.029 Venous Stasis Ulcer LE, Left   []   R60.9 Swelling not relieved by elevation []   Q82.0 Hereditary/ Congenital Lymphedema   []   C50.211 Malignant neoplasm of breast, Right []   G89.3  Cancer associated pain   []   C50.212 Malignant neoplasm of breast, Left []   L03.115 LE Cellulitis, Right   []    []   L03.116 LE Cellulitis, Left                                                           Cuca Padron    1945  Page 2 of 2    Physician Order for DME for Diagnosis of I89.0 as Listed on Statement of Medical Necessity, Page 1        Recommended Product:  Units Upper Extremity Rt Lt Units Lower Extremity Rt Lt    Circ-Aid, Ready Wrap, Sigvaris Arm    Inelastic binders (Circ-Aid, Farrow)  []Foot   []Below Knee   []Knee   []Thigh      Circ-Aid Ready Wrap, Sigvaris hand    Nikos Poncho, night use []Full Leg  []Lower Leg      Tribute Arm, night use    Tribute, night use  []Full Leg  []Lower Leg      Nikos Poncho Arm, night use    Vargas Sleeve Leg/ Foot, night use      Gradiant Compression Sleeves & Gloves  []Custom [] RM Arm:  []CCL1 []CCL2 []CCL3  []Custom [] RM Glove: []CCL1 []CCL2 []CCL3     6 Gradient Compression Stockings   []Custom  []RM Lower Extremity:   []CCL1       []CCL2         []CCL3   []Knee       []Thigh        []Waist Length x x    Vargas sleeve arm w/ hand, night use    Tribute Wrap, night use      Compression Bra          Compression Vest         The above patient was referred for treatment of Lymphedema due to the diagnosis indicated above.  
using upward strokes.  Patient advised recommend application of low pH lotion using upward strokes bilateral LE to reduce risk of breaks in skin, thus reducing risk of infection.  Performed skin care with low pH lotion following manual lymph principles., Skin care products., and Prevention of cellulitis.             54      Total Total          Patient Education: [x] Review HEP    [x]  Patient Education billed concurrently with other procedures   [x] MLD Patient Education Continued education in self MLD technique with bathing and skin care and continue daily use of vasopneumatic pump.  [] Progressed/Changed HEP based on:   [] positioning   [] Kinesiotape   [] Skin care   [] wound care   [] other:   [x] Precautions.  Patient / caregiver re-demonstrated bandaging. [] Yes  [x] No  Compression bandaging/garment precautions reviewed: [x] Yes  [] No        Skin assessment post-treatment (if applicable):    [x]  intact    []  redness- no adverse reaction                 []redness - adverse reaction:         Pain Level at end of session (0-10 scale): 6/10        Plan of Care / Statement of Necessity for Lymphedema Therapy Services      Assessment / key information:  Patient is an 78 y/o female who presents with stage 2 lymphedema R/L LE without trunk involvement. Onset of lymphedema post DVT 2008. Patient presents with custom flat knit compression stockings donned at home prior to appointment. Note fullness bilateral ankle, skin lower legs flaky/dry. Patient reports donning stockings with mod I, using aguayo, with assist of daughter to remove stockings. Patient requires replacement of compression stockings with measurements completed.  Patient will benefit from continuing phase II CDT including compression stocking wear daily, daily use of vasopneumatic pump, remedial LE exercise program, increasing frequency of skin care to daily. Patient requires replacement of custom flat knit compression stockings, with measurements

## 2024-07-29 ENCOUNTER — APPOINTMENT (OUTPATIENT)
Facility: HOSPITAL | Age: 79
End: 2024-07-29
Attending: INTERNAL MEDICINE
Payer: MEDICARE

## 2024-08-19 ENCOUNTER — APPOINTMENT (OUTPATIENT)
Facility: HOSPITAL | Age: 79
End: 2024-08-19
Attending: INTERNAL MEDICINE
Payer: MEDICARE

## 2024-08-26 ENCOUNTER — HOSPITAL ENCOUNTER (OUTPATIENT)
Facility: HOSPITAL | Age: 79
Setting detail: RECURRING SERIES
Discharge: HOME | End: 2024-08-29
Attending: INTERNAL MEDICINE
Payer: MEDICARE

## 2024-08-26 PROCEDURE — 97140 MANUAL THERAPY 1/> REGIONS: CPT

## 2024-08-26 PROCEDURE — 97763 ORTHC/PROSTC MGMT SBSQ ENC: CPT

## 2024-08-26 NOTE — PROGRESS NOTES
PHYSICAL THERAPY - DAILY TREATMENT NOTE (updated 3/23)      Date: 2024          Patient Name:  Cuca Padron :  1945   Medical   Diagnosis:  Lymphedema, not elsewhere classified [I89.0] Treatment Diagnosis:  I89.0     Lymphedema, not elsewhere classified    Referral Source:  JOANNA Gil,* Insurance:   Payor: MEDICARE / Plan: MEDICARE PART A AND B / Product Type: *No Product type* /                     Patient  verified yes     Visit #   Current  / Total 2 3   Time   In / Out 1242 1322   Total Treatment Time 40   Total Timed Codes 24         SUBJECTIVE    Pain Level (0-10 scale): 6/10, LE heaviness negatively impacting LE movement.    Any medication changes, allergies to medications, adverse drug reactions, diagnosis change, or new procedure performed?: [x] No    [] Yes (see summary sheet for update)  Medications: Verified on Patient Summary List    Subjective functional status/changes:     Patient arrives with 3 pair of new custom flat knit compression stockings for fit in clinic.  Reports she continues with balance concerns with ambulation, stating she does have increase in concern regarding falls.  States her daughter has to help her up when she falls as she is unable to get up on her own.  Agreeable to proceeding with treatment today, including fitting stockings.    OBJECTIVE      Therapeutic Procedures:  Tx Min Billable or 1:1 Min (if diff from Tx Min) Procedure, Rationale, Specifics   9  24764 Orthotic Management and Training UE, LE and/or trunk, initial orthotic(s) encounter (timed): assessment and fitting to improve positioning of lower extremity during weight bearing and gait, improve pressure distribution of the plantar aspect of the foot, improve upper extremity performance, improve postural stability to improve patient's ability to progress to PLOF and address remaining functional goals.     Modalities Rationale:   To  prevent worsening of swelling over time to improve  the patient’s ability to perform ADL and IADL skills           Compression garment measuring and fitting    (Unbilled Time) 16  Lower Extremity Compression: Fitted for the following compression products:    LE Bilateral Knee high, Top band silicone border, and Closed Toe    Style: Flat knit    Brand: Brady    Type: Custom: [id number]    Vendor: [employer]     Therapist notes good fit of 3 pair of new stockings today, with each fitting consistently well.        Education: Educated patient in compression garment donning and doffing., Glove use with donning., Daily wear schedule., Daily laundering., Garment lifespan., Return and reordering process (by bringing prior garments into the clinic)., Educated patient to monitor for redness or pressure points on the skin., If new pain occurs they should contact their therapist., and patient advised to notify clinic with any fit concerns regarding stockings including comfort within the next week, patient to wear new stockings only to allow for optimal assessment of new stockings.  Patient advised to return in 6 months for evaluation and completion of measurements for custom compression stockings to allow replacement stockings to be ordered.     Patient/family demonstrated donning and doffing with  mod I, patient donning stockings with large medi aguayo at home.       15  16002 Manual Therapy (timed):  decrease edema to improve patient's ability to progress to PLOF and address remaining functional goals.  The manual therapy interventions were performed at a separate and distinct time from the therapeutic activities interventions . (see flow sheet as applicable)     Details if applicable:  Completed bilateral LE limb volume measurements with results noted below.   24     Total Total       Skin assessment post-treatment (if applicable):    [x]  intact    []  redness- no adverse reaction                 []redness - adverse reaction:            Patient Education: [x] Review HEP   [Dear  ___] : Dear  [unfilled], [Courtesy Letter:] : I had the pleasure of seeing your patient, [unfilled], in my office today. [Please see my note below.] : Please see my note below. [Consult Closing:] : Thank you very much for allowing me to participate in the care of this patient.  If you have any questions, please do not hesitate to contact me. [Sincerely,] : Sincerely, [FreeTextEntry2] : Desmond Coyle M.D.\par [address] [place] [address]\par [place] [id number]\par

## 2024-09-24 PROBLEM — N18.4 CHRONIC KIDNEY DISEASE, STAGE 4 (SEVERE) (HCC): Status: ACTIVE | Noted: 2024-09-24

## 2024-09-24 PROBLEM — D63.1 ANEMIA IN CHRONIC KIDNEY DISEASE (CODE): Status: ACTIVE | Noted: 2024-09-24

## 2024-09-30 ENCOUNTER — HOSPITAL ENCOUNTER (OUTPATIENT)
Facility: HOSPITAL | Age: 79
Setting detail: INFUSION SERIES
Discharge: HOME | End: 2024-09-30
Payer: MEDICARE

## 2024-09-30 VITALS
WEIGHT: 206.8 LBS | RESPIRATION RATE: 18 BRPM | HEART RATE: 86 BPM | DIASTOLIC BLOOD PRESSURE: 71 MMHG | SYSTOLIC BLOOD PRESSURE: 148 MMHG | BODY MASS INDEX: 33.38 KG/M2 | TEMPERATURE: 97.1 F

## 2024-09-30 DIAGNOSIS — N18.4 CHRONIC KIDNEY DISEASE, STAGE 4 (SEVERE) (HCC): ICD-10-CM

## 2024-09-30 DIAGNOSIS — N18.4 CKD (CHRONIC KIDNEY DISEASE) STAGE 4, GFR 15-29 ML/MIN (HCC): ICD-10-CM

## 2024-09-30 DIAGNOSIS — D63.1 ANEMIA IN CHRONIC KIDNEY DISEASE (CODE): ICD-10-CM

## 2024-09-30 DIAGNOSIS — D63.8 ANEMIA, CHRONIC DISEASE: Primary | ICD-10-CM

## 2024-09-30 LAB
ALBUMIN SERPL-MCNC: 3.4 G/DL (ref 3.5–5)
ANION GAP SERPL CALC-SCNC: 6 MMOL/L (ref 2–12)
BUN SERPL-MCNC: 84 MG/DL (ref 6–20)
BUN/CREAT SERPL: 29 (ref 12–20)
CALCIUM SERPL-MCNC: 9.2 MG/DL (ref 8.5–10.1)
CHLORIDE SERPL-SCNC: 102 MMOL/L (ref 97–108)
CO2 SERPL-SCNC: 28 MMOL/L (ref 21–32)
COMMENT:: NORMAL
CREAT SERPL-MCNC: 2.89 MG/DL (ref 0.55–1.02)
GLUCOSE SERPL-MCNC: 199 MG/DL (ref 65–100)
HCT VFR BLD AUTO: 24.9 % (ref 35–47)
HGB BLD-MCNC: 8.3 G/DL (ref 11.5–16)
PHOSPHATE SERPL-MCNC: 4 MG/DL (ref 2.6–4.7)
PHOSPHATE SERPL-MCNC: 4.1 MG/DL (ref 2.6–4.7)
POTASSIUM SERPL-SCNC: 3.7 MMOL/L (ref 3.5–5.1)
SODIUM SERPL-SCNC: 136 MMOL/L (ref 136–145)
SPECIMEN HOLD: NORMAL
URATE SERPL-MCNC: 2.4 MG/DL (ref 2.6–6)

## 2024-09-30 PROCEDURE — 84100 ASSAY OF PHOSPHORUS: CPT

## 2024-09-30 PROCEDURE — 84550 ASSAY OF BLOOD/URIC ACID: CPT

## 2024-09-30 PROCEDURE — 80069 RENAL FUNCTION PANEL: CPT

## 2024-09-30 PROCEDURE — 6360000002 HC RX W HCPCS: Performed by: INTERNAL MEDICINE

## 2024-09-30 PROCEDURE — 96372 THER/PROPH/DIAG INJ SC/IM: CPT

## 2024-09-30 PROCEDURE — 85018 HEMOGLOBIN: CPT

## 2024-09-30 PROCEDURE — 36415 COLL VENOUS BLD VENIPUNCTURE: CPT

## 2024-09-30 PROCEDURE — 85014 HEMATOCRIT: CPT

## 2024-09-30 RX ADMIN — EPOETIN ALFA-EPBX 60000 UNITS: 20000 INJECTION, SOLUTION INTRAVENOUS; SUBCUTANEOUS at 15:00

## 2024-09-30 ASSESSMENT — PAIN SCALES - GENERAL: PAINLEVEL_OUTOF10: 0

## 2024-09-30 NOTE — PROGRESS NOTES
[facility] Progress Note    Date: September 30, 2024        1400: Pt arrived ambulatory with rollator to [facility] for Retacrit in stable condition.  Assessment completed. Labs drawn peripherally and sent for processing.    1455: Labs reviewed. Criteria for treatment was met.    Recent Results (from the past 12 hour(s))   Renal Function Panel    Collection Time: 09/30/24  2:11 PM   Result Value Ref Range    Sodium 136 136 - 145 mmol/L    Potassium 3.7 3.5 - 5.1 mmol/L    Chloride 102 97 - 108 mmol/L    CO2 28 21 - 32 mmol/L    Anion Gap 6 2 - 12 mmol/L    Glucose 199 (H) 65 - 100 mg/dL    BUN 84 (H) 6 - 20 MG/DL    Creatinine 2.89 (H) 0.55 - 1.02 MG/DL    BUN/Creatinine Ratio 29 (H) 12 - 20      Est, Glom Filt Rate 16 (L) >60 ml/min/1.73m2    Calcium 9.2 8.5 - 10.1 MG/DL    Phosphorus 4.0 2.6 - 4.7 MG/DL    Albumin 3.4 (L) 3.5 - 5.0 g/dL   Uric Acid    Collection Time: 09/30/24  2:11 PM   Result Value Ref Range    Uric Acid 2.4 (L) 2.6 - 6.0 MG/DL   Hemoglobin and Hematocrit    Collection Time: 09/30/24  2:11 PM   Result Value Ref Range    Hemoglobin 8.3 (L) 11.5 - 16.0 g/dL    Hematocrit 24.9 (L) 35.0 - 47.0 %   Extra Tubes Hold    Collection Time: 09/30/24  2:11 PM   Result Value Ref Range    Specimen HOld SST     Comment:        Add-on orders for these samples will be processed based on acceptable specimen integrity and analyte stability, which may vary by analyte.   Phosphorus    Collection Time: 09/30/24  2:11 PM   Result Value Ref Range    Phosphorus 4.1 2.6 - 4.7 MG/DL        Patient Vitals for the past 12 hrs:   Temp Pulse Resp BP   09/30/24 1408 97.1 °F (36.2 °C) 86 18 (!) 148/71       Medications Administered         epoetin stacey-epbx (RETACRIT) 60,000 Units combo injection Admin Date  09/30/2024 Action  Given Dose  60,000 Units Route  SubCUTAneous Documented By  Kym Vasquez, RN             1510:  Tolerated treatment well, no adverse reactions noted. D/Cd from [facility] ambulatory and in no distress.  Patient is

## 2024-10-07 ENCOUNTER — HOSPITAL ENCOUNTER (OUTPATIENT)
Facility: HOSPITAL | Age: 79
Setting detail: INFUSION SERIES
Discharge: HOME | End: 2024-10-07
Payer: MEDICARE

## 2024-10-07 VITALS
DIASTOLIC BLOOD PRESSURE: 67 MMHG | TEMPERATURE: 97.3 F | RESPIRATION RATE: 18 BRPM | SYSTOLIC BLOOD PRESSURE: 143 MMHG | HEART RATE: 66 BPM | OXYGEN SATURATION: 100 %

## 2024-10-07 DIAGNOSIS — N18.4 CHRONIC KIDNEY DISEASE, STAGE 4 (SEVERE) (HCC): ICD-10-CM

## 2024-10-07 DIAGNOSIS — N18.4 CKD (CHRONIC KIDNEY DISEASE) STAGE 4, GFR 15-29 ML/MIN (HCC): ICD-10-CM

## 2024-10-07 DIAGNOSIS — D63.1 ANEMIA IN CHRONIC KIDNEY DISEASE (CODE): Primary | ICD-10-CM

## 2024-10-07 LAB
HCT VFR BLD AUTO: 25.4 % (ref 35–47)
HGB BLD-MCNC: 8.1 G/DL (ref 11.5–16)

## 2024-10-07 PROCEDURE — 6360000002 HC RX W HCPCS: Performed by: INTERNAL MEDICINE

## 2024-10-07 PROCEDURE — 85018 HEMOGLOBIN: CPT

## 2024-10-07 PROCEDURE — 96372 THER/PROPH/DIAG INJ SC/IM: CPT

## 2024-10-07 PROCEDURE — 85014 HEMATOCRIT: CPT

## 2024-10-07 PROCEDURE — 36415 COLL VENOUS BLD VENIPUNCTURE: CPT

## 2024-10-07 RX ADMIN — EPOETIN ALFA-EPBX 60000 UNITS: 20000 INJECTION, SOLUTION INTRAVENOUS; SUBCUTANEOUS at 16:24

## 2024-10-07 ASSESSMENT — PAIN SCALES - GENERAL: PAINLEVEL_OUTOF10: 0

## 2024-10-07 NOTE — PROGRESS NOTES
[facility] VISIT NOTE    1530  Pt arrived at [facility] ambulatory and in no distress for Retacrit.  Assessment completed, pt voiced no acute complaints or concerns.    Labs drawn peripherally and sent for processing.     Labs resulted and were within parameters to treat.    Recent Results (from the past 12 hour(s))   Hemoglobin and Hematocrit    Collection Time: 10/07/24  3:40 PM   Result Value Ref Range    Hemoglobin 8.1 (L) 11.5 - 16.0 g/dL    Hematocrit 25.4 (L) 35.0 - 47.0 %     Vitals:    10/07/24 1544   BP: (!) 143/67   Pulse: 66   Resp: 18   Temp: 97.3 °F (36.3 °C)   TempSrc: Temporal   SpO2: 100%     Medications received:  Medications Administered         epoetin stacey-epbx (RETACRIT) 60,000 Units combo injection Admin Date  10/07/2024 Action  Given Dose  60,000 Units Route  SubCUTAneous Documented By  Kaley Justin, RN          Tolerated treatment well, no adverse reaction noted.     1630  D/C'd from [facility] ambulatory and in no distress accompanied by family member. Next appointment is 10/14/24

## 2024-10-14 ENCOUNTER — HOSPITAL ENCOUNTER (OUTPATIENT)
Facility: HOSPITAL | Age: 79
Setting detail: INFUSION SERIES
Discharge: HOME | End: 2024-10-14
Payer: MEDICARE

## 2024-10-14 VITALS
TEMPERATURE: 97.5 F | BODY MASS INDEX: 32.97 KG/M2 | WEIGHT: 204.3 LBS | OXYGEN SATURATION: 97 % | DIASTOLIC BLOOD PRESSURE: 61 MMHG | HEART RATE: 81 BPM | RESPIRATION RATE: 17 BRPM | SYSTOLIC BLOOD PRESSURE: 115 MMHG

## 2024-10-14 DIAGNOSIS — N18.4 CHRONIC KIDNEY DISEASE, STAGE 4 (SEVERE) (HCC): ICD-10-CM

## 2024-10-14 DIAGNOSIS — N18.4 CKD (CHRONIC KIDNEY DISEASE) STAGE 4, GFR 15-29 ML/MIN (HCC): ICD-10-CM

## 2024-10-14 DIAGNOSIS — D63.1 ANEMIA IN CHRONIC KIDNEY DISEASE (CODE): Primary | ICD-10-CM

## 2024-10-14 LAB
HCT VFR BLD AUTO: 29.8 % (ref 35–47)
HGB BLD-MCNC: 9.7 G/DL (ref 11.5–16)

## 2024-10-14 PROCEDURE — 85018 HEMOGLOBIN: CPT

## 2024-10-14 PROCEDURE — 6360000002 HC RX W HCPCS: Performed by: INTERNAL MEDICINE

## 2024-10-14 PROCEDURE — 36415 COLL VENOUS BLD VENIPUNCTURE: CPT

## 2024-10-14 PROCEDURE — 85014 HEMATOCRIT: CPT

## 2024-10-14 PROCEDURE — 96372 THER/PROPH/DIAG INJ SC/IM: CPT

## 2024-10-14 RX ADMIN — EPOETIN ALFA-EPBX 60000 UNITS: 20000 INJECTION, SOLUTION INTRAVENOUS; SUBCUTANEOUS at 15:46

## 2024-10-14 ASSESSMENT — PAIN SCALES - GENERAL: PAINLEVEL_OUTOF10: 0

## 2024-10-14 NOTE — PROGRESS NOTES
[facility] Progress Note    Date: October 14, 2024        1530: Pt arrived ambulatory to [facility] for retacrit injection in stable condition.  Assessment completed. Labs drawn in Left AC and sent for processing. Labs reviewed. Criteria for treatment was met.    Patient Vitals for the past 12 hrs:   Temp Pulse Resp BP SpO2   10/14/24 1543 -- -- -- 115/61 --   10/14/24 1512 97.5 °F (36.4 °C) 81 17 (!) 157/69 97 %     Recent Results (from the past 12 hour(s))   Hemoglobin and Hematocrit    Collection Time: 10/14/24  3:14 PM   Result Value Ref Range    Hemoglobin 9.7 (L) 11.5 - 16.0 g/dL    Hematocrit 29.8 (L) 35.0 - 47.0 %         Medications Administered         epoetin stacey-epbx (RETACRIT) 60,000 Units combo injection Admin Date  10/14/2024 Action  Given Dose  60,000 Units Route  SubCUTAneous Documented By  Nicky Riojas, RN           Retacrit given in Left arm    Ms. Sakshi Padron tolerated the injection, and had no complaints.      [facility] Sakshi [facility]. Patient has no more appointments scheduled at the [facility] at this time.         Future Appointments   Date Time Provider Department Center   10/22/2024  1:00 PM Bay Akhtar MD [facility] AMB         Nicky Riojas, RN, RN  October 14, 2024

## 2025-01-01 ENCOUNTER — HOSPITAL ENCOUNTER (EMERGENCY)
Facility: HOSPITAL | Age: 80
Discharge: HOME | End: 2025-01-01
Attending: STUDENT IN AN ORGANIZED HEALTH CARE EDUCATION/TRAINING PROGRAM
Payer: MEDICARE

## 2025-01-01 VITALS
HEART RATE: 109 BPM | OXYGEN SATURATION: 100 % | BODY MASS INDEX: 32.14 KG/M2 | SYSTOLIC BLOOD PRESSURE: 196 MMHG | DIASTOLIC BLOOD PRESSURE: 74 MMHG | TEMPERATURE: 98.6 F | WEIGHT: 200 LBS | HEIGHT: 66 IN | RESPIRATION RATE: 16 BRPM

## 2025-01-01 DIAGNOSIS — B02.30 HERPES ZOSTER OPHTHALMICUS OF LEFT EYE: Primary | ICD-10-CM

## 2025-01-01 LAB
ALBUMIN SERPL-MCNC: 3.2 G/DL (ref 3.5–5)
ALBUMIN/GLOB SERPL: 0.7 (ref 1.1–2.2)
ALP SERPL-CCNC: 106 U/L (ref 45–117)
ALT SERPL-CCNC: 21 U/L (ref 12–78)
ANION GAP SERPL CALC-SCNC: 13 MMOL/L (ref 2–12)
AST SERPL-CCNC: 27 U/L (ref 15–37)
BASOPHILS # BLD: 0 K/UL (ref 0–0.1)
BASOPHILS NFR BLD: 0 % (ref 0–1)
BILIRUB SERPL-MCNC: 0.5 MG/DL (ref 0.2–1)
BUN SERPL-MCNC: 71 MG/DL (ref 6–20)
BUN/CREAT SERPL: 28 (ref 12–20)
CALCIUM SERPL-MCNC: 9.3 MG/DL (ref 8.5–10.1)
CHLORIDE SERPL-SCNC: 97 MMOL/L (ref 97–108)
CO2 SERPL-SCNC: 29 MMOL/L (ref 21–32)
CREAT SERPL-MCNC: 2.55 MG/DL (ref 0.55–1.02)
DIFFERENTIAL METHOD BLD: ABNORMAL
EOSINOPHIL # BLD: 0.2 K/UL (ref 0–0.4)
EOSINOPHIL NFR BLD: 2 % (ref 0–7)
ERYTHROCYTE [DISTWIDTH] IN BLOOD BY AUTOMATED COUNT: 18.4 % (ref 11.5–14.5)
GLOBULIN SER CALC-MCNC: 4.5 G/DL (ref 2–4)
GLUCOSE SERPL-MCNC: 282 MG/DL (ref 65–100)
HCT VFR BLD AUTO: 27.7 % (ref 35–47)
HGB BLD-MCNC: 9.6 G/DL (ref 11.5–16)
IMM GRANULOCYTES # BLD AUTO: 0.2 K/UL (ref 0–0.04)
IMM GRANULOCYTES NFR BLD AUTO: 2 % (ref 0–0.5)
LYMPHOCYTES # BLD: 1 K/UL (ref 0.8–3.5)
LYMPHOCYTES NFR BLD: 11 % (ref 12–49)
MCH RBC QN AUTO: 29.3 PG (ref 26–34)
MCHC RBC AUTO-ENTMCNC: 34.7 G/DL (ref 30–36.5)
MCV RBC AUTO: 84.5 FL (ref 80–99)
MONOCYTES # BLD: 1 K/UL (ref 0–1)
MONOCYTES NFR BLD: 11 % (ref 5–13)
NEUTS SEG # BLD: 6.8 K/UL (ref 1.8–8)
NEUTS SEG NFR BLD: 74 % (ref 32–75)
NRBC # BLD: 0.03 K/UL (ref 0–0.01)
NRBC BLD-RTO: 0.3 PER 100 WBC
PLATELET # BLD AUTO: 388 K/UL (ref 150–400)
PMV BLD AUTO: 9.9 FL (ref 8.9–12.9)
POTASSIUM SERPL-SCNC: 3.3 MMOL/L (ref 3.5–5.1)
PROT SERPL-MCNC: 7.7 G/DL (ref 6.4–8.2)
RBC # BLD AUTO: 3.28 M/UL (ref 3.8–5.2)
RBC MORPH BLD: ABNORMAL
SODIUM SERPL-SCNC: 139 MMOL/L (ref 136–145)
WBC # BLD AUTO: 9.2 K/UL (ref 3.6–11)

## 2025-01-01 PROCEDURE — 6370000000 HC RX 637 (ALT 250 FOR IP): Performed by: STUDENT IN AN ORGANIZED HEALTH CARE EDUCATION/TRAINING PROGRAM

## 2025-01-01 PROCEDURE — 80053 COMPREHEN METABOLIC PANEL: CPT

## 2025-01-01 PROCEDURE — 36415 COLL VENOUS BLD VENIPUNCTURE: CPT

## 2025-01-01 PROCEDURE — 6360000002 HC RX W HCPCS: Performed by: STUDENT IN AN ORGANIZED HEALTH CARE EDUCATION/TRAINING PROGRAM

## 2025-01-01 PROCEDURE — 99284 EMERGENCY DEPT VISIT MOD MDM: CPT

## 2025-01-01 PROCEDURE — 85025 COMPLETE CBC W/AUTO DIFF WBC: CPT

## 2025-01-01 PROCEDURE — 96374 THER/PROPH/DIAG INJ IV PUSH: CPT

## 2025-01-01 RX ORDER — HYDROCODONE BITARTRATE AND ACETAMINOPHEN 5; 325 MG/1; MG/1
1 TABLET ORAL EVERY 6 HOURS PRN
Qty: 12 TABLET | Refills: 0 | Status: SHIPPED | OUTPATIENT
Start: 2025-01-01 | End: 2025-01-04

## 2025-01-01 RX ORDER — ACETAMINOPHEN 500 MG
1000 TABLET ORAL
Status: COMPLETED | OUTPATIENT
Start: 2025-01-01 | End: 2025-01-01

## 2025-01-01 RX ORDER — ERYTHROMYCIN 5 MG/G
OINTMENT OPHTHALMIC
Qty: 3.5 G | Refills: 0 | Status: SHIPPED | OUTPATIENT
Start: 2025-01-01 | End: 2025-01-11

## 2025-01-01 RX ADMIN — HYDROMORPHONE HYDROCHLORIDE 1 MG: 1 INJECTION, SOLUTION INTRAMUSCULAR; INTRAVENOUS; SUBCUTANEOUS at 13:23

## 2025-01-01 RX ADMIN — ACETAMINOPHEN 1000 MG: 500 TABLET ORAL at 13:21

## 2025-01-01 ASSESSMENT — PAIN DESCRIPTION - ONSET: ONSET: GRADUAL

## 2025-01-01 ASSESSMENT — ENCOUNTER SYMPTOMS: SHORTNESS OF BREATH: 0

## 2025-01-01 ASSESSMENT — PAIN DESCRIPTION - PAIN TYPE
TYPE: ACUTE PAIN
TYPE: ACUTE PAIN

## 2025-01-01 ASSESSMENT — PAIN SCALES - GENERAL
PAINLEVEL_OUTOF10: 3
PAINLEVEL_OUTOF10: 10

## 2025-01-01 ASSESSMENT — PAIN DESCRIPTION - ORIENTATION
ORIENTATION: LEFT
ORIENTATION: LEFT

## 2025-01-01 ASSESSMENT — PAIN DESCRIPTION - FREQUENCY
FREQUENCY: CONTINUOUS
FREQUENCY: CONTINUOUS

## 2025-01-01 ASSESSMENT — PAIN DESCRIPTION - DESCRIPTORS
DESCRIPTORS: ACHING
DESCRIPTORS: ACHING

## 2025-01-01 ASSESSMENT — PAIN DESCRIPTION - LOCATION
LOCATION: EYE
LOCATION: EYE

## 2025-01-01 NOTE — ED TRIAGE NOTES
Pt by W/C to bed 15. Pt is being treated for shingles. Pt with c/o pain in face and left eye. Pt was seen by opthalmology on 12/27/2024 and is on an eye drop as well as Valcyclovir. Pt's eye is encrusted and erythematous. Pt advised to come to ED for pain medication.

## 2025-01-01 NOTE — ED NOTES
Pt discharged to home at this time. All discharge instructions provided to patient. IV removed. No distress noted.

## 2025-01-01 NOTE — ED PROVIDER NOTES
[facility] EMERGENCY DEPT  EMERGENCY DEPARTMENT ENCOUNTER      Pt Name: Cuca Padron  MRN: [id number]  Birthdate 1945  Date of evaluation: 1/1/2025  Provider: Mike Juan MD    CHIEF COMPLAINT       Chief Complaint   Patient presents with    Facial Pain    Eye Drainage         HISTORY OF PRESENT ILLNESS   79-year-old female with history significant for HTN, HLD, DM presents to the ED with chief complaint of left eye pain, redness, and swelling that is worsened over the past several days.  Patient seen 2 days ago by primary care and diagnosed with herpes zoster ophthalmicus.  Was started on Valtrex 1 g daily, also started on lubricating eyedrops, says symptoms have progressively worsened.  She reports associated blurry vision in her left eye.  No fevers, chills, nausea, vomiting, numbness, weakness, or any other symptoms.    The history is provided by the patient.       Review of External Medical Records:     Nursing Notes were reviewed.    REVIEW OF SYSTEMS       Review of Systems   Respiratory:  Negative for shortness of breath.    Cardiovascular:  Negative for chest pain.       Except as noted above the remainder of the review of systems was reviewed and negative.       PAST MEDICAL HISTORY     Past Medical History:   Diagnosis Date    Anemia, chronic disease 7/31/2012    Arrhythmia 2006, 2008    PALPITATIONS,,CARDIAC CATH    Arthritis     OSTEO    CAD (coronary artery disease)     BLOCKAGES BILATERAL CAROTID, SAW MD 1 MONTH AGO    Carotid artery stenosis     Chronic back pain     Chronic kidney disease     Chronic pain     LOWER BACK    Depression     Diabetes (HCC)     TYPE 2, INSULIN    Hearing loss     Hypercholesterolemia     Hypertension     Hypothyroidism 12/29/2011    Macular degeneration 9/18/2015    Neuropathy     Osteoporosis     Pulmonary embolism (HCC)     Pulmonary hypertension, mild (HCC) 2006    Restrictive airway disease     Supraumbilical hernia 12/12/2022    Thromboembolus ([facility])

## 2025-01-07 ENCOUNTER — HOSPITAL ENCOUNTER (INPATIENT)
Facility: HOSPITAL | Age: 80
LOS: 7 days | Discharge: HOME | DRG: 309 | End: 2025-01-15
Attending: STUDENT IN AN ORGANIZED HEALTH CARE EDUCATION/TRAINING PROGRAM | Admitting: INTERNAL MEDICINE
Payer: MEDICARE

## 2025-01-07 DIAGNOSIS — S09.90XA INJURY OF HEAD, INITIAL ENCOUNTER: Primary | ICD-10-CM

## 2025-01-07 DIAGNOSIS — B02.30 HERPES ZOSTER WITH OPHTHALMIC COMPLICATION, UNSPECIFIED HERPES ZOSTER EYE DISEASE: ICD-10-CM

## 2025-01-07 DIAGNOSIS — R79.89 ELEVATED TROPONIN: ICD-10-CM

## 2025-01-07 DIAGNOSIS — I48.0 PAROXYSMAL ATRIAL FIBRILLATION (HCC): ICD-10-CM
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
## 2025-01-07 DIAGNOSIS — I48.92 ATRIAL FLUTTER WITH RAPID VENTRICULAR RESPONSE (HCC): ICD-10-CM

## 2025-01-07 DIAGNOSIS — B02.30 OPHTHALMIC HERPES ZOSTER: ICD-10-CM

## 2025-01-07 PROCEDURE — 99285 EMERGENCY DEPT VISIT HI MDM: CPT

## 2025-01-08 ENCOUNTER — APPOINTMENT (OUTPATIENT)
Facility: HOSPITAL | Age: 80
DRG: 309 | End: 2025-01-08
Attending: HOSPITALIST
Payer: MEDICARE

## 2025-01-08 ENCOUNTER — APPOINTMENT (OUTPATIENT)
Facility: HOSPITAL | Age: 80
DRG: 309 | End: 2025-01-08
Payer: MEDICARE

## 2025-01-08 PROBLEM — I48.4 ATYPICAL ATRIAL FLUTTER (HCC): Status: ACTIVE | Noted: 2025-01-08

## 2025-01-08 PROBLEM — I05.0 SEVERE MITRAL VALVE STENOSIS: Status: ACTIVE | Noted: 2025-01-08

## 2025-01-08 PROBLEM — B02.30 OPHTHALMIC HERPES ZOSTER: Status: ACTIVE | Noted: 2025-01-08

## 2025-01-08 PROBLEM — S09.90XA HEAD INJURY: Status: ACTIVE | Noted: 2025-01-08

## 2025-01-08 PROBLEM — I48.92 ATRIAL FLUTTER WITH RAPID VENTRICULAR RESPONSE (HCC): Status: ACTIVE | Noted: 2025-01-08

## 2025-01-08 LAB
ALBUMIN SERPL-MCNC: 3.3 G/DL (ref 3.5–5)
ALBUMIN/GLOB SERPL: 0.8 (ref 1.1–2.2)
ALP SERPL-CCNC: 98 U/L (ref 45–117)
ALT SERPL-CCNC: 25 U/L (ref 12–78)
ANION GAP SERPL CALC-SCNC: 12 MMOL/L (ref 2–12)
APPEARANCE UR: CLEAR
APTT PPP: 29 SEC (ref 22.1–31)
AST SERPL-CCNC: 29 U/L (ref 15–37)
BACTERIA URNS QL MICRO: NEGATIVE /HPF
BASOPHILS # BLD: 0.06 K/UL (ref 0–0.1)
BASOPHILS NFR BLD: 0.5 % (ref 0–1)
BILIRUB SERPL-MCNC: 0.6 MG/DL (ref 0.2–1)
BILIRUB UR QL: NEGATIVE
BUN SERPL-MCNC: 63 MG/DL (ref 6–20)
BUN/CREAT SERPL: 24 (ref 12–20)
CALCIUM SERPL-MCNC: 9.6 MG/DL (ref 8.5–10.1)
CHLORIDE SERPL-SCNC: 96 MMOL/L (ref 97–108)
CO2 SERPL-SCNC: 29 MMOL/L (ref 21–32)
COLOR UR: ABNORMAL
COMMENT:: NORMAL
CREAT SERPL-MCNC: 2.59 MG/DL (ref 0.55–1.02)
DIFFERENTIAL METHOD BLD: ABNORMAL
ECHO AO ASC DIAM: 2.6 CM
ECHO AO ASCENDING AORTA INDEX: 1.3 CM/M2
ECHO AO ROOT DIAM: 3.3 CM
ECHO AO ROOT INDEX: 1.65 CM/M2
ECHO AV AREA PEAK VELOCITY: 1.5 CM2
ECHO AV AREA VTI: 1.3 CM2
ECHO AV AREA/BSA PEAK VELOCITY: 0.8 CM2/M2
ECHO AV AREA/BSA VTI: 0.7 CM2/M2
ECHO AV MEAN GRADIENT: 16 MMHG
ECHO AV MEAN VELOCITY: 1.9 M/S
ECHO AV PEAK GRADIENT: 25 MMHG
ECHO AV PEAK VELOCITY: 2.5 M/S
ECHO AV VELOCITY RATIO: 0.52
ECHO AV VTI: 45 CM
ECHO BSA: 2.06 M2
ECHO LA DIAMETER INDEX: 2.1 CM/M2
ECHO LA DIAMETER: 4.2 CM
ECHO LA TO AORTIC ROOT RATIO: 1.27
ECHO LA VOL A-L A2C: 97 ML (ref 22–52)
ECHO LA VOL A-L A4C: 117 ML (ref 22–52)
ECHO LA VOL BP: 102 ML (ref 22–52)
ECHO LA VOL MOD A2C: 91 ML (ref 22–52)
ECHO LA VOL MOD A4C: 105 ML (ref 22–52)
ECHO LA VOL/BSA BIPLANE: 51 ML/M2 (ref 16–34)
ECHO LA VOLUME AREA LENGTH: 111 ML
ECHO LA VOLUME INDEX A-L A2C: 49 ML/M2 (ref 16–34)
ECHO LA VOLUME INDEX A-L A4C: 59 ML/M2 (ref 16–34)
ECHO LA VOLUME INDEX AREA LENGTH: 56 ML/M2 (ref 16–34)
ECHO LA VOLUME INDEX MOD A2C: 46 ML/M2 (ref 16–34)
ECHO LA VOLUME INDEX MOD A4C: 53 ML/M2 (ref 16–34)
ECHO LV E' LATERAL VELOCITY: 3.55 CM/S
ECHO LV E' SEPTAL VELOCITY: 3.84 CM/S
ECHO LV EDV A2C: 29 ML
ECHO LV EDV NDEX A2C: 15 ML/M2
ECHO LV EF PHYSICIAN: 60 %
ECHO LV EJECTION FRACTION A2C: 67 %
ECHO LV ESV A2C: 9 ML
ECHO LV ESV INDEX A2C: 5 ML/M2
ECHO LV FRACTIONAL SHORTENING: 23 % (ref 28–44)
ECHO LV INTERNAL DIMENSION DIASTOLE INDEX: 1.5 CM/M2
ECHO LV INTERNAL DIMENSION DIASTOLIC: 3 CM (ref 3.9–5.3)
ECHO LV INTERNAL DIMENSION SYSTOLIC INDEX: 1.15 CM/M2
ECHO LV INTERNAL DIMENSION SYSTOLIC: 2.3 CM
ECHO LV IVSD: 1.3 CM (ref 0.6–0.9)
ECHO LV MASS 2D: 140.5 G (ref 67–162)
ECHO LV MASS INDEX 2D: 70.2 G/M2 (ref 43–95)
ECHO LV POSTERIOR WALL DIASTOLIC: 1.5 CM (ref 0.6–0.9)
ECHO LV RELATIVE WALL THICKNESS RATIO: 1
ECHO LVOT AREA: 2.8 CM2
ECHO LVOT AV VTI INDEX: 0.46
ECHO LVOT DIAM: 1.9 CM
ECHO LVOT MEAN GRADIENT: 3 MMHG
ECHO LVOT PEAK GRADIENT: 7 MMHG
ECHO LVOT PEAK VELOCITY: 1.3 M/S
ECHO LVOT STROKE VOLUME INDEX: 29.5 ML/M2
ECHO LVOT SV: 58.9 ML
ECHO LVOT VTI: 20.8 CM
ECHO MV A VELOCITY: 1.05 M/S
ECHO MV AREA VTI: 1.4 CM2
ECHO MV E DECELERATION TIME (DT): 130.5 MS
ECHO MV E VELOCITY: 1.45 M/S
ECHO MV E/A RATIO: 1.38
ECHO MV E/E' LATERAL: 40.85
ECHO MV E/E' RATIO (AVERAGED): 39.3
ECHO MV E/E' SEPTAL: 37.76
ECHO MV LVOT VTI INDEX: 2.01
ECHO MV MAX VELOCITY: 2.3 M/S
ECHO MV MEAN GRADIENT: 9 MMHG
ECHO MV MEAN VELOCITY: 1.5 M/S
ECHO MV PEAK GRADIENT: 20 MMHG
ECHO MV REGURGITANT PEAK GRADIENT: 85 MMHG
ECHO MV REGURGITANT PEAK VELOCITY: 4.6 M/S
ECHO MV VTI: 41.8 CM
ECHO PV MAX VELOCITY: 1.2 M/S
ECHO PV PEAK GRADIENT: 6 MMHG
ECHO RV FREE WALL PEAK S': 13 CM/S
ECHO TV REGURGITANT MAX VELOCITY: 2.85 M/S
ECHO TV REGURGITANT PEAK GRADIENT: 32 MMHG
EKG ATRIAL RATE: 260 BPM
EKG ATRIAL RATE: 270 BPM
EKG DIAGNOSIS: NORMAL
EKG DIAGNOSIS: NORMAL
EKG P AXIS: 265 DEGREES
EKG Q-T INTERVAL: 340 MS
EKG Q-T INTERVAL: 348 MS
EKG QRS DURATION: 88 MS
EKG QRS DURATION: 98 MS
EKG QTC CALCULATION (BAZETT): 500 MS
EKG QTC CALCULATION (BAZETT): 522 MS
EKG R AXIS: -43 DEGREES
EKG R AXIS: -50 DEGREES
EKG T AXIS: -86 DEGREES
EKG T AXIS: 172 DEGREES
EKG VENTRICULAR RATE: 130 BPM
EKG VENTRICULAR RATE: 135 BPM
EOSINOPHIL # BLD: 0.48 K/UL (ref 0–0.4)
EOSINOPHIL NFR BLD: 4 % (ref 0–7)
EPITH CASTS URNS QL MICRO: ABNORMAL /LPF
ERYTHROCYTE [DISTWIDTH] IN BLOOD BY AUTOMATED COUNT: 19.8 % (ref 11.5–14.5)
ERYTHROCYTE [DISTWIDTH] IN BLOOD BY AUTOMATED COUNT: 20.3 % (ref 11.5–14.5)
GLOBULIN SER CALC-MCNC: 4.3 G/DL (ref 2–4)
GLUCOSE BLD STRIP.AUTO-MCNC: 180 MG/DL (ref 65–117)
GLUCOSE BLD STRIP.AUTO-MCNC: 195 MG/DL (ref 65–117)
GLUCOSE BLD STRIP.AUTO-MCNC: 226 MG/DL (ref 65–117)
GLUCOSE BLD STRIP.AUTO-MCNC: 249 MG/DL (ref 65–117)
GLUCOSE SERPL-MCNC: 193 MG/DL (ref 65–100)
GLUCOSE UR STRIP.AUTO-MCNC: NEGATIVE MG/DL
HCT VFR BLD AUTO: 26.2 % (ref 35–47)
HCT VFR BLD AUTO: 28.3 % (ref 35–47)
HGB BLD-MCNC: 8.9 G/DL (ref 11.5–16)
HGB BLD-MCNC: 9.8 G/DL (ref 11.5–16)
HGB UR QL STRIP: NEGATIVE
HYALINE CASTS URNS QL MICRO: ABNORMAL /LPF (ref 0–2)
IMM GRANULOCYTES # BLD AUTO: 0.2 K/UL (ref 0–0.04)
IMM GRANULOCYTES NFR BLD AUTO: 1.7 % (ref 0–0.5)
INR PPP: 1 (ref 0.9–1.1)
KETONES UR QL STRIP.AUTO: NEGATIVE MG/DL
LEUKOCYTE ESTERASE UR QL STRIP.AUTO: NEGATIVE
LIPASE SERPL-CCNC: 15 U/L (ref 13–75)
LYMPHOCYTES # BLD: 1.12 K/UL (ref 0.8–3.5)
LYMPHOCYTES NFR BLD: 9.4 % (ref 12–49)
MCH RBC QN AUTO: 29.7 PG (ref 26–34)
MCH RBC QN AUTO: 29.9 PG (ref 26–34)
MCHC RBC AUTO-ENTMCNC: 34 G/DL (ref 30–36.5)
MCHC RBC AUTO-ENTMCNC: 34.6 G/DL (ref 30–36.5)
MCV RBC AUTO: 85.8 FL (ref 80–99)
MCV RBC AUTO: 87.9 FL (ref 80–99)
MONOCYTES # BLD: 1.22 K/UL (ref 0–1)
MONOCYTES NFR BLD: 10.2 % (ref 5–13)
NEUTS SEG # BLD: 8.84 K/UL (ref 1.8–8)
NEUTS SEG NFR BLD: 74.2 % (ref 32–75)
NITRITE UR QL STRIP.AUTO: NEGATIVE
NRBC # BLD: 0 K/UL (ref 0–0.01)
NRBC # BLD: 0.03 K/UL (ref 0–0.01)
NRBC BLD-RTO: 0 PER 100 WBC
NRBC BLD-RTO: 0.3 PER 100 WBC
PH UR STRIP: 5.5 (ref 5–8)
PLATELET # BLD AUTO: 417 K/UL (ref 150–400)
PLATELET # BLD AUTO: 458 K/UL (ref 150–400)
PMV BLD AUTO: 10.3 FL (ref 8.9–12.9)
PMV BLD AUTO: 10.6 FL (ref 8.9–12.9)
POTASSIUM SERPL-SCNC: 3.8 MMOL/L (ref 3.5–5.1)
PROT SERPL-MCNC: 7.6 G/DL (ref 6.4–8.2)
PROT UR STRIP-MCNC: 100 MG/DL
PROTHROMBIN TIME: 10.6 SEC (ref 9–11.1)
RBC # BLD AUTO: 2.98 M/UL (ref 3.8–5.2)
RBC # BLD AUTO: 3.3 M/UL (ref 3.8–5.2)
RBC #/AREA URNS HPF: ABNORMAL /HPF (ref 0–5)
SERVICE CMNT-IMP: ABNORMAL
SODIUM SERPL-SCNC: 137 MMOL/L (ref 136–145)
SP GR UR REFRACTOMETRY: 1.01 (ref 1–1.03)
SPECIMEN HOLD: NORMAL
THERAPEUTIC RANGE: NORMAL SECS (ref 58–77)
TROPONIN I SERPL HS-MCNC: 54 NG/L (ref 0–51)
UFH PPP CHRO-ACNC: 1.3 IU/ML
URINE CULTURE IF INDICATED: ABNORMAL
UROBILINOGEN UR QL STRIP.AUTO: 1 EU/DL (ref 0.2–1)
WBC # BLD AUTO: 10.8 K/UL (ref 3.6–11)
WBC # BLD AUTO: 11.9 K/UL (ref 3.6–11)
WBC URNS QL MICRO: ABNORMAL /HPF (ref 0–4)

## 2025-01-08 PROCEDURE — 80053 COMPREHEN METABOLIC PANEL: CPT

## 2025-01-08 PROCEDURE — 82962 GLUCOSE BLOOD TEST: CPT

## 2025-01-08 PROCEDURE — 71250 CT THORAX DX C-: CPT

## 2025-01-08 PROCEDURE — 2000000000 HC ICU R&B

## 2025-01-08 PROCEDURE — 93010 ELECTROCARDIOGRAM REPORT: CPT | Performed by: SPECIALIST

## 2025-01-08 PROCEDURE — 2500000003 HC RX 250 WO HCPCS: Performed by: STUDENT IN AN ORGANIZED HEALTH CARE EDUCATION/TRAINING PROGRAM

## 2025-01-08 PROCEDURE — 99223 1ST HOSP IP/OBS HIGH 75: CPT | Performed by: HOSPITALIST

## 2025-01-08 PROCEDURE — 6370000000 HC RX 637 (ALT 250 FOR IP): Performed by: INTERNAL MEDICINE

## 2025-01-08 PROCEDURE — 6370000000 HC RX 637 (ALT 250 FOR IP)

## 2025-01-08 PROCEDURE — 2580000003 HC RX 258: Performed by: STUDENT IN AN ORGANIZED HEALTH CARE EDUCATION/TRAINING PROGRAM

## 2025-01-08 PROCEDURE — 2500000003 HC RX 250 WO HCPCS: Performed by: INTERNAL MEDICINE

## 2025-01-08 PROCEDURE — 6370000000 HC RX 637 (ALT 250 FOR IP): Performed by: HOSPITALIST

## 2025-01-08 PROCEDURE — 51798 US URINE CAPACITY MEASURE: CPT

## 2025-01-08 PROCEDURE — 93306 TTE W/DOPPLER COMPLETE: CPT

## 2025-01-08 PROCEDURE — 96365 THER/PROPH/DIAG IV INF INIT: CPT

## 2025-01-08 PROCEDURE — 72125 CT NECK SPINE W/O DYE: CPT

## 2025-01-08 PROCEDURE — 85610 PROTHROMBIN TIME: CPT

## 2025-01-08 PROCEDURE — 93005 ELECTROCARDIOGRAM TRACING: CPT | Performed by: STUDENT IN AN ORGANIZED HEALTH CARE EDUCATION/TRAINING PROGRAM

## 2025-01-08 PROCEDURE — 96376 TX/PRO/DX INJ SAME DRUG ADON: CPT

## 2025-01-08 PROCEDURE — 6360000002 HC RX W HCPCS: Performed by: SPECIALIST

## 2025-01-08 PROCEDURE — 6370000000 HC RX 637 (ALT 250 FOR IP): Performed by: STUDENT IN AN ORGANIZED HEALTH CARE EDUCATION/TRAINING PROGRAM

## 2025-01-08 PROCEDURE — 85025 COMPLETE CBC W/AUTO DIFF WBC: CPT

## 2025-01-08 PROCEDURE — 93306 TTE W/DOPPLER COMPLETE: CPT | Performed by: HOSPITALIST

## 2025-01-08 PROCEDURE — 36415 COLL VENOUS BLD VENIPUNCTURE: CPT

## 2025-01-08 PROCEDURE — 85730 THROMBOPLASTIN TIME PARTIAL: CPT

## 2025-01-08 PROCEDURE — 93005 ELECTROCARDIOGRAM TRACING: CPT | Performed by: HOSPITALIST

## 2025-01-08 PROCEDURE — 70450 CT HEAD/BRAIN W/O DYE: CPT

## 2025-01-08 PROCEDURE — 94761 N-INVAS EAR/PLS OXIMETRY MLT: CPT

## 2025-01-08 PROCEDURE — 83690 ASSAY OF LIPASE: CPT

## 2025-01-08 PROCEDURE — 85520 HEPARIN ASSAY: CPT

## 2025-01-08 PROCEDURE — 51701 INSERT BLADDER CATHETER: CPT

## 2025-01-08 PROCEDURE — 81001 URINALYSIS AUTO W/SCOPE: CPT

## 2025-01-08 PROCEDURE — 85027 COMPLETE CBC AUTOMATED: CPT

## 2025-01-08 PROCEDURE — 84484 ASSAY OF TROPONIN QUANT: CPT

## 2025-01-08 RX ORDER — ACETAMINOPHEN 325 MG/1
650 TABLET ORAL EVERY 6 HOURS PRN
Status: DISCONTINUED | OUTPATIENT
Start: 2025-01-08 | End: 2025-01-15 | Stop reason: HOSPADM

## 2025-01-08 RX ORDER — HEPARIN SODIUM 1000 [USP'U]/ML
4000 INJECTION, SOLUTION INTRAVENOUS; SUBCUTANEOUS PRN
Status: DISCONTINUED | OUTPATIENT
Start: 2025-01-08 | End: 2025-01-08

## 2025-01-08 RX ORDER — METOPROLOL TARTRATE 1 MG/ML
2.5 INJECTION, SOLUTION INTRAVENOUS ONCE
Status: COMPLETED | OUTPATIENT
Start: 2025-01-08 | End: 2025-01-08

## 2025-01-08 RX ORDER — SODIUM CHLORIDE 0.9 % (FLUSH) 0.9 %
5-40 SYRINGE (ML) INJECTION PRN
Status: DISCONTINUED | OUTPATIENT
Start: 2025-01-08 | End: 2025-01-15 | Stop reason: HOSPADM

## 2025-01-08 RX ORDER — HEPARIN SODIUM 10000 [USP'U]/100ML
5-30 INJECTION, SOLUTION INTRAVENOUS CONTINUOUS
Status: DISCONTINUED | OUTPATIENT
Start: 2025-01-08 | End: 2025-01-08

## 2025-01-08 RX ORDER — RANOLAZINE 500 MG/1
500 TABLET, EXTENDED RELEASE ORAL DAILY
Status: DISCONTINUED | OUTPATIENT
Start: 2025-01-08 | End: 2025-01-15 | Stop reason: HOSPADM

## 2025-01-08 RX ORDER — ONDANSETRON 4 MG/1
4 TABLET, ORALLY DISINTEGRATING ORAL EVERY 8 HOURS PRN
Status: DISCONTINUED | OUTPATIENT
Start: 2025-01-08 | End: 2025-01-15 | Stop reason: HOSPADM

## 2025-01-08 RX ORDER — ACETAMINOPHEN 325 MG/1
650 TABLET ORAL
Status: COMPLETED | OUTPATIENT
Start: 2025-01-08 | End: 2025-01-08

## 2025-01-08 RX ORDER — INSULIN LISPRO 100 [IU]/ML
0-4 INJECTION, SOLUTION INTRAVENOUS; SUBCUTANEOUS
Status: DISCONTINUED | OUTPATIENT
Start: 2025-01-08 | End: 2025-01-09

## 2025-01-08 RX ORDER — ASCORBIC ACID 500 MG
250 TABLET ORAL DAILY
Status: DISCONTINUED | OUTPATIENT
Start: 2025-01-08 | End: 2025-01-15 | Stop reason: HOSPADM

## 2025-01-08 RX ORDER — ASPIRIN 81 MG/1
324 TABLET, CHEWABLE ORAL
Status: COMPLETED | OUTPATIENT
Start: 2025-01-08 | End: 2025-01-08

## 2025-01-08 RX ORDER — HEPARIN SODIUM 1000 [USP'U]/ML
4000 INJECTION, SOLUTION INTRAVENOUS; SUBCUTANEOUS ONCE
Status: DISCONTINUED | OUTPATIENT
Start: 2025-01-08 | End: 2025-01-08

## 2025-01-08 RX ORDER — HEPARIN SODIUM 1000 [USP'U]/ML
2000 INJECTION, SOLUTION INTRAVENOUS; SUBCUTANEOUS PRN
Status: DISCONTINUED | OUTPATIENT
Start: 2025-01-08 | End: 2025-01-08

## 2025-01-08 RX ORDER — GABAPENTIN 100 MG/1
100 CAPSULE ORAL 2 TIMES DAILY
Status: DISCONTINUED | OUTPATIENT
Start: 2025-01-08 | End: 2025-01-10

## 2025-01-08 RX ORDER — FERROUS SULFATE 325(65) MG
325 TABLET ORAL
Status: DISCONTINUED | OUTPATIENT
Start: 2025-01-08 | End: 2025-01-15 | Stop reason: HOSPADM

## 2025-01-08 RX ORDER — ATORVASTATIN CALCIUM 20 MG/1
20 TABLET, FILM COATED ORAL DAILY
Status: DISCONTINUED | OUTPATIENT
Start: 2025-01-08 | End: 2025-01-15 | Stop reason: HOSPADM

## 2025-01-08 RX ORDER — RANOLAZINE 500 MG/1
500 TABLET, EXTENDED RELEASE ORAL 2 TIMES DAILY
Status: DISCONTINUED | OUTPATIENT
Start: 2025-01-08 | End: 2025-01-08

## 2025-01-08 RX ORDER — MIDAZOLAM HYDROCHLORIDE 1 MG/ML
0-5 INJECTION, SOLUTION INTRAMUSCULAR; INTRAVENOUS ONCE
Status: COMPLETED | OUTPATIENT
Start: 2025-01-09 | End: 2025-01-09

## 2025-01-08 RX ORDER — ENOXAPARIN SODIUM 100 MG/ML
1 INJECTION SUBCUTANEOUS EVERY 24 HOURS
Status: DISCONTINUED | OUTPATIENT
Start: 2025-01-08 | End: 2025-01-08

## 2025-01-08 RX ORDER — POLYETHYLENE GLYCOL 3350 17 G/17G
17 POWDER, FOR SOLUTION ORAL DAILY PRN
Status: DISCONTINUED | OUTPATIENT
Start: 2025-01-08 | End: 2025-01-12

## 2025-01-08 RX ORDER — ACETAMINOPHEN 650 MG/1
650 SUPPOSITORY RECTAL EVERY 6 HOURS PRN
Status: DISCONTINUED | OUTPATIENT
Start: 2025-01-08 | End: 2025-01-15 | Stop reason: HOSPADM

## 2025-01-08 RX ORDER — FENTANYL CITRATE 50 UG/ML
0-50 INJECTION, SOLUTION INTRAMUSCULAR; INTRAVENOUS ONCE
Status: COMPLETED | OUTPATIENT
Start: 2025-01-09 | End: 2025-01-09

## 2025-01-08 RX ORDER — DILTIAZEM HYDROCHLORIDE 5 MG/ML
10 INJECTION INTRAVENOUS ONCE
Status: COMPLETED | OUTPATIENT
Start: 2025-01-08 | End: 2025-01-08

## 2025-01-08 RX ORDER — HEPARIN SODIUM 1000 [USP'U]/ML
2000 INJECTION, SOLUTION INTRAVENOUS; SUBCUTANEOUS PRN
Status: DISCONTINUED | OUTPATIENT
Start: 2025-01-09 | End: 2025-01-11

## 2025-01-08 RX ORDER — WARFARIN SODIUM 5 MG/1
5 TABLET ORAL ONCE
Status: COMPLETED | OUTPATIENT
Start: 2025-01-08 | End: 2025-01-08

## 2025-01-08 RX ORDER — METOPROLOL TARTRATE 50 MG
50 TABLET ORAL 3 TIMES DAILY
Status: DISCONTINUED | OUTPATIENT
Start: 2025-01-08 | End: 2025-01-09

## 2025-01-08 RX ORDER — DEXTROSE MONOHYDRATE 100 MG/ML
INJECTION, SOLUTION INTRAVENOUS CONTINUOUS PRN
Status: DISCONTINUED | OUTPATIENT
Start: 2025-01-08 | End: 2025-01-15 | Stop reason: HOSPADM

## 2025-01-08 RX ORDER — HEPARIN SODIUM 10000 [USP'U]/100ML
5-30 INJECTION, SOLUTION INTRAVENOUS CONTINUOUS
Status: DISCONTINUED | OUTPATIENT
Start: 2025-01-08 | End: 2025-01-11

## 2025-01-08 RX ORDER — SODIUM CHLORIDE 9 MG/ML
INJECTION, SOLUTION INTRAVENOUS PRN
Status: DISCONTINUED | OUTPATIENT
Start: 2025-01-08 | End: 2025-01-15 | Stop reason: HOSPADM

## 2025-01-08 RX ORDER — LEVOTHYROXINE SODIUM 100 UG/1
100 TABLET ORAL
Status: DISCONTINUED | OUTPATIENT
Start: 2025-01-08 | End: 2025-01-15 | Stop reason: HOSPADM

## 2025-01-08 RX ORDER — HEPARIN SODIUM 1000 [USP'U]/ML
4000 INJECTION, SOLUTION INTRAVENOUS; SUBCUTANEOUS PRN
Status: DISCONTINUED | OUTPATIENT
Start: 2025-01-09 | End: 2025-01-11

## 2025-01-08 RX ORDER — ONDANSETRON 2 MG/ML
4 INJECTION INTRAMUSCULAR; INTRAVENOUS EVERY 6 HOURS PRN
Status: DISCONTINUED | OUTPATIENT
Start: 2025-01-08 | End: 2025-01-15 | Stop reason: HOSPADM

## 2025-01-08 RX ORDER — SODIUM CHLORIDE 0.9 % (FLUSH) 0.9 %
5-40 SYRINGE (ML) INJECTION EVERY 12 HOURS SCHEDULED
Status: DISCONTINUED | OUTPATIENT
Start: 2025-01-08 | End: 2025-01-15 | Stop reason: HOSPADM

## 2025-01-08 RX ADMIN — GABAPENTIN 100 MG: 100 CAPSULE ORAL at 07:58

## 2025-01-08 RX ADMIN — INSULIN LISPRO 1 UNITS: 100 INJECTION, SOLUTION INTRAVENOUS; SUBCUTANEOUS at 12:45

## 2025-01-08 RX ADMIN — ATORVASTATIN CALCIUM 20 MG: 20 TABLET, FILM COATED ORAL at 07:58

## 2025-01-08 RX ADMIN — INSULIN LISPRO 1 UNITS: 100 INJECTION, SOLUTION INTRAVENOUS; SUBCUTANEOUS at 07:58

## 2025-01-08 RX ADMIN — WARFARIN SODIUM 5 MG: 5 TABLET ORAL at 17:38

## 2025-01-08 RX ADMIN — Medication 3 MG: at 21:42

## 2025-01-08 RX ADMIN — SODIUM CHLORIDE, PRESERVATIVE FREE 10 ML: 5 INJECTION INTRAVENOUS at 21:32

## 2025-01-08 RX ADMIN — ACETAMINOPHEN 650 MG: 325 TABLET ORAL at 20:14

## 2025-01-08 RX ADMIN — DILTIAZEM HYDROCHLORIDE 10 MG: 5 INJECTION, SOLUTION INTRAVENOUS at 00:42

## 2025-01-08 RX ADMIN — SODIUM CHLORIDE 15 MG/HR: 900 INJECTION, SOLUTION INTRAVENOUS at 15:04

## 2025-01-08 RX ADMIN — METOPROLOL TARTRATE 50 MG: 50 TABLET, FILM COATED ORAL at 17:34

## 2025-01-08 RX ADMIN — SODIUM CHLORIDE 5 MG/HR: 900 INJECTION, SOLUTION INTRAVENOUS at 00:40

## 2025-01-08 RX ADMIN — METOPROLOL TARTRATE 50 MG: 50 TABLET, FILM COATED ORAL at 21:29

## 2025-01-08 RX ADMIN — ACETAMINOPHEN 650 MG: 325 TABLET ORAL at 01:41

## 2025-01-08 RX ADMIN — HEPARIN SODIUM 11 UNITS/KG/HR: 10000 INJECTION, SOLUTION INTRAVENOUS at 18:47

## 2025-01-08 RX ADMIN — SODIUM CHLORIDE 15 MG/HR: 900 INJECTION, SOLUTION INTRAVENOUS at 07:17

## 2025-01-08 RX ADMIN — OXYCODONE HYDROCHLORIDE AND ACETAMINOPHEN 250 MG: 500 TABLET ORAL at 07:58

## 2025-01-08 RX ADMIN — APIXABAN 5 MG: 5 TABLET, FILM COATED ORAL at 10:21

## 2025-01-08 RX ADMIN — INSULIN LISPRO 1 UNITS: 100 INJECTION, SOLUTION INTRAVENOUS; SUBCUTANEOUS at 17:05

## 2025-01-08 RX ADMIN — FERROUS SULFATE TAB 325 MG (65 MG ELEMENTAL FE) 325 MG: 325 (65 FE) TAB at 17:06

## 2025-01-08 RX ADMIN — SODIUM CHLORIDE, PRESERVATIVE FREE 10 ML: 5 INJECTION INTRAVENOUS at 08:07

## 2025-01-08 RX ADMIN — INSULIN LISPRO 1 UNITS: 100 INJECTION, SOLUTION INTRAVENOUS; SUBCUTANEOUS at 21:29

## 2025-01-08 RX ADMIN — LEVOTHYROXINE SODIUM 100 MCG: 100 TABLET ORAL at 07:58

## 2025-01-08 RX ADMIN — METOPROLOL TARTRATE 2.5 MG: 5 INJECTION INTRAVENOUS at 05:46

## 2025-01-08 RX ADMIN — ASPIRIN 324 MG: 81 TABLET, CHEWABLE ORAL at 01:41

## 2025-01-08 RX ADMIN — ACETAMINOPHEN 650 MG: 325 TABLET ORAL at 10:29

## 2025-01-08 RX ADMIN — GABAPENTIN 100 MG: 100 CAPSULE ORAL at 21:29

## 2025-01-08 ASSESSMENT — PAIN DESCRIPTION - LOCATION
LOCATION: RIB CAGE
LOCATION: FACE
LOCATION: EAR

## 2025-01-08 ASSESSMENT — PAIN - FUNCTIONAL ASSESSMENT: PAIN_FUNCTIONAL_ASSESSMENT: PREVENTS OR INTERFERES WITH MANY ACTIVE NOT PASSIVE ACTIVITIES

## 2025-01-08 ASSESSMENT — PAIN SCALES - GENERAL
PAINLEVEL_OUTOF10: 0
PAINLEVEL_OUTOF10: 7
PAINLEVEL_OUTOF10: 7

## 2025-01-08 ASSESSMENT — PAIN DESCRIPTION - DESCRIPTORS
DESCRIPTORS: ACHING
DESCRIPTORS: DISCOMFORT

## 2025-01-08 ASSESSMENT — PAIN DESCRIPTION - ORIENTATION
ORIENTATION: LEFT
ORIENTATION: RIGHT

## 2025-01-08 ASSESSMENT — PAIN DESCRIPTION - FREQUENCY: FREQUENCY: CONTINUOUS

## 2025-01-08 ASSESSMENT — PAIN DESCRIPTION - PAIN TYPE: TYPE: ACUTE PAIN

## 2025-01-08 ASSESSMENT — PAIN DESCRIPTION - ONSET: ONSET: SUDDEN

## 2025-01-08 NOTE — H&P
[facility]  [address] [place], [place]  [id number] [phone]    Hospital Medicine History and Physical      NAME:       Cuca Padron   :       1945   MRN:      [id number]     Date of service:   2025     Chief  Complaint:  Fall, tachycardia     History Of Presenting Illness:       Ms. Sakshi Padron is a 79 y.o. female who is being admitted for atrial flutter with rapid ventricular response. Ms. Sakshi Padron presented to our Emergency Department today complaining of having fallen at home. She apparently fell in the bathroom and felt right sided abdominal discomfort. She does not recall any LOC. No chest pain but she does get SOB. She says she sees Dr Santana for an arrhythmias but denies being on a blood thinner. In the ED, imaging done was as noted below. She was found to be in atrial flutter and started on a Diltiazem gtt and will be admitted for further management. Her Hx seems limited and will need collaboration. She was nonetheless recently diagnosed with herpes ophthalmicus and is on treatment.     Allergies   Allergen Reactions    Levofloxacin Other (See Comments)     Prolonged QT interval.       Prior to Admission medications    Medication Sig Start Date End Date Taking? Authorizing Provider   HYDROcodone-acetaminophen (NORCO) 5-325 MG per tablet Take 1 tablet by mouth every 6 hours as needed for Pain for up to 14 days. Intended supply: 3 days. Take lowest dose possible to manage pain Max Daily Amount: 4 tablets 25  JOANNA Gil MD   gabapentin (NEURONTIN) 100 MG capsule Take 1 capsule by mouth 2 times daily for 180 days. Max Daily Amount: 200 mg 25  JOANNA Gil MD   erythromycin (ROMYCIN) 5 MG/GM ophthalmic ointment Instill 1 cm ribbon to inside of lower eyelid 3 times a day for 5 days. 25

## 2025-01-08 NOTE — ED NOTES
TRANSFER - OUT REPORT:    Verbal report given to ICU RN on Cuca Padron  being transferred to [facility] ICU for routine progression of patient care       Report consisted of patient's Situation, Background, Assessment and   Recommendations(SBAR).     Information from the following report(s) ED SBAR, MAR, and Cardiac Rhythm A Flutter   was reviewed with the receiving nurse.    Kirksey Fall Assessment:    Presents to emergency department  because of falls (Syncope, seizure, or loss of consciousness): Yes  Age > 70: Yes  Altered Mental Status, Intoxication with alcohol or substance confusion (Disorientation, impaired judgment, poor safety awaremess, or inability to follow instructions): No  Impaired Mobility: Ambulates or transfers with assistive devices or assistance; Unable to ambulate or transer.: No  Nursing Judgement: No          Lines:   Peripheral IV 01/08/25 Left;Proximal;Anterior Forearm (Active)        Opportunity for questions and clarification was provided.      Patient transported with:  Monitor

## 2025-01-08 NOTE — ED PROVIDER NOTES
[facility] EMERGENCY DEPARTMENT  EMERGENCY DEPARTMENT ENCOUNTER      Pt Name: Cuca Padron  MRN: [id number]  Birthdate 1945  Date of evaluation: 1/7/2025  Provider: Bay Weber DO    CHIEF COMPLAINT       Chief Complaint   Patient presents with    Fall    right side pain         HISTORY OF PRESENT ILLNESS   (Location/Symptom, Timing/Onset, Context/Setting, Quality, Duration, Modifying Factors, Severity)  Note limiting factors.   79-year-old female brought in for evaluation of a mechanical fall.  Patient went to the bathroom and fell struck her head and landed on her right side.  She denies any loss conscious.  She had a difficult time seeing where her pain actually is.  She is mildly tachycardic.  No anticoagulant use.            Review of External Medical Records:     Nursing Notes were reviewed.    REVIEW OF SYSTEMS    (2-9 systems for level 4, 10 or more for level 5)     Review of Systems   Constitutional:  Negative for fever.   Respiratory:  Negative for shortness of breath.    Cardiovascular:  Negative for chest pain.   Gastrointestinal:  Positive for abdominal pain.   Musculoskeletal:  Positive for back pain.       Except as noted above the remainder of the review of systems was reviewed and negative.       PAST MEDICAL HISTORY     Past Medical History:   Diagnosis Date    Anemia, chronic disease 7/31/2012    Arrhythmia 2006, 2008    PALPITATIONS,,CARDIAC CATH    Arthritis     OSTEO    CAD (coronary artery disease)     BLOCKAGES BILATERAL CAROTID, SAW MD 1 MONTH AGO    Carotid artery stenosis     Chronic back pain     Chronic kidney disease     Chronic pain     LOWER BACK    Depression     Diabetes (HCC)     TYPE 2, INSULIN    Hearing loss     Hypercholesterolemia     Hypertension     Hypothyroidism 12/29/2011    Macular degeneration 9/18/2015    Neuropathy     Osteoporosis     Pulmonary embolism (HCC)     Pulmonary hypertension, mild (HCC) 2006    Restrictive airway disease

## 2025-01-08 NOTE — ED TRIAGE NOTES
PT arrives via EMS.  EMS states that pt fell in bathroom tonight and the fall was unwitnessed.  PT complains of right side pain.  Pt denies LOC and is not on any blood thinners

## 2025-01-09 ENCOUNTER — APPOINTMENT (OUTPATIENT)
Facility: HOSPITAL | Age: 80
DRG: 309 | End: 2025-01-09
Payer: MEDICARE

## 2025-01-09 LAB
ANION GAP SERPL CALC-SCNC: 8 MMOL/L (ref 2–12)
APTT PPP: 34.6 SEC (ref 22.1–31)
APTT PPP: 60.2 SEC (ref 22.1–31)
APTT PPP: 74.4 SEC (ref 22.1–31)
APTT PPP: >130 SEC (ref 22.1–31)
BASOPHILS # BLD: 0.03 K/UL (ref 0–0.1)
BASOPHILS NFR BLD: 0.3 % (ref 0–1)
BUN SERPL-MCNC: 63 MG/DL (ref 6–20)
BUN/CREAT SERPL: 26 (ref 12–20)
CALCIUM SERPL-MCNC: 9 MG/DL (ref 8.5–10.1)
CHLORIDE SERPL-SCNC: 99 MMOL/L (ref 97–108)
CO2 SERPL-SCNC: 27 MMOL/L (ref 21–32)
CREAT SERPL-MCNC: 2.41 MG/DL (ref 0.55–1.02)
DIFFERENTIAL METHOD BLD: ABNORMAL
ECHO BSA: 2.06 M2
EKG ATRIAL RATE: 246 BPM
EKG DIAGNOSIS: NORMAL
EKG Q-T INTERVAL: 308 MS
EKG QRS DURATION: 90 MS
EKG QTC CALCULATION (BAZETT): 440 MS
EKG R AXIS: -37 DEGREES
EKG T AXIS: 135 DEGREES
EKG VENTRICULAR RATE: 123 BPM
EOSINOPHIL # BLD: 0.79 K/UL (ref 0–0.4)
EOSINOPHIL NFR BLD: 7.8 % (ref 0–7)
ERYTHROCYTE [DISTWIDTH] IN BLOOD BY AUTOMATED COUNT: 20.1 % (ref 11.5–14.5)
EST. AVERAGE GLUCOSE BLD GHB EST-MCNC: 163 MG/DL
FERRITIN SERPL-MCNC: 325 NG/ML (ref 8–252)
GLUCOSE BLD STRIP.AUTO-MCNC: 178 MG/DL (ref 65–117)
GLUCOSE BLD STRIP.AUTO-MCNC: 194 MG/DL (ref 65–117)
GLUCOSE BLD STRIP.AUTO-MCNC: 220 MG/DL (ref 65–117)
GLUCOSE BLD STRIP.AUTO-MCNC: 302 MG/DL (ref 65–117)
GLUCOSE SERPL-MCNC: 202 MG/DL (ref 65–100)
HBA1C MFR BLD: 7.3 % (ref 4–5.6)
HCT VFR BLD AUTO: 26.4 % (ref 35–47)
HGB BLD-MCNC: 9 G/DL (ref 11.5–16)
IMM GRANULOCYTES # BLD AUTO: 0.16 K/UL (ref 0–0.04)
IMM GRANULOCYTES NFR BLD AUTO: 1.6 % (ref 0–0.5)
INR PPP: 1.1 (ref 0.9–1.1)
IRON SATN MFR SERPL: 20 % (ref 20–50)
IRON SERPL-MCNC: 40 UG/DL (ref 35–150)
LYMPHOCYTES # BLD: 1.67 K/UL (ref 0.8–3.5)
LYMPHOCYTES NFR BLD: 16.5 % (ref 12–49)
MAGNESIUM SERPL-MCNC: 1.7 MG/DL (ref 1.6–2.4)
MCH RBC QN AUTO: 29.8 PG (ref 26–34)
MCHC RBC AUTO-ENTMCNC: 34.1 G/DL (ref 30–36.5)
MCV RBC AUTO: 87.4 FL (ref 80–99)
MONOCYTES # BLD: 1.07 K/UL (ref 0–1)
MONOCYTES NFR BLD: 10.6 % (ref 5–13)
NEUTS SEG # BLD: 6.38 K/UL (ref 1.8–8)
NEUTS SEG NFR BLD: 63.2 % (ref 32–75)
NRBC # BLD: 0.02 K/UL (ref 0–0.01)
NRBC BLD-RTO: 0.2 PER 100 WBC
PLATELET # BLD AUTO: 409 K/UL (ref 150–400)
PMV BLD AUTO: 10.1 FL (ref 8.9–12.9)
POTASSIUM SERPL-SCNC: 3.5 MMOL/L (ref 3.5–5.1)
PROTHROMBIN TIME: 10.9 SEC (ref 9–11.1)
RBC # BLD AUTO: 3.02 M/UL (ref 3.8–5.2)
RBC MORPH BLD: ABNORMAL
SERVICE CMNT-IMP: ABNORMAL
SODIUM SERPL-SCNC: 134 MMOL/L (ref 136–145)
THERAPEUTIC RANGE: ABNORMAL SECS (ref 58–77)
TIBC SERPL-MCNC: 203 UG/DL (ref 250–450)
WBC # BLD AUTO: 10.1 K/UL (ref 3.6–11)

## 2025-01-09 PROCEDURE — 2000000000 HC ICU R&B

## 2025-01-09 PROCEDURE — 6370000000 HC RX 637 (ALT 250 FOR IP): Performed by: HOSPITALIST

## 2025-01-09 PROCEDURE — 80048 BASIC METABOLIC PNL TOTAL CA: CPT

## 2025-01-09 PROCEDURE — 85025 COMPLETE CBC W/AUTO DIFF WBC: CPT

## 2025-01-09 PROCEDURE — 6360000002 HC RX W HCPCS: Performed by: NURSE PRACTITIONER

## 2025-01-09 PROCEDURE — 94761 N-INVAS EAR/PLS OXIMETRY MLT: CPT

## 2025-01-09 PROCEDURE — 93312 ECHO TRANSESOPHAGEAL: CPT

## 2025-01-09 PROCEDURE — 6360000002 HC RX W HCPCS: Performed by: SPECIALIST

## 2025-01-09 PROCEDURE — B24BZZ4 ULTRASONOGRAPHY OF HEART WITH AORTA, TRANSESOPHAGEAL: ICD-10-PCS | Performed by: SPECIALIST

## 2025-01-09 PROCEDURE — 93010 ELECTROCARDIOGRAM REPORT: CPT | Performed by: SPECIALIST

## 2025-01-09 PROCEDURE — 85610 PROTHROMBIN TIME: CPT

## 2025-01-09 PROCEDURE — 82728 ASSAY OF FERRITIN: CPT

## 2025-01-09 PROCEDURE — 99221 1ST HOSP IP/OBS SF/LOW 40: CPT

## 2025-01-09 PROCEDURE — 83540 ASSAY OF IRON: CPT

## 2025-01-09 PROCEDURE — 85730 THROMBOPLASTIN TIME PARTIAL: CPT

## 2025-01-09 PROCEDURE — 2500000003 HC RX 250 WO HCPCS: Performed by: STUDENT IN AN ORGANIZED HEALTH CARE EDUCATION/TRAINING PROGRAM

## 2025-01-09 PROCEDURE — 2500000003 HC RX 250 WO HCPCS: Performed by: INTERNAL MEDICINE

## 2025-01-09 PROCEDURE — 6370000000 HC RX 637 (ALT 250 FOR IP): Performed by: STUDENT IN AN ORGANIZED HEALTH CARE EDUCATION/TRAINING PROGRAM

## 2025-01-09 PROCEDURE — 6370000000 HC RX 637 (ALT 250 FOR IP): Performed by: SPECIALIST

## 2025-01-09 PROCEDURE — 82962 GLUCOSE BLOOD TEST: CPT

## 2025-01-09 PROCEDURE — 83550 IRON BINDING TEST: CPT

## 2025-01-09 PROCEDURE — 83735 ASSAY OF MAGNESIUM: CPT

## 2025-01-09 PROCEDURE — 6370000000 HC RX 637 (ALT 250 FOR IP): Performed by: INTERNAL MEDICINE

## 2025-01-09 PROCEDURE — 6370000000 HC RX 637 (ALT 250 FOR IP)

## 2025-01-09 PROCEDURE — 5A2204Z RESTORATION OF CARDIAC RHYTHM, SINGLE: ICD-10-PCS | Performed by: SPECIALIST

## 2025-01-09 PROCEDURE — 93005 ELECTROCARDIOGRAM TRACING: CPT

## 2025-01-09 PROCEDURE — 83036 HEMOGLOBIN GLYCOSYLATED A1C: CPT

## 2025-01-09 PROCEDURE — 6360000002 HC RX W HCPCS: Performed by: INTERNAL MEDICINE

## 2025-01-09 PROCEDURE — 36415 COLL VENOUS BLD VENIPUNCTURE: CPT

## 2025-01-09 PROCEDURE — 2580000003 HC RX 258: Performed by: STUDENT IN AN ORGANIZED HEALTH CARE EDUCATION/TRAINING PROGRAM

## 2025-01-09 RX ORDER — INSULIN LISPRO 100 [IU]/ML
4 INJECTION, SOLUTION INTRAVENOUS; SUBCUTANEOUS
Status: DISCONTINUED | OUTPATIENT
Start: 2025-01-09 | End: 2025-01-10

## 2025-01-09 RX ORDER — LIDOCAINE 50 MG/G
OINTMENT TOPICAL PRN
Status: COMPLETED | OUTPATIENT
Start: 2025-01-09 | End: 2025-01-09

## 2025-01-09 RX ORDER — METOPROLOL TARTRATE 50 MG
50 TABLET ORAL 3 TIMES DAILY
Status: DISCONTINUED | OUTPATIENT
Start: 2025-01-09 | End: 2025-01-09

## 2025-01-09 RX ORDER — INSULIN LISPRO 100 [IU]/ML
0-4 INJECTION, SOLUTION INTRAVENOUS; SUBCUTANEOUS
Status: DISCONTINUED | OUTPATIENT
Start: 2025-01-09 | End: 2025-01-15 | Stop reason: HOSPADM

## 2025-01-09 RX ORDER — LIDOCAINE HYDROCHLORIDE 20 MG/ML
SOLUTION OROPHARYNGEAL PRN
Status: COMPLETED | OUTPATIENT
Start: 2025-01-09 | End: 2025-01-09

## 2025-01-09 RX ORDER — WARFARIN SODIUM 5 MG/1
5 TABLET ORAL ONCE
Status: COMPLETED | OUTPATIENT
Start: 2025-01-09 | End: 2025-01-09

## 2025-01-09 RX ORDER — LIDOCAINE HYDROCHLORIDE 20 MG/ML
JELLY TOPICAL PRN
Status: COMPLETED | OUTPATIENT
Start: 2025-01-09 | End: 2025-01-09

## 2025-01-09 RX ORDER — INSULIN GLARGINE 100 [IU]/ML
12 INJECTION, SOLUTION SUBCUTANEOUS NIGHTLY
Status: DISCONTINUED | OUTPATIENT
Start: 2025-01-09 | End: 2025-01-10

## 2025-01-09 RX ADMIN — SODIUM CHLORIDE 10 MG/HR: 900 INJECTION, SOLUTION INTRAVENOUS at 12:32

## 2025-01-09 RX ADMIN — INSULIN LISPRO 3 UNITS: 100 INJECTION, SOLUTION INTRAVENOUS; SUBCUTANEOUS at 16:41

## 2025-01-09 RX ADMIN — Medication 3 MG: at 21:12

## 2025-01-09 RX ADMIN — ACETAMINOPHEN 650 MG: 325 TABLET ORAL at 21:10

## 2025-01-09 RX ADMIN — HEPARIN SODIUM 4000 UNITS: 1000 INJECTION INTRAVENOUS; SUBCUTANEOUS at 02:36

## 2025-01-09 RX ADMIN — MIDAZOLAM 2 MG: 1 INJECTION INTRAMUSCULAR; INTRAVENOUS at 11:42

## 2025-01-09 RX ADMIN — SODIUM CHLORIDE, PRESERVATIVE FREE 10 ML: 5 INJECTION INTRAVENOUS at 21:13

## 2025-01-09 RX ADMIN — LEVOTHYROXINE SODIUM 100 MCG: 100 TABLET ORAL at 06:52

## 2025-01-09 RX ADMIN — HEPARIN SODIUM 12 UNITS/KG/HR: 10000 INJECTION, SOLUTION INTRAVENOUS at 14:59

## 2025-01-09 RX ADMIN — WARFARIN SODIUM 5 MG: 5 TABLET ORAL at 16:54

## 2025-01-09 RX ADMIN — METOPROLOL TARTRATE 75 MG: 50 TABLET, FILM COATED ORAL at 21:11

## 2025-01-09 RX ADMIN — GABAPENTIN 100 MG: 100 CAPSULE ORAL at 21:12

## 2025-01-09 RX ADMIN — LIDOCAINE HYDROCHLORIDE 15 ML: 20 SOLUTION ORAL at 10:46

## 2025-01-09 RX ADMIN — LIDOCAINE 5 MG: 50 OINTMENT TOPICAL at 10:51

## 2025-01-09 RX ADMIN — METOPROLOL TARTRATE 50 MG: 50 TABLET, FILM COATED ORAL at 05:56

## 2025-01-09 RX ADMIN — LIDOCAINE HYDROCHLORIDE 200 MG: 20 JELLY TOPICAL at 10:52

## 2025-01-09 RX ADMIN — INSULIN LISPRO 4 UNITS: 100 INJECTION, SOLUTION INTRAVENOUS; SUBCUTANEOUS at 16:41

## 2025-01-09 RX ADMIN — ACETAMINOPHEN 650 MG: 325 TABLET ORAL at 06:33

## 2025-01-09 RX ADMIN — FERROUS SULFATE TAB 325 MG (65 MG ELEMENTAL FE) 325 MG: 325 (65 FE) TAB at 15:00

## 2025-01-09 RX ADMIN — INSULIN GLARGINE 12 UNITS: 100 INJECTION, SOLUTION SUBCUTANEOUS at 21:10

## 2025-01-09 RX ADMIN — POLYETHYLENE GLYCOL 3350 17 G: 17 POWDER, FOR SOLUTION ORAL at 12:35

## 2025-01-09 RX ADMIN — ONDANSETRON 4 MG: 2 INJECTION, SOLUTION INTRAMUSCULAR; INTRAVENOUS at 17:48

## 2025-01-09 RX ADMIN — METOPROLOL TARTRATE 50 MG: 50 TABLET, FILM COATED ORAL at 15:00

## 2025-01-09 RX ADMIN — FENTANYL CITRATE 50 MCG: 50 INJECTION INTRAMUSCULAR; INTRAVENOUS at 11:45

## 2025-01-09 ASSESSMENT — PAIN SCALES - GENERAL
PAINLEVEL_OUTOF10: 8
PAINLEVEL_OUTOF10: 6
PAINLEVEL_OUTOF10: 0
PAINLEVEL_OUTOF10: 0

## 2025-01-09 ASSESSMENT — PAIN DESCRIPTION - LOCATION: LOCATION: FACE

## 2025-01-09 NOTE — PROCEDURES
PROCEDURE NOTE  Date: 1/9/2025   Name: Cuca Padron  YOB: 1945    Procedures        Limited CAMRON  Mildly mobile, echodensity noted in IESHA worrisome for thrombus.  DCCV not performed.  Discussed with Pt and family.

## 2025-01-09 NOTE — FLOWSHEET NOTE
Patient tachycardic. EKG w/ atrial flutter. Asymptomatic. Will give AM dose of metoprolol now. Consider restarting dilt gtt as needed. AM labs pending. CAMRON in AM w/ cards. Continue to monitor.

## 2025-01-10 LAB
APTT PPP: 61.9 SEC (ref 22.1–31)
ERYTHROCYTE [DISTWIDTH] IN BLOOD BY AUTOMATED COUNT: 20.4 % (ref 11.5–14.5)
GLUCOSE BLD STRIP.AUTO-MCNC: 190 MG/DL (ref 65–117)
GLUCOSE BLD STRIP.AUTO-MCNC: 194 MG/DL (ref 65–117)
GLUCOSE BLD STRIP.AUTO-MCNC: 202 MG/DL (ref 65–117)
GLUCOSE BLD STRIP.AUTO-MCNC: 225 MG/DL (ref 65–117)
HCT VFR BLD AUTO: 26.8 % (ref 35–47)
HGB BLD-MCNC: 8.9 G/DL (ref 11.5–16)
INR PPP: 1.1 (ref 0.9–1.1)
MCH RBC QN AUTO: 29.5 PG (ref 26–34)
MCHC RBC AUTO-ENTMCNC: 33.2 G/DL (ref 30–36.5)
MCV RBC AUTO: 88.7 FL (ref 80–99)
NRBC # BLD: 0.02 K/UL (ref 0–0.01)
NRBC BLD-RTO: 0.2 PER 100 WBC
PLATELET # BLD AUTO: 402 K/UL (ref 150–400)
PMV BLD AUTO: 9.7 FL (ref 8.9–12.9)
PROTHROMBIN TIME: 10.9 SEC (ref 9–11.1)
RBC # BLD AUTO: 3.02 M/UL (ref 3.8–5.2)
SERVICE CMNT-IMP: ABNORMAL
THERAPEUTIC RANGE: ABNORMAL SECS (ref 58–77)
WBC # BLD AUTO: 9.6 K/UL (ref 3.6–11)

## 2025-01-10 PROCEDURE — 2000000000 HC ICU R&B

## 2025-01-10 PROCEDURE — 6370000000 HC RX 637 (ALT 250 FOR IP)

## 2025-01-10 PROCEDURE — 99233 SBSQ HOSP IP/OBS HIGH 50: CPT | Performed by: HOSPITALIST

## 2025-01-10 PROCEDURE — 82962 GLUCOSE BLOOD TEST: CPT

## 2025-01-10 PROCEDURE — 85730 THROMBOPLASTIN TIME PARTIAL: CPT

## 2025-01-10 PROCEDURE — 94761 N-INVAS EAR/PLS OXIMETRY MLT: CPT

## 2025-01-10 PROCEDURE — 97530 THERAPEUTIC ACTIVITIES: CPT

## 2025-01-10 PROCEDURE — 97535 SELF CARE MNGMENT TRAINING: CPT

## 2025-01-10 PROCEDURE — 2500000003 HC RX 250 WO HCPCS: Performed by: INTERNAL MEDICINE

## 2025-01-10 PROCEDURE — 6370000000 HC RX 637 (ALT 250 FOR IP): Performed by: HOSPITALIST

## 2025-01-10 PROCEDURE — 6370000000 HC RX 637 (ALT 250 FOR IP): Performed by: INTERNAL MEDICINE

## 2025-01-10 PROCEDURE — 6370000000 HC RX 637 (ALT 250 FOR IP): Performed by: STUDENT IN AN ORGANIZED HEALTH CARE EDUCATION/TRAINING PROGRAM

## 2025-01-10 PROCEDURE — 36415 COLL VENOUS BLD VENIPUNCTURE: CPT

## 2025-01-10 PROCEDURE — 97165 OT EVAL LOW COMPLEX 30 MIN: CPT

## 2025-01-10 PROCEDURE — 6360000002 HC RX W HCPCS: Performed by: SPECIALIST

## 2025-01-10 PROCEDURE — 85027 COMPLETE CBC AUTOMATED: CPT

## 2025-01-10 PROCEDURE — 97110 THERAPEUTIC EXERCISES: CPT

## 2025-01-10 PROCEDURE — 85610 PROTHROMBIN TIME: CPT

## 2025-01-10 PROCEDURE — 97162 PT EVAL MOD COMPLEX 30 MIN: CPT

## 2025-01-10 RX ORDER — GABAPENTIN 100 MG/1
200 CAPSULE ORAL 2 TIMES DAILY
Status: DISCONTINUED | OUTPATIENT
Start: 2025-01-10 | End: 2025-01-15 | Stop reason: HOSPADM

## 2025-01-10 RX ORDER — WARFARIN SODIUM 5 MG/1
5 TABLET ORAL
Status: COMPLETED | OUTPATIENT
Start: 2025-01-10 | End: 2025-01-10

## 2025-01-10 RX ORDER — DILTIAZEM HYDROCHLORIDE 30 MG/1
60 TABLET, FILM COATED ORAL EVERY 6 HOURS SCHEDULED
Status: DISCONTINUED | OUTPATIENT
Start: 2025-01-10 | End: 2025-01-10

## 2025-01-10 RX ORDER — INSULIN LISPRO 100 [IU]/ML
5 INJECTION, SOLUTION INTRAVENOUS; SUBCUTANEOUS
Status: DISCONTINUED | OUTPATIENT
Start: 2025-01-10 | End: 2025-01-15 | Stop reason: HOSPADM

## 2025-01-10 RX ORDER — DILTIAZEM HYDROCHLORIDE 30 MG/1
60 TABLET, FILM COATED ORAL EVERY 6 HOURS SCHEDULED
Status: DISCONTINUED | OUTPATIENT
Start: 2025-01-10 | End: 2025-01-11

## 2025-01-10 RX ORDER — INSULIN GLARGINE 100 [IU]/ML
14 INJECTION, SOLUTION SUBCUTANEOUS NIGHTLY
Status: DISCONTINUED | OUTPATIENT
Start: 2025-01-10 | End: 2025-01-15

## 2025-01-10 RX ADMIN — OXYCODONE HYDROCHLORIDE AND ACETAMINOPHEN 250 MG: 500 TABLET ORAL at 08:22

## 2025-01-10 RX ADMIN — INSULIN LISPRO 1 UNITS: 100 INJECTION, SOLUTION INTRAVENOUS; SUBCUTANEOUS at 17:23

## 2025-01-10 RX ADMIN — INSULIN LISPRO 4 UNITS: 100 INJECTION, SOLUTION INTRAVENOUS; SUBCUTANEOUS at 11:56

## 2025-01-10 RX ADMIN — SODIUM CHLORIDE, PRESERVATIVE FREE 10 ML: 5 INJECTION INTRAVENOUS at 08:36

## 2025-01-10 RX ADMIN — INSULIN GLARGINE 14 UNITS: 100 INJECTION, SOLUTION SUBCUTANEOUS at 20:33

## 2025-01-10 RX ADMIN — INSULIN LISPRO 1 UNITS: 100 INJECTION, SOLUTION INTRAVENOUS; SUBCUTANEOUS at 11:56

## 2025-01-10 RX ADMIN — INSULIN LISPRO 4 UNITS: 100 INJECTION, SOLUTION INTRAVENOUS; SUBCUTANEOUS at 08:27

## 2025-01-10 RX ADMIN — ACETAMINOPHEN 650 MG: 325 TABLET ORAL at 18:31

## 2025-01-10 RX ADMIN — INSULIN LISPRO 5 UNITS: 100 INJECTION, SOLUTION INTRAVENOUS; SUBCUTANEOUS at 17:24

## 2025-01-10 RX ADMIN — METOPROLOL TARTRATE 75 MG: 50 TABLET, FILM COATED ORAL at 16:18

## 2025-01-10 RX ADMIN — DILTIAZEM HYDROCHLORIDE 60 MG: 30 TABLET ORAL at 11:57

## 2025-01-10 RX ADMIN — DILTIAZEM HYDROCHLORIDE 60 MG: 30 TABLET ORAL at 17:21

## 2025-01-10 RX ADMIN — ATORVASTATIN CALCIUM 20 MG: 20 TABLET, FILM COATED ORAL at 08:23

## 2025-01-10 RX ADMIN — INSULIN LISPRO 1 UNITS: 100 INJECTION, SOLUTION INTRAVENOUS; SUBCUTANEOUS at 08:27

## 2025-01-10 RX ADMIN — WARFARIN SODIUM 5 MG: 5 TABLET ORAL at 17:30

## 2025-01-10 RX ADMIN — RANOLAZINE 500 MG: 500 TABLET, FILM COATED, EXTENDED RELEASE ORAL at 08:22

## 2025-01-10 RX ADMIN — METOPROLOL TARTRATE 75 MG: 50 TABLET, FILM COATED ORAL at 08:23

## 2025-01-10 RX ADMIN — LEVOTHYROXINE SODIUM 100 MCG: 100 TABLET ORAL at 08:22

## 2025-01-10 RX ADMIN — DILTIAZEM HYDROCHLORIDE 60 MG: 30 TABLET ORAL at 23:20

## 2025-01-10 RX ADMIN — GABAPENTIN 200 MG: 100 CAPSULE ORAL at 20:33

## 2025-01-10 RX ADMIN — POLYETHYLENE GLYCOL 3350 17 G: 17 POWDER, FOR SOLUTION ORAL at 16:26

## 2025-01-10 RX ADMIN — SODIUM CHLORIDE, PRESERVATIVE FREE 10 ML: 5 INJECTION INTRAVENOUS at 20:22

## 2025-01-10 RX ADMIN — INSULIN LISPRO 1 UNITS: 100 INJECTION, SOLUTION INTRAVENOUS; SUBCUTANEOUS at 20:37

## 2025-01-10 RX ADMIN — METOPROLOL TARTRATE 75 MG: 50 TABLET, FILM COATED ORAL at 20:24

## 2025-01-10 RX ADMIN — FERROUS SULFATE TAB 325 MG (65 MG ELEMENTAL FE) 325 MG: 325 (65 FE) TAB at 16:17

## 2025-01-10 RX ADMIN — ACETAMINOPHEN 650 MG: 325 TABLET ORAL at 09:37

## 2025-01-10 RX ADMIN — GABAPENTIN 100 MG: 100 CAPSULE ORAL at 08:30

## 2025-01-10 RX ADMIN — HEPARIN SODIUM 12 UNITS/KG/HR: 10000 INJECTION, SOLUTION INTRAVENOUS at 15:33

## 2025-01-10 ASSESSMENT — PAIN DESCRIPTION - DESCRIPTORS
DESCRIPTORS: ACHING
DESCRIPTORS: DULL
DESCRIPTORS: ACHING

## 2025-01-10 ASSESSMENT — PAIN SCALES - GENERAL
PAINLEVEL_OUTOF10: 8
PAINLEVEL_OUTOF10: 8
PAINLEVEL_OUTOF10: 0
PAINLEVEL_OUTOF10: 3

## 2025-01-10 ASSESSMENT — PAIN DESCRIPTION - LOCATION
LOCATION: LEG
LOCATION: LEG

## 2025-01-11 LAB
ANION GAP SERPL CALC-SCNC: 6 MMOL/L (ref 2–12)
APTT PPP: 63.4 SEC (ref 22.1–31)
BASOPHILS # BLD: 0.04 K/UL (ref 0–0.1)
BASOPHILS NFR BLD: 0.4 % (ref 0–1)
BUN SERPL-MCNC: 69 MG/DL (ref 6–20)
BUN/CREAT SERPL: 28 (ref 12–20)
CALCIUM SERPL-MCNC: 9.4 MG/DL (ref 8.5–10.1)
CHLORIDE SERPL-SCNC: 100 MMOL/L (ref 97–108)
CO2 SERPL-SCNC: 28 MMOL/L (ref 21–32)
CREAT SERPL-MCNC: 2.44 MG/DL (ref 0.55–1.02)
DIFFERENTIAL METHOD BLD: ABNORMAL
EOSINOPHIL # BLD: 0.7 K/UL (ref 0–0.4)
EOSINOPHIL NFR BLD: 7.4 % (ref 0–7)
ERYTHROCYTE [DISTWIDTH] IN BLOOD BY AUTOMATED COUNT: 20.5 % (ref 11.5–14.5)
GLUCOSE BLD STRIP.AUTO-MCNC: 188 MG/DL (ref 65–117)
GLUCOSE BLD STRIP.AUTO-MCNC: 217 MG/DL (ref 65–117)
GLUCOSE BLD STRIP.AUTO-MCNC: 225 MG/DL (ref 65–117)
GLUCOSE BLD STRIP.AUTO-MCNC: 292 MG/DL (ref 65–117)
GLUCOSE SERPL-MCNC: 147 MG/DL (ref 65–100)
HCT VFR BLD AUTO: 26 % (ref 35–47)
HGB BLD-MCNC: 8.5 G/DL (ref 11.5–16)
IMM GRANULOCYTES # BLD AUTO: 0.14 K/UL (ref 0–0.04)
IMM GRANULOCYTES NFR BLD AUTO: 1.5 % (ref 0–0.5)
INR PPP: 1.1 (ref 0.9–1.1)
LYMPHOCYTES # BLD: 1.86 K/UL (ref 0.8–3.5)
LYMPHOCYTES NFR BLD: 19.6 % (ref 12–49)
MCH RBC QN AUTO: 29.3 PG (ref 26–34)
MCHC RBC AUTO-ENTMCNC: 32.7 G/DL (ref 30–36.5)
MCV RBC AUTO: 89.7 FL (ref 80–99)
MONOCYTES # BLD: 1.07 K/UL (ref 0–1)
MONOCYTES NFR BLD: 11.3 % (ref 5–13)
NEUTS SEG # BLD: 5.68 K/UL (ref 1.8–8)
NEUTS SEG NFR BLD: 59.8 % (ref 32–75)
NRBC # BLD: 0.05 K/UL (ref 0–0.01)
NRBC BLD-RTO: 0.5 PER 100 WBC
PLATELET # BLD AUTO: 395 K/UL (ref 150–400)
PMV BLD AUTO: 9.8 FL (ref 8.9–12.9)
POTASSIUM SERPL-SCNC: 3.8 MMOL/L (ref 3.5–5.1)
PROTHROMBIN TIME: 11.3 SEC (ref 9–11.1)
RBC # BLD AUTO: 2.9 M/UL (ref 3.8–5.2)
RBC MORPH BLD: ABNORMAL
RBC MORPH BLD: ABNORMAL
SERVICE CMNT-IMP: ABNORMAL
SODIUM SERPL-SCNC: 134 MMOL/L (ref 136–145)
THERAPEUTIC RANGE: ABNORMAL SECS (ref 58–77)
TSH SERPL DL<=0.05 MIU/L-ACNC: 0.52 UIU/ML (ref 0.36–3.74)
WBC # BLD AUTO: 9.5 K/UL (ref 3.6–11)

## 2025-01-11 PROCEDURE — 84443 ASSAY THYROID STIM HORMONE: CPT

## 2025-01-11 PROCEDURE — 6370000000 HC RX 637 (ALT 250 FOR IP)

## 2025-01-11 PROCEDURE — 85730 THROMBOPLASTIN TIME PARTIAL: CPT

## 2025-01-11 PROCEDURE — 6370000000 HC RX 637 (ALT 250 FOR IP): Performed by: INTERNAL MEDICINE

## 2025-01-11 PROCEDURE — 94761 N-INVAS EAR/PLS OXIMETRY MLT: CPT

## 2025-01-11 PROCEDURE — 85025 COMPLETE CBC W/AUTO DIFF WBC: CPT

## 2025-01-11 PROCEDURE — 2000000000 HC ICU R&B

## 2025-01-11 PROCEDURE — 36415 COLL VENOUS BLD VENIPUNCTURE: CPT

## 2025-01-11 PROCEDURE — 85610 PROTHROMBIN TIME: CPT

## 2025-01-11 PROCEDURE — 99233 SBSQ HOSP IP/OBS HIGH 50: CPT | Performed by: INTERNAL MEDICINE

## 2025-01-11 PROCEDURE — 6370000000 HC RX 637 (ALT 250 FOR IP): Performed by: HOSPITALIST

## 2025-01-11 PROCEDURE — 80048 BASIC METABOLIC PNL TOTAL CA: CPT

## 2025-01-11 PROCEDURE — 6360000002 HC RX W HCPCS: Performed by: STUDENT IN AN ORGANIZED HEALTH CARE EDUCATION/TRAINING PROGRAM

## 2025-01-11 PROCEDURE — 6370000000 HC RX 637 (ALT 250 FOR IP): Performed by: STUDENT IN AN ORGANIZED HEALTH CARE EDUCATION/TRAINING PROGRAM

## 2025-01-11 PROCEDURE — 2500000003 HC RX 250 WO HCPCS: Performed by: INTERNAL MEDICINE

## 2025-01-11 PROCEDURE — 2700000000 HC OXYGEN THERAPY PER DAY

## 2025-01-11 PROCEDURE — 1100000000 HC RM PRIVATE

## 2025-01-11 PROCEDURE — 82962 GLUCOSE BLOOD TEST: CPT

## 2025-01-11 RX ORDER — ENOXAPARIN SODIUM 100 MG/ML
1 INJECTION SUBCUTANEOUS EVERY 24 HOURS
Status: DISCONTINUED | OUTPATIENT
Start: 2025-01-11 | End: 2025-01-15

## 2025-01-11 RX ORDER — ENOXAPARIN SODIUM 100 MG/ML
1 INJECTION SUBCUTANEOUS 2 TIMES DAILY
Status: DISCONTINUED | OUTPATIENT
Start: 2025-01-11 | End: 2025-01-11

## 2025-01-11 RX ORDER — DILTIAZEM HYDROCHLORIDE 120 MG/1
240 CAPSULE, COATED, EXTENDED RELEASE ORAL DAILY
Status: DISCONTINUED | OUTPATIENT
Start: 2025-01-11 | End: 2025-01-15 | Stop reason: HOSPADM

## 2025-01-11 RX ORDER — ENOXAPARIN SODIUM 100 MG/ML
1 INJECTION SUBCUTANEOUS DAILY
Status: DISCONTINUED | OUTPATIENT
Start: 2025-01-11 | End: 2025-01-11

## 2025-01-11 RX ORDER — TRAZODONE HYDROCHLORIDE 50 MG/1
50 TABLET, FILM COATED ORAL NIGHTLY PRN
Status: DISCONTINUED | OUTPATIENT
Start: 2025-01-11 | End: 2025-01-15 | Stop reason: HOSPADM

## 2025-01-11 RX ORDER — WARFARIN SODIUM 5 MG/1
7.5 TABLET ORAL
Status: COMPLETED | OUTPATIENT
Start: 2025-01-11 | End: 2025-01-11

## 2025-01-11 RX ADMIN — DILTIAZEM HYDROCHLORIDE 60 MG: 30 TABLET ORAL at 05:24

## 2025-01-11 RX ADMIN — Medication 3 MG: at 02:00

## 2025-01-11 RX ADMIN — METOPROLOL TARTRATE 75 MG: 50 TABLET, FILM COATED ORAL at 07:54

## 2025-01-11 RX ADMIN — LEVOTHYROXINE SODIUM 100 MCG: 100 TABLET ORAL at 05:24

## 2025-01-11 RX ADMIN — WARFARIN SODIUM 7.5 MG: 5 TABLET ORAL at 17:58

## 2025-01-11 RX ADMIN — SODIUM CHLORIDE, PRESERVATIVE FREE 10 ML: 5 INJECTION INTRAVENOUS at 07:55

## 2025-01-11 RX ADMIN — INSULIN LISPRO 1 UNITS: 100 INJECTION, SOLUTION INTRAVENOUS; SUBCUTANEOUS at 20:22

## 2025-01-11 RX ADMIN — OXYCODONE HYDROCHLORIDE AND ACETAMINOPHEN 250 MG: 500 TABLET ORAL at 07:55

## 2025-01-11 RX ADMIN — Medication 3 MG: at 20:11

## 2025-01-11 RX ADMIN — INSULIN LISPRO 5 UNITS: 100 INJECTION, SOLUTION INTRAVENOUS; SUBCUTANEOUS at 17:45

## 2025-01-11 RX ADMIN — INSULIN LISPRO 1 UNITS: 100 INJECTION, SOLUTION INTRAVENOUS; SUBCUTANEOUS at 07:53

## 2025-01-11 RX ADMIN — METOPROLOL TARTRATE 75 MG: 50 TABLET, FILM COATED ORAL at 15:01

## 2025-01-11 RX ADMIN — INSULIN GLARGINE 14 UNITS: 100 INJECTION, SOLUTION SUBCUTANEOUS at 20:16

## 2025-01-11 RX ADMIN — DILTIAZEM HYDROCHLORIDE 240 MG: 120 CAPSULE, COATED, EXTENDED RELEASE ORAL at 11:55

## 2025-01-11 RX ADMIN — ENOXAPARIN SODIUM 90 MG: 100 INJECTION SUBCUTANEOUS at 15:03

## 2025-01-11 RX ADMIN — GABAPENTIN 200 MG: 100 CAPSULE ORAL at 07:54

## 2025-01-11 RX ADMIN — GABAPENTIN 200 MG: 100 CAPSULE ORAL at 20:11

## 2025-01-11 RX ADMIN — FERROUS SULFATE TAB 325 MG (65 MG ELEMENTAL FE) 325 MG: 325 (65 FE) TAB at 15:01

## 2025-01-11 RX ADMIN — ACETAMINOPHEN 650 MG: 325 TABLET ORAL at 17:58

## 2025-01-11 RX ADMIN — RANOLAZINE 500 MG: 500 TABLET, FILM COATED, EXTENDED RELEASE ORAL at 07:54

## 2025-01-11 RX ADMIN — INSULIN LISPRO 2 UNITS: 100 INJECTION, SOLUTION INTRAVENOUS; SUBCUTANEOUS at 17:45

## 2025-01-11 RX ADMIN — INSULIN LISPRO 5 UNITS: 100 INJECTION, SOLUTION INTRAVENOUS; SUBCUTANEOUS at 07:53

## 2025-01-11 RX ADMIN — INSULIN LISPRO 1 UNITS: 100 INJECTION, SOLUTION INTRAVENOUS; SUBCUTANEOUS at 11:54

## 2025-01-11 RX ADMIN — SODIUM CHLORIDE, PRESERVATIVE FREE 10 ML: 5 INJECTION INTRAVENOUS at 20:18

## 2025-01-11 RX ADMIN — INSULIN LISPRO 5 UNITS: 100 INJECTION, SOLUTION INTRAVENOUS; SUBCUTANEOUS at 11:54

## 2025-01-11 RX ADMIN — ATORVASTATIN CALCIUM 20 MG: 20 TABLET, FILM COATED ORAL at 07:55

## 2025-01-11 RX ADMIN — METOPROLOL TARTRATE 75 MG: 50 TABLET, FILM COATED ORAL at 20:11

## 2025-01-11 RX ADMIN — ACETAMINOPHEN 650 MG: 325 TABLET ORAL at 02:00

## 2025-01-11 ASSESSMENT — PAIN SCALES - GENERAL
PAINLEVEL_OUTOF10: 0
PAINLEVEL_OUTOF10: 7
PAINLEVEL_OUTOF10: 6
PAINLEVEL_OUTOF10: 0

## 2025-01-11 ASSESSMENT — PAIN DESCRIPTION - LOCATION
LOCATION: HEAD
LOCATION: LEG

## 2025-01-11 ASSESSMENT — PAIN DESCRIPTION - DESCRIPTORS: DESCRIPTORS: DULL

## 2025-01-12 PROBLEM — I51.3 THROMBUS OF LEFT ATRIAL APPENDAGE: Status: ACTIVE | Noted: 2025-01-12

## 2025-01-12 LAB
ANION GAP SERPL CALC-SCNC: 6 MMOL/L (ref 2–12)
BASOPHILS # BLD: 0.06 K/UL (ref 0–0.1)
BASOPHILS NFR BLD: 0.6 % (ref 0–1)
BUN SERPL-MCNC: 78 MG/DL (ref 6–20)
BUN/CREAT SERPL: 28 (ref 12–20)
CALCIUM SERPL-MCNC: 9.2 MG/DL (ref 8.5–10.1)
CHLORIDE SERPL-SCNC: 99 MMOL/L (ref 97–108)
CO2 SERPL-SCNC: 28 MMOL/L (ref 21–32)
CREAT SERPL-MCNC: 2.74 MG/DL (ref 0.55–1.02)
DIFFERENTIAL METHOD BLD: ABNORMAL
EOSINOPHIL # BLD: 0.62 K/UL (ref 0–0.4)
EOSINOPHIL NFR BLD: 6.5 % (ref 0–7)
ERYTHROCYTE [DISTWIDTH] IN BLOOD BY AUTOMATED COUNT: 20.4 % (ref 11.5–14.5)
GLUCOSE BLD STRIP.AUTO-MCNC: 133 MG/DL (ref 65–117)
GLUCOSE BLD STRIP.AUTO-MCNC: 145 MG/DL (ref 65–117)
GLUCOSE BLD STRIP.AUTO-MCNC: 246 MG/DL (ref 65–117)
GLUCOSE BLD STRIP.AUTO-MCNC: 258 MG/DL (ref 65–117)
GLUCOSE SERPL-MCNC: 152 MG/DL (ref 65–100)
HCT VFR BLD AUTO: 25.1 % (ref 35–47)
HGB BLD-MCNC: 8.2 G/DL (ref 11.5–16)
IMM GRANULOCYTES # BLD AUTO: 0.12 K/UL (ref 0–0.04)
IMM GRANULOCYTES NFR BLD AUTO: 1.3 % (ref 0–0.5)
INR PPP: 1.2 (ref 0.9–1.1)
LYMPHOCYTES # BLD: 1.76 K/UL (ref 0.8–3.5)
LYMPHOCYTES NFR BLD: 18.5 % (ref 12–49)
MCH RBC QN AUTO: 29.5 PG (ref 26–34)
MCHC RBC AUTO-ENTMCNC: 32.7 G/DL (ref 30–36.5)
MCV RBC AUTO: 90.3 FL (ref 80–99)
MONOCYTES # BLD: 1.09 K/UL (ref 0–1)
MONOCYTES NFR BLD: 11.5 % (ref 5–13)
NEUTS SEG # BLD: 5.85 K/UL (ref 1.8–8)
NEUTS SEG NFR BLD: 61.6 % (ref 32–75)
NRBC # BLD: 0.04 K/UL (ref 0–0.01)
NRBC BLD-RTO: 0.4 PER 100 WBC
PLATELET # BLD AUTO: 379 K/UL (ref 150–400)
PMV BLD AUTO: 9.6 FL (ref 8.9–12.9)
POTASSIUM SERPL-SCNC: 4 MMOL/L (ref 3.5–5.1)
PROTHROMBIN TIME: 12.7 SEC (ref 9–11.1)
RBC # BLD AUTO: 2.78 M/UL (ref 3.8–5.2)
RBC MORPH BLD: ABNORMAL
RBC MORPH BLD: ABNORMAL
SERVICE CMNT-IMP: ABNORMAL
SODIUM SERPL-SCNC: 133 MMOL/L (ref 136–145)
WBC # BLD AUTO: 9.5 K/UL (ref 3.6–11)

## 2025-01-12 PROCEDURE — 85610 PROTHROMBIN TIME: CPT

## 2025-01-12 PROCEDURE — 6370000000 HC RX 637 (ALT 250 FOR IP): Performed by: HOSPITALIST

## 2025-01-12 PROCEDURE — 1100000000 HC RM PRIVATE

## 2025-01-12 PROCEDURE — APPSS30 APP SPLIT SHARED TIME 16-30 MINUTES: Performed by: NURSE PRACTITIONER

## 2025-01-12 PROCEDURE — 36415 COLL VENOUS BLD VENIPUNCTURE: CPT

## 2025-01-12 PROCEDURE — 6370000000 HC RX 637 (ALT 250 FOR IP): Performed by: STUDENT IN AN ORGANIZED HEALTH CARE EDUCATION/TRAINING PROGRAM

## 2025-01-12 PROCEDURE — 85025 COMPLETE CBC W/AUTO DIFF WBC: CPT

## 2025-01-12 PROCEDURE — 6370000000 HC RX 637 (ALT 250 FOR IP)

## 2025-01-12 PROCEDURE — 6370000000 HC RX 637 (ALT 250 FOR IP): Performed by: INTERNAL MEDICINE

## 2025-01-12 PROCEDURE — 6360000002 HC RX W HCPCS: Performed by: STUDENT IN AN ORGANIZED HEALTH CARE EDUCATION/TRAINING PROGRAM

## 2025-01-12 PROCEDURE — 2500000003 HC RX 250 WO HCPCS: Performed by: INTERNAL MEDICINE

## 2025-01-12 PROCEDURE — 94761 N-INVAS EAR/PLS OXIMETRY MLT: CPT

## 2025-01-12 PROCEDURE — 80048 BASIC METABOLIC PNL TOTAL CA: CPT

## 2025-01-12 PROCEDURE — 82962 GLUCOSE BLOOD TEST: CPT

## 2025-01-12 RX ORDER — POLYETHYLENE GLYCOL 3350 17 G/17G
17 POWDER, FOR SOLUTION ORAL 2 TIMES DAILY
Status: DISCONTINUED | OUTPATIENT
Start: 2025-01-12 | End: 2025-01-15 | Stop reason: HOSPADM

## 2025-01-12 RX ORDER — POLYETHYLENE GLYCOL 3350 17 G/17G
17 POWDER, FOR SOLUTION ORAL DAILY
Status: DISCONTINUED | OUTPATIENT
Start: 2025-01-12 | End: 2025-01-12

## 2025-01-12 RX ORDER — SENNA AND DOCUSATE SODIUM 50; 8.6 MG/1; MG/1
2 TABLET, FILM COATED ORAL DAILY
Status: DISCONTINUED | OUTPATIENT
Start: 2025-01-12 | End: 2025-01-15 | Stop reason: HOSPADM

## 2025-01-12 RX ORDER — WARFARIN SODIUM 2.5 MG/1
7.5 TABLET ORAL
Status: COMPLETED | OUTPATIENT
Start: 2025-01-12 | End: 2025-01-12

## 2025-01-12 RX ADMIN — SODIUM CHLORIDE, PRESERVATIVE FREE 10 ML: 5 INJECTION INTRAVENOUS at 08:51

## 2025-01-12 RX ADMIN — ENOXAPARIN SODIUM 90 MG: 100 INJECTION SUBCUTANEOUS at 15:50

## 2025-01-12 RX ADMIN — INSULIN LISPRO 2 UNITS: 100 INJECTION, SOLUTION INTRAVENOUS; SUBCUTANEOUS at 22:12

## 2025-01-12 RX ADMIN — INSULIN LISPRO 5 UNITS: 100 INJECTION, SOLUTION INTRAVENOUS; SUBCUTANEOUS at 11:40

## 2025-01-12 RX ADMIN — METOPROLOL TARTRATE 75 MG: 50 TABLET, FILM COATED ORAL at 15:50

## 2025-01-12 RX ADMIN — SENNOSIDES AND DOCUSATE SODIUM 2 TABLET: 50; 8.6 TABLET ORAL at 11:46

## 2025-01-12 RX ADMIN — LEVOTHYROXINE SODIUM 100 MCG: 100 TABLET ORAL at 08:49

## 2025-01-12 RX ADMIN — OXYCODONE HYDROCHLORIDE AND ACETAMINOPHEN 250 MG: 500 TABLET ORAL at 08:50

## 2025-01-12 RX ADMIN — INSULIN LISPRO 5 UNITS: 100 INJECTION, SOLUTION INTRAVENOUS; SUBCUTANEOUS at 08:52

## 2025-01-12 RX ADMIN — INSULIN LISPRO 3 UNITS: 100 INJECTION, SOLUTION INTRAVENOUS; SUBCUTANEOUS at 17:50

## 2025-01-12 RX ADMIN — METOPROLOL TARTRATE 75 MG: 50 TABLET, FILM COATED ORAL at 08:49

## 2025-01-12 RX ADMIN — RANOLAZINE 500 MG: 500 TABLET, FILM COATED, EXTENDED RELEASE ORAL at 08:49

## 2025-01-12 RX ADMIN — SODIUM CHLORIDE, PRESERVATIVE FREE 10 ML: 5 INJECTION INTRAVENOUS at 22:12

## 2025-01-12 RX ADMIN — GABAPENTIN 200 MG: 100 CAPSULE ORAL at 22:09

## 2025-01-12 RX ADMIN — ACETAMINOPHEN 650 MG: 325 TABLET ORAL at 10:54

## 2025-01-12 RX ADMIN — GABAPENTIN 200 MG: 100 CAPSULE ORAL at 08:49

## 2025-01-12 RX ADMIN — INSULIN GLARGINE 14 UNITS: 100 INJECTION, SOLUTION SUBCUTANEOUS at 22:12

## 2025-01-12 RX ADMIN — ACETAMINOPHEN 650 MG: 325 TABLET ORAL at 04:20

## 2025-01-12 RX ADMIN — POLYETHYLENE GLYCOL 3350 17 G: 17 POWDER, FOR SOLUTION ORAL at 11:46

## 2025-01-12 RX ADMIN — ATORVASTATIN CALCIUM 20 MG: 20 TABLET, FILM COATED ORAL at 08:49

## 2025-01-12 RX ADMIN — ACETAMINOPHEN 650 MG: 325 TABLET ORAL at 17:49

## 2025-01-12 RX ADMIN — DILTIAZEM HYDROCHLORIDE 240 MG: 120 CAPSULE, COATED, EXTENDED RELEASE ORAL at 08:50

## 2025-01-12 RX ADMIN — FERROUS SULFATE TAB 325 MG (65 MG ELEMENTAL FE) 325 MG: 325 (65 FE) TAB at 15:50

## 2025-01-12 RX ADMIN — POLYETHYLENE GLYCOL 3350 17 G: 17 POWDER, FOR SOLUTION ORAL at 22:12

## 2025-01-12 RX ADMIN — METOPROLOL TARTRATE 75 MG: 50 TABLET, FILM COATED ORAL at 22:11

## 2025-01-12 RX ADMIN — Medication 3 MG: at 22:11

## 2025-01-12 RX ADMIN — INSULIN LISPRO 5 UNITS: 100 INJECTION, SOLUTION INTRAVENOUS; SUBCUTANEOUS at 17:50

## 2025-01-12 RX ADMIN — WARFARIN SODIUM 7.5 MG: 2.5 TABLET ORAL at 17:49

## 2025-01-12 ASSESSMENT — PAIN DESCRIPTION - LOCATION
LOCATION: FACE;EYE
LOCATION: HEAD
LOCATION: HEAD

## 2025-01-12 ASSESSMENT — PAIN DESCRIPTION - PAIN TYPE: TYPE: ACUTE PAIN

## 2025-01-12 ASSESSMENT — PAIN - FUNCTIONAL ASSESSMENT: PAIN_FUNCTIONAL_ASSESSMENT: ACTIVITIES ARE NOT PREVENTED

## 2025-01-12 ASSESSMENT — PAIN DESCRIPTION - ONSET: ONSET: ON-GOING

## 2025-01-12 ASSESSMENT — PAIN SCALES - GENERAL
PAINLEVEL_OUTOF10: 7
PAINLEVEL_OUTOF10: 7
PAINLEVEL_OUTOF10: 6
PAINLEVEL_OUTOF10: 7
PAINLEVEL_OUTOF10: 7

## 2025-01-12 ASSESSMENT — PAIN DESCRIPTION - ORIENTATION: ORIENTATION: LEFT;ANTERIOR

## 2025-01-12 ASSESSMENT — PAIN DESCRIPTION - FREQUENCY: FREQUENCY: CONTINUOUS

## 2025-01-12 ASSESSMENT — PAIN DESCRIPTION - DESCRIPTORS
DESCRIPTORS: DULL
DESCRIPTORS: ACHING

## 2025-01-13 LAB
ALBUMIN SERPL-MCNC: 2.5 G/DL (ref 3.5–5)
ALBUMIN/GLOB SERPL: 0.6 (ref 1.1–2.2)
ALP SERPL-CCNC: 82 U/L (ref 45–117)
ALT SERPL-CCNC: 16 U/L (ref 12–78)
ANION GAP SERPL CALC-SCNC: 5 MMOL/L (ref 2–12)
ANION GAP SERPL CALC-SCNC: 6 MMOL/L (ref 2–12)
AST SERPL-CCNC: 17 U/L (ref 15–37)
BASOPHILS # BLD: 0.04 K/UL (ref 0–0.1)
BASOPHILS # BLD: 0.04 K/UL (ref 0–0.1)
BASOPHILS NFR BLD: 0.5 % (ref 0–1)
BASOPHILS NFR BLD: 0.5 % (ref 0–1)
BILIRUB SERPL-MCNC: 0.3 MG/DL (ref 0.2–1)
BUN SERPL-MCNC: 77 MG/DL (ref 6–20)
BUN SERPL-MCNC: 78 MG/DL (ref 6–20)
BUN/CREAT SERPL: 30 (ref 12–20)
BUN/CREAT SERPL: 30 (ref 12–20)
CALCIUM SERPL-MCNC: 9.5 MG/DL (ref 8.5–10.1)
CALCIUM SERPL-MCNC: 9.5 MG/DL (ref 8.5–10.1)
CHLORIDE SERPL-SCNC: 102 MMOL/L (ref 97–108)
CHLORIDE SERPL-SCNC: 102 MMOL/L (ref 97–108)
CO2 SERPL-SCNC: 28 MMOL/L (ref 21–32)
CO2 SERPL-SCNC: 30 MMOL/L (ref 21–32)
CREAT SERPL-MCNC: 2.6 MG/DL (ref 0.55–1.02)
CREAT SERPL-MCNC: 2.6 MG/DL (ref 0.55–1.02)
DIFFERENTIAL METHOD BLD: ABNORMAL
DIFFERENTIAL METHOD BLD: ABNORMAL
EOSINOPHIL # BLD: 0.45 K/UL (ref 0–0.4)
EOSINOPHIL # BLD: 0.45 K/UL (ref 0–0.4)
EOSINOPHIL NFR BLD: 5.7 % (ref 0–7)
EOSINOPHIL NFR BLD: 6 % (ref 0–7)
ERYTHROCYTE [DISTWIDTH] IN BLOOD BY AUTOMATED COUNT: 20.7 % (ref 11.5–14.5)
ERYTHROCYTE [DISTWIDTH] IN BLOOD BY AUTOMATED COUNT: 21.3 % (ref 11.5–14.5)
GLOBULIN SER CALC-MCNC: 4.2 G/DL (ref 2–4)
GLUCOSE BLD STRIP.AUTO-MCNC: 126 MG/DL (ref 65–117)
GLUCOSE BLD STRIP.AUTO-MCNC: 167 MG/DL (ref 65–117)
GLUCOSE BLD STRIP.AUTO-MCNC: 181 MG/DL (ref 65–117)
GLUCOSE BLD STRIP.AUTO-MCNC: 190 MG/DL (ref 65–117)
GLUCOSE BLD STRIP.AUTO-MCNC: 209 MG/DL (ref 65–117)
GLUCOSE SERPL-MCNC: 133 MG/DL (ref 65–100)
GLUCOSE SERPL-MCNC: 96 MG/DL (ref 65–100)
HCT VFR BLD AUTO: 26.2 % (ref 35–47)
HCT VFR BLD AUTO: 26.7 % (ref 35–47)
HGB BLD-MCNC: 8.5 G/DL (ref 11.5–16)
HGB BLD-MCNC: 8.7 G/DL (ref 11.5–16)
IMM GRANULOCYTES # BLD AUTO: 0.09 K/UL (ref 0–0.04)
IMM GRANULOCYTES # BLD AUTO: 0.12 K/UL (ref 0–0.04)
IMM GRANULOCYTES NFR BLD AUTO: 1.2 % (ref 0–0.5)
IMM GRANULOCYTES NFR BLD AUTO: 1.5 % (ref 0–0.5)
INR PPP: 1.5 (ref 0.9–1.1)
LYMPHOCYTES # BLD: 1.19 K/UL (ref 0.8–3.5)
LYMPHOCYTES # BLD: 1.28 K/UL (ref 0.8–3.5)
LYMPHOCYTES NFR BLD: 15.8 % (ref 12–49)
LYMPHOCYTES NFR BLD: 16.2 % (ref 12–49)
MCH RBC QN AUTO: 29.4 PG (ref 26–34)
MCH RBC QN AUTO: 29.6 PG (ref 26–34)
MCHC RBC AUTO-ENTMCNC: 32.4 G/DL (ref 30–36.5)
MCHC RBC AUTO-ENTMCNC: 32.6 G/DL (ref 30–36.5)
MCV RBC AUTO: 90.7 FL (ref 80–99)
MCV RBC AUTO: 90.8 FL (ref 80–99)
MONOCYTES # BLD: 0.83 K/UL (ref 0–1)
MONOCYTES # BLD: 0.89 K/UL (ref 0–1)
MONOCYTES NFR BLD: 11.1 % (ref 5–13)
MONOCYTES NFR BLD: 11.3 % (ref 5–13)
NEUTS SEG # BLD: 4.9 K/UL (ref 1.8–8)
NEUTS SEG # BLD: 5.12 K/UL (ref 1.8–8)
NEUTS SEG NFR BLD: 64.8 % (ref 32–75)
NEUTS SEG NFR BLD: 65.4 % (ref 32–75)
NRBC # BLD: 0.05 K/UL (ref 0–0.01)
NRBC # BLD: 0.06 K/UL (ref 0–0.01)
NRBC BLD-RTO: 0.6 PER 100 WBC
NRBC BLD-RTO: 0.8 PER 100 WBC
PLATELET # BLD AUTO: 411 K/UL (ref 150–400)
PLATELET # BLD AUTO: 434 K/UL (ref 150–400)
PMV BLD AUTO: 10.4 FL (ref 8.9–12.9)
PMV BLD AUTO: 9.9 FL (ref 8.9–12.9)
POTASSIUM SERPL-SCNC: 4 MMOL/L (ref 3.5–5.1)
POTASSIUM SERPL-SCNC: 4.2 MMOL/L (ref 3.5–5.1)
PROT SERPL-MCNC: 6.7 G/DL (ref 6.4–8.2)
PROTHROMBIN TIME: 15.5 SEC (ref 9–11.1)
RBC # BLD AUTO: 2.89 M/UL (ref 3.8–5.2)
RBC # BLD AUTO: 2.94 M/UL (ref 3.8–5.2)
RBC MORPH BLD: ABNORMAL
SERVICE CMNT-IMP: ABNORMAL
SODIUM SERPL-SCNC: 136 MMOL/L (ref 136–145)
SODIUM SERPL-SCNC: 137 MMOL/L (ref 136–145)
WBC # BLD AUTO: 7.5 K/UL (ref 3.6–11)
WBC # BLD AUTO: 7.9 K/UL (ref 3.6–11)

## 2025-01-13 PROCEDURE — 6370000000 HC RX 637 (ALT 250 FOR IP): Performed by: INTERNAL MEDICINE

## 2025-01-13 PROCEDURE — 6370000000 HC RX 637 (ALT 250 FOR IP): Performed by: HOSPITALIST

## 2025-01-13 PROCEDURE — 36415 COLL VENOUS BLD VENIPUNCTURE: CPT

## 2025-01-13 PROCEDURE — 1100000000 HC RM PRIVATE

## 2025-01-13 PROCEDURE — 2500000003 HC RX 250 WO HCPCS: Performed by: INTERNAL MEDICINE

## 2025-01-13 PROCEDURE — 6370000000 HC RX 637 (ALT 250 FOR IP)

## 2025-01-13 PROCEDURE — 6370000000 HC RX 637 (ALT 250 FOR IP): Performed by: STUDENT IN AN ORGANIZED HEALTH CARE EDUCATION/TRAINING PROGRAM

## 2025-01-13 PROCEDURE — 6370000000 HC RX 637 (ALT 250 FOR IP): Performed by: NURSE PRACTITIONER

## 2025-01-13 PROCEDURE — 85025 COMPLETE CBC W/AUTO DIFF WBC: CPT

## 2025-01-13 PROCEDURE — 85610 PROTHROMBIN TIME: CPT

## 2025-01-13 PROCEDURE — 80053 COMPREHEN METABOLIC PANEL: CPT

## 2025-01-13 PROCEDURE — 6360000002 HC RX W HCPCS: Performed by: STUDENT IN AN ORGANIZED HEALTH CARE EDUCATION/TRAINING PROGRAM

## 2025-01-13 PROCEDURE — 82962 GLUCOSE BLOOD TEST: CPT

## 2025-01-13 PROCEDURE — 94761 N-INVAS EAR/PLS OXIMETRY MLT: CPT

## 2025-01-13 RX ORDER — TRAZODONE HYDROCHLORIDE 50 MG/1
50 TABLET, FILM COATED ORAL NIGHTLY PRN
Qty: 10 TABLET | Refills: 0 | Status: ON HOLD
Start: 2025-01-13 | End: 2025-01-23

## 2025-01-13 RX ORDER — INSULIN LISPRO 100 [IU]/ML
0-4 INJECTION, SOLUTION INTRAVENOUS; SUBCUTANEOUS
Qty: 5 ML | Refills: 0 | Status: ON HOLD
Start: 2025-01-13

## 2025-01-13 RX ORDER — SODIUM PHOSPHATE, DIBASIC AND SODIUM PHOSPHATE, MONOBASIC 7; 19 G/230ML; G/230ML
1 ENEMA RECTAL
Status: COMPLETED | OUTPATIENT
Start: 2025-01-13 | End: 2025-01-13

## 2025-01-13 RX ORDER — WARFARIN SODIUM 2.5 MG/1
7.5 TABLET ORAL
Status: DISCONTINUED | OUTPATIENT
Start: 2025-01-13 | End: 2025-01-13

## 2025-01-13 RX ORDER — BISACODYL 10 MG
10 SUPPOSITORY, RECTAL RECTAL DAILY PRN
Status: DISCONTINUED | OUTPATIENT
Start: 2025-01-13 | End: 2025-01-15 | Stop reason: HOSPADM

## 2025-01-13 RX ORDER — POLYETHYLENE GLYCOL 3350 17 G/17G
17 POWDER, FOR SOLUTION ORAL DAILY
Qty: 527 G | Refills: 0 | Status: ON HOLD
Start: 2025-01-13 | End: 2025-02-12

## 2025-01-13 RX ORDER — INSULIN GLARGINE 100 [IU]/ML
14 INJECTION, SOLUTION SUBCUTANEOUS NIGHTLY
Qty: 10 ML | Refills: 0 | Status: ON HOLD
Start: 2025-01-13 | End: 2025-02-12

## 2025-01-13 RX ORDER — GABAPENTIN 100 MG/1
200 CAPSULE ORAL 2 TIMES DAILY
Qty: 120 CAPSULE | Refills: 0 | Status: ON HOLD | OUTPATIENT
Start: 2025-01-13 | End: 2025-02-12

## 2025-01-13 RX ORDER — SENNA AND DOCUSATE SODIUM 50; 8.6 MG/1; MG/1
2 TABLET, FILM COATED ORAL DAILY
Qty: 60 TABLET | Refills: 0 | Status: ON HOLD
Start: 2025-01-14 | End: 2025-02-13

## 2025-01-13 RX ORDER — WARFARIN SODIUM 2.5 MG/1
7.5 TABLET ORAL
Status: COMPLETED | OUTPATIENT
Start: 2025-01-13 | End: 2025-01-13

## 2025-01-13 RX ORDER — INSULIN LISPRO 100 [IU]/ML
5 INJECTION, SOLUTION INTRAVENOUS; SUBCUTANEOUS
Qty: 10 ML | Refills: 0 | Status: ON HOLD
Start: 2025-01-13 | End: 2025-02-12

## 2025-01-13 RX ORDER — WARFARIN SODIUM 6 MG/1
6 TABLET ORAL DAILY
Qty: 30 TABLET | Refills: 3 | Status: SHIPPED
Start: 2025-01-14 | End: 2025-01-15 | Stop reason: HOSPADM

## 2025-01-13 RX ORDER — ENOXAPARIN SODIUM 100 MG/ML
1 INJECTION SUBCUTANEOUS EVERY 24 HOURS
Qty: 5 ML | Refills: 0 | Status: SHIPPED
Start: 2025-01-13 | End: 2025-01-15 | Stop reason: HOSPADM

## 2025-01-13 RX ORDER — DILTIAZEM HYDROCHLORIDE 240 MG/1
240 CAPSULE, COATED, EXTENDED RELEASE ORAL DAILY
Qty: 30 CAPSULE | Refills: 0 | Status: ON HOLD
Start: 2025-01-14 | End: 2025-02-13

## 2025-01-13 RX ORDER — METOPROLOL TARTRATE 75 MG/1
75 TABLET ORAL 3 TIMES DAILY
Qty: 60 TABLET | Refills: 0 | Status: SHIPPED
Start: 2025-01-13 | End: 2025-01-15 | Stop reason: HOSPADM

## 2025-01-13 RX ADMIN — INSULIN LISPRO 1 UNITS: 100 INJECTION, SOLUTION INTRAVENOUS; SUBCUTANEOUS at 08:39

## 2025-01-13 RX ADMIN — BISACODYL 10 MG: 10 SUPPOSITORY RECTAL at 18:41

## 2025-01-13 RX ADMIN — POLYETHYLENE GLYCOL 3350 17 G: 17 POWDER, FOR SOLUTION ORAL at 21:23

## 2025-01-13 RX ADMIN — INSULIN LISPRO 5 UNITS: 100 INJECTION, SOLUTION INTRAVENOUS; SUBCUTANEOUS at 17:16

## 2025-01-13 RX ADMIN — DILTIAZEM HYDROCHLORIDE 240 MG: 120 CAPSULE, COATED, EXTENDED RELEASE ORAL at 08:37

## 2025-01-13 RX ADMIN — FERROUS SULFATE TAB 325 MG (65 MG ELEMENTAL FE) 325 MG: 325 (65 FE) TAB at 15:44

## 2025-01-13 RX ADMIN — INSULIN LISPRO 5 UNITS: 100 INJECTION, SOLUTION INTRAVENOUS; SUBCUTANEOUS at 11:25

## 2025-01-13 RX ADMIN — GABAPENTIN 200 MG: 100 CAPSULE ORAL at 08:38

## 2025-01-13 RX ADMIN — POLYETHYLENE GLYCOL 3350 17 G: 17 POWDER, FOR SOLUTION ORAL at 08:38

## 2025-01-13 RX ADMIN — INSULIN LISPRO 5 UNITS: 100 INJECTION, SOLUTION INTRAVENOUS; SUBCUTANEOUS at 08:38

## 2025-01-13 RX ADMIN — METOPROLOL TARTRATE 75 MG: 50 TABLET, FILM COATED ORAL at 15:44

## 2025-01-13 RX ADMIN — GABAPENTIN 200 MG: 100 CAPSULE ORAL at 21:23

## 2025-01-13 RX ADMIN — RANOLAZINE 500 MG: 500 TABLET, FILM COATED, EXTENDED RELEASE ORAL at 08:37

## 2025-01-13 RX ADMIN — INSULIN LISPRO 1 UNITS: 100 INJECTION, SOLUTION INTRAVENOUS; SUBCUTANEOUS at 11:25

## 2025-01-13 RX ADMIN — ATORVASTATIN CALCIUM 20 MG: 20 TABLET, FILM COATED ORAL at 08:38

## 2025-01-13 RX ADMIN — ENOXAPARIN SODIUM 90 MG: 100 INJECTION SUBCUTANEOUS at 15:45

## 2025-01-13 RX ADMIN — SODIUM CHLORIDE, PRESERVATIVE FREE 10 ML: 5 INJECTION INTRAVENOUS at 21:23

## 2025-01-13 RX ADMIN — INSULIN GLARGINE 14 UNITS: 100 INJECTION, SOLUTION SUBCUTANEOUS at 21:22

## 2025-01-13 RX ADMIN — WARFARIN SODIUM 7.5 MG: 2.5 TABLET ORAL at 15:44

## 2025-01-13 RX ADMIN — OXYCODONE HYDROCHLORIDE AND ACETAMINOPHEN 250 MG: 500 TABLET ORAL at 08:38

## 2025-01-13 RX ADMIN — INSULIN LISPRO 1 UNITS: 100 INJECTION, SOLUTION INTRAVENOUS; SUBCUTANEOUS at 21:22

## 2025-01-13 RX ADMIN — LEVOTHYROXINE SODIUM 100 MCG: 100 TABLET ORAL at 07:04

## 2025-01-13 RX ADMIN — METOPROLOL TARTRATE 75 MG: 50 TABLET, FILM COATED ORAL at 21:23

## 2025-01-13 RX ADMIN — SENNOSIDES AND DOCUSATE SODIUM 2 TABLET: 50; 8.6 TABLET ORAL at 08:37

## 2025-01-13 RX ADMIN — SODIUM PHOSPHATE, DIBASIC AND SODIUM PHOSPHATE, MONOBASIC 1 ENEMA: 7; 19 ENEMA RECTAL at 23:21

## 2025-01-13 RX ADMIN — Medication 3 MG: at 21:39

## 2025-01-13 RX ADMIN — METOPROLOL TARTRATE 75 MG: 50 TABLET, FILM COATED ORAL at 08:37

## 2025-01-13 NOTE — DISCHARGE INSTRUCTIONS
TRANSFER  INSTRUCTIONS    NAME: Cuca Padron   :  1945   MRN:  [id number]     Date:    1/15/2025     ADMIT DATE: 2025     TRANSFER DATE: 1/15/2025     DISPOSITION: Rehab    DISCHARGE DIAGNOSIS:    Atrial flutter with rapid ventricular response (HCC)    Hypothyroidism due to acquired atrophy of thyroid    Type 2 diabetes mellitus with nephropathy (HCC)    Recurrent depression (HCC)    Hypercholesterolemia    Coronary artery disease involving native coronary artery without angina pectoris    Type 2 diabetes mellitus with diabetic polyneuropathy, with long-term current use of insulin (HCC)    Idiopathic peripheral neuropathy    CKD (chronic kidney disease) stage 4, GFR 15-29 ml/min (HCC)    Anemia in chronic kidney disease (CODE)    Ophthalmic herpes zoster    Severe mitral valve stenosis    Atypical atrial flutter (HCC)    Thrombus of left atrial appendage    MEDICATIONS: See medication list on the AVS. Monitor INR daily.     Recommended diet:  diabetic diet and renal diet    Recommended activity: Activity as tolerated    Follow Up:    JOANNA Gil MD  [address] [facility] [address] [phone]    Schedule an appointment as soon as possible for a visit  To schedule follow up after rehab    Jatinder Santana MD  [address] [facility]    [address]  [facility] [address] [phone]    Call  to schedule follow up with your cardiologist after rehab      Information obtained by :  I understand that if any problems occur once I am at home I am to contact my physician.    I understand and acknowledge receipt of the instructions indicated above.                                                                                                                                           Physician's or R.N.'s Signature                                                                  Date/Time

## 2025-01-14 LAB
GLUCOSE BLD STRIP.AUTO-MCNC: 191 MG/DL (ref 65–117)
GLUCOSE BLD STRIP.AUTO-MCNC: 198 MG/DL (ref 65–117)
GLUCOSE BLD STRIP.AUTO-MCNC: 198 MG/DL (ref 65–117)
GLUCOSE BLD STRIP.AUTO-MCNC: 297 MG/DL (ref 65–117)
INR PPP: 2.5 (ref 0.9–1.1)
PROTHROMBIN TIME: 24.3 SEC (ref 9–11.1)
SERVICE CMNT-IMP: ABNORMAL

## 2025-01-14 PROCEDURE — 85610 PROTHROMBIN TIME: CPT

## 2025-01-14 PROCEDURE — 1100000000 HC RM PRIVATE

## 2025-01-14 PROCEDURE — 94761 N-INVAS EAR/PLS OXIMETRY MLT: CPT

## 2025-01-14 PROCEDURE — 6370000000 HC RX 637 (ALT 250 FOR IP): Performed by: INTERNAL MEDICINE

## 2025-01-14 PROCEDURE — 97535 SELF CARE MNGMENT TRAINING: CPT

## 2025-01-14 PROCEDURE — 2500000003 HC RX 250 WO HCPCS: Performed by: INTERNAL MEDICINE

## 2025-01-14 PROCEDURE — 82962 GLUCOSE BLOOD TEST: CPT

## 2025-01-14 PROCEDURE — 99233 SBSQ HOSP IP/OBS HIGH 50: CPT | Performed by: HOSPITALIST

## 2025-01-14 PROCEDURE — 97110 THERAPEUTIC EXERCISES: CPT

## 2025-01-14 PROCEDURE — 6370000000 HC RX 637 (ALT 250 FOR IP)

## 2025-01-14 PROCEDURE — 6360000002 HC RX W HCPCS: Performed by: STUDENT IN AN ORGANIZED HEALTH CARE EDUCATION/TRAINING PROGRAM

## 2025-01-14 PROCEDURE — 6370000000 HC RX 637 (ALT 250 FOR IP): Performed by: HOSPITALIST

## 2025-01-14 PROCEDURE — 6370000000 HC RX 637 (ALT 250 FOR IP): Performed by: STUDENT IN AN ORGANIZED HEALTH CARE EDUCATION/TRAINING PROGRAM

## 2025-01-14 PROCEDURE — 97530 THERAPEUTIC ACTIVITIES: CPT

## 2025-01-14 RX ORDER — METOPROLOL SUCCINATE 50 MG/1
100 TABLET, EXTENDED RELEASE ORAL 2 TIMES DAILY
Status: DISCONTINUED | OUTPATIENT
Start: 2025-01-14 | End: 2025-01-15 | Stop reason: HOSPADM

## 2025-01-14 RX ADMIN — ACETAMINOPHEN 650 MG: 325 TABLET ORAL at 04:47

## 2025-01-14 RX ADMIN — FERROUS SULFATE TAB 325 MG (65 MG ELEMENTAL FE) 325 MG: 325 (65 FE) TAB at 16:15

## 2025-01-14 RX ADMIN — DILTIAZEM HYDROCHLORIDE 240 MG: 120 CAPSULE, COATED, EXTENDED RELEASE ORAL at 08:48

## 2025-01-14 RX ADMIN — INSULIN LISPRO 2 UNITS: 100 INJECTION, SOLUTION INTRAVENOUS; SUBCUTANEOUS at 21:59

## 2025-01-14 RX ADMIN — GABAPENTIN 200 MG: 100 CAPSULE ORAL at 08:49

## 2025-01-14 RX ADMIN — TRAZODONE HYDROCHLORIDE 50 MG: 50 TABLET ORAL at 22:05

## 2025-01-14 RX ADMIN — METOPROLOL SUCCINATE 100 MG: 50 TABLET, EXTENDED RELEASE ORAL at 21:58

## 2025-01-14 RX ADMIN — INSULIN GLARGINE 14 UNITS: 100 INJECTION, SOLUTION SUBCUTANEOUS at 21:59

## 2025-01-14 RX ADMIN — INSULIN LISPRO 1 UNITS: 100 INJECTION, SOLUTION INTRAVENOUS; SUBCUTANEOUS at 17:54

## 2025-01-14 RX ADMIN — LEVOTHYROXINE SODIUM 100 MCG: 100 TABLET ORAL at 05:30

## 2025-01-14 RX ADMIN — GABAPENTIN 200 MG: 100 CAPSULE ORAL at 21:58

## 2025-01-14 RX ADMIN — INSULIN LISPRO 1 UNITS: 100 INJECTION, SOLUTION INTRAVENOUS; SUBCUTANEOUS at 12:50

## 2025-01-14 RX ADMIN — INSULIN LISPRO 5 UNITS: 100 INJECTION, SOLUTION INTRAVENOUS; SUBCUTANEOUS at 17:54

## 2025-01-14 RX ADMIN — ATORVASTATIN CALCIUM 20 MG: 20 TABLET, FILM COATED ORAL at 08:48

## 2025-01-14 RX ADMIN — INSULIN LISPRO 5 UNITS: 100 INJECTION, SOLUTION INTRAVENOUS; SUBCUTANEOUS at 12:50

## 2025-01-14 RX ADMIN — INSULIN LISPRO 1 UNITS: 100 INJECTION, SOLUTION INTRAVENOUS; SUBCUTANEOUS at 08:47

## 2025-01-14 RX ADMIN — POLYETHYLENE GLYCOL 3350 17 G: 17 POWDER, FOR SOLUTION ORAL at 08:49

## 2025-01-14 RX ADMIN — Medication 3 MG: at 21:59

## 2025-01-14 RX ADMIN — POLYETHYLENE GLYCOL 3350 17 G: 17 POWDER, FOR SOLUTION ORAL at 22:00

## 2025-01-14 RX ADMIN — INSULIN LISPRO 5 UNITS: 100 INJECTION, SOLUTION INTRAVENOUS; SUBCUTANEOUS at 08:48

## 2025-01-14 RX ADMIN — OXYCODONE HYDROCHLORIDE AND ACETAMINOPHEN 250 MG: 500 TABLET ORAL at 08:48

## 2025-01-14 RX ADMIN — SODIUM CHLORIDE, PRESERVATIVE FREE 10 ML: 5 INJECTION INTRAVENOUS at 21:59

## 2025-01-14 RX ADMIN — SODIUM CHLORIDE, PRESERVATIVE FREE 10 ML: 5 INJECTION INTRAVENOUS at 09:03

## 2025-01-14 RX ADMIN — METOPROLOL TARTRATE 75 MG: 50 TABLET, FILM COATED ORAL at 08:48

## 2025-01-14 RX ADMIN — RANOLAZINE 500 MG: 500 TABLET, FILM COATED, EXTENDED RELEASE ORAL at 08:49

## 2025-01-14 RX ADMIN — SENNOSIDES AND DOCUSATE SODIUM 2 TABLET: 50; 8.6 TABLET ORAL at 08:49

## 2025-01-14 RX ADMIN — METOPROLOL TARTRATE 75 MG: 50 TABLET, FILM COATED ORAL at 16:15

## 2025-01-14 RX ADMIN — ENOXAPARIN SODIUM 90 MG: 100 INJECTION SUBCUTANEOUS at 16:16

## 2025-01-14 ASSESSMENT — PAIN DESCRIPTION - FREQUENCY: FREQUENCY: CONTINUOUS

## 2025-01-14 ASSESSMENT — PAIN DESCRIPTION - PAIN TYPE: TYPE: ACUTE PAIN;SURGICAL PAIN

## 2025-01-14 ASSESSMENT — PAIN DESCRIPTION - DESCRIPTORS: DESCRIPTORS: ACHING;THROBBING

## 2025-01-14 ASSESSMENT — PAIN SCALES - GENERAL
PAINLEVEL_OUTOF10: 3
PAINLEVEL_OUTOF10: 0
PAINLEVEL_OUTOF10: 0

## 2025-01-14 ASSESSMENT — PAIN - FUNCTIONAL ASSESSMENT: PAIN_FUNCTIONAL_ASSESSMENT: PREVENTS OR INTERFERES SOME ACTIVE ACTIVITIES AND ADLS

## 2025-01-14 ASSESSMENT — PAIN DESCRIPTION - LOCATION: LOCATION: LEG

## 2025-01-14 ASSESSMENT — PAIN DESCRIPTION - ORIENTATION: ORIENTATION: RIGHT;LEFT

## 2025-01-14 ASSESSMENT — PAIN DESCRIPTION - DIRECTION: RADIATING_TOWARDS: NONRADIATING

## 2025-01-14 NOTE — PROGRESS NOTES
CRISTAL HARTMANN [facility]  [address] [place] [address]  [phone]      Hospitalist  Progress Note      NAME:       Cuca Padron   :        1945  MRM:        [id number]    Date of service: 2025      Subjective: Patient seen and examined by me. Patient admitted with atrial flutter with RVR. Symptoms have been variable and now with dyspnea with exertion and a sustained tachycardia. She denies any chest pain or cough. No fever or chills. No nausea or vomiting.      Objective:    Vital Signs:    BP (!) 132/45   Pulse (!) 132   Temp 98.4 °F (36.9 °C) (Oral)   Resp 17   Ht 1.676 m (5' 6\")   Wt 90.7 kg (200 lb)   SpO2 98%   BMI 32.28 kg/m²        Intake/Output Summary (Last 24 hours) at 2025 1447  Last data filed at 2025 2315  Gross per 24 hour   Intake 1560 ml   Output --   Net 1560 ml        Current inpatient medications reviewed:  Current Facility-Administered Medications   Medication Dose Route Frequency    Warfarin HOLD DOSE TONIGHT 2025   Oral RX Placeholder    bisacodyl (DULCOLAX) suppository 10 mg  10 mg Rectal Daily PRN    polyethylene glycol (GLYCOLAX) packet 17 g  17 g Oral BID    sennosides-docusate sodium (SENOKOT-S) 8.6-50 MG tablet 2 tablet  2 tablet Oral Daily    melatonin tablet 3 mg  3 mg Oral Nightly    traZODone (DESYREL) tablet 50 mg  50 mg Oral Nightly PRN    dilTIAZem (CARDIZEM CD) extended release capsule 240 mg  240 mg Oral Daily    enoxaparin (LOVENOX) injection 90 mg  1 mg/kg SubCUTAneous Q24H    insulin glargine (LANTUS) injection vial 14 Units  14 Units SubCUTAneous Nightly    gabapentin (NEURONTIN) capsule 200 mg  200 mg Oral BID    insulin lispro (HUMALOG,ADMELOG) injection vial 5 Units  5 Units SubCUTAneous TID WC    metoprolol tartrate (LOPRESSOR) tablet 75 mg  75 mg Oral TID    insulin lispro (HUMALOG,ADMELOG) injection vial 0-4 Units  0-4 Units 
                                                             CRISTAL HARTMANN [facility]  [address] [place], [place] [id number] [phone]      Hospitalist  Progress Note      NAME:       Cuca Padron   :        1945  MRM:        [id number]    Date of service: 2025      Subjective: Patient seen and examined by me. Patient admitted with atrial flutter with RVR. Symptoms better. Has post herpetic pain left face. No fever or chills. No nausea or vomiting.      Objective:    Vital Signs:    BP (!) 151/61   Pulse 89   Temp 98.6 °F (37 °C) (Oral)   Resp 15   Ht 1.676 m (5' 6\")   Wt 90.7 kg (200 lb)   SpO2 98%   BMI 32.28 kg/m²        Intake/Output Summary (Last 24 hours) at 2025 1213  Last data filed at 2025 1015  Gross per 24 hour   Intake --   Output 1450 ml   Net -1450 ml        Current inpatient medications reviewed:  Current Facility-Administered Medications   Medication Dose Route Frequency    warfarin (COUMADIN) tablet 7.5 mg  7.5 mg Oral Once    polyethylene glycol (GLYCOLAX) packet 17 g  17 g Oral BID    sennosides-docusate sodium (SENOKOT-S) 8.6-50 MG tablet 2 tablet  2 tablet Oral Daily    melatonin tablet 3 mg  3 mg Oral Nightly    traZODone (DESYREL) tablet 50 mg  50 mg Oral Nightly PRN    dilTIAZem (CARDIZEM CD) extended release capsule 240 mg  240 mg Oral Daily    enoxaparin (LOVENOX) injection 90 mg  1 mg/kg SubCUTAneous Q24H    insulin glargine (LANTUS) injection vial 14 Units  14 Units SubCUTAneous Nightly    gabapentin (NEURONTIN) capsule 200 mg  200 mg Oral BID    insulin lispro (HUMALOG,ADMELOG) injection vial 5 Units  5 Units SubCUTAneous TID WC    metoprolol tartrate (LOPRESSOR) tablet 75 mg  75 mg Oral TID    insulin lispro (HUMALOG,ADMELOG) injection vial 0-4 Units  0-4 Units SubCUTAneous 4x Daily AC & HS    sodium chloride flush 0.9 % injection 5-40 mL  5-40 mL IntraVENous 2 times per day    sodium chloride flush 0.9 % injection 5-40 mL 
                                                          [facility] CARDIOLOGY  Cardiology Followup Note    Patient Name: Cuca Padron - :1945 - MRN:[id number]  Primary Cardiologist: HARLEY Santana  Consulting Cardiologist: Nurys Monteiro MD       Reason for encounter:   Atrial flutter with RVR, severe mitral stenosis    HPI:  79 y.o. 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  Initially presented to ED  for evaluation following a fall in the bathroom. No reported cp, LOC.  Found to be in atrial fibrillation with rapid ventricular rate.  Initiated on diltiazem gtt and admitted for continued management.    SUBJECTIVE:  1/10: She denies any chest pain or shortness of breath.  She denies any palpitations.  She has not ambulated.  Pending to see physical therapy today.  Remains in atrial flutter. Ventricular rate has improved now as patient is now in 3:1 AV conduction.  Previously ventricular rate was higher during 2:1 AV conduction.  Discussed plan in detail with patient and daughter in room today.     Assessment and Plan     Principal Problem:    Atrial flutter with rapid ventricular response (HCC)  Active Problems:    Hypothyroidism due to acquired atrophy of thyroid    Type 2 diabetes mellitus with nephropathy (HCC)    Recurrent depression (HCC)    Hypercholesterolemia    Coronary artery disease involving native coronary artery without angina pectoris    Type 2 diabetes mellitus with diabetic polyneuropathy, with long-term current use of insulin (HCC)    Idiopathic peripheral neuropathy    CKD (chronic kidney disease) stage 4, GFR 15-29 ml/min (HCC)    Anemia in chronic kidney disease (CODE)    Ophthalmic herpes zoster    Severe mitral valve stenosis    Atypical atrial flutter (HCC)    Head injury  Resolved Problems:    * No resolved hospital problems. *      # Severe 
    Hospitalist Progress Note      NAME:  Cuca Padron   :  1945  MRM:  [id number]    Date/Time: 2025  7:48 AM           Assessment / Plan:     79 y.o. female who is presenting for a fall.     Atrial flutter with rapid ventricular response POA:   - Unclear chronicity   - On Dilt drip  - Metoprolol 50 mg TID added  - H/o falls. Not a candidate for watchman given mitral disease. Started on warfarin, INR goal 2-3. Heparin bridge. Pharmacy to dose   - Echo with normal EF, severe mitral stenosis   - Cardiology consulted, planning for CAMRON/cardioversion today 25.     Severe Mitral Stenosis   - Noted on Echo. Likely the etiology for fatigue and dyspnea over the last several months   - Cardiology following   - Will need to follow up with Dr. Jatinder Santana ([facility]) to discuss long-term management     Type 2 diabetes mellitus with nephropathy / Type 2 diabetes mellitus with diabetic polyneuropathy, with long-term current use of insulin POA:   - Recent A1c 6.4 in 2024.   - Monitor BG. DM diet.   - Complex home regimen with 5+ insulin injections per day. On lantus and humalog at this time. Dm following to assist with home regimen   - Continue sliding scale     Leukocytosis   - Potentially reactive due to above   - Resolved      Coronary artery disease involving native coronary artery without angina pectoris / Hypercholesterolemia / HTN POA:   - Troponin low.   - Continue Lipitor, Ranexa     Hypothyroidism due to acquired atrophy of thyroid POA:   - Continue Levothyroxine     CKD (chronic kidney disease) stage 4, GFR 15-29 ml/min POA:   - at baseline   - monitor renal function.      Anemia in chronic kidney disease POA:   - At baseline Iron panel normal.   - Continue PO iron      Idiopathic peripheral neuropathy POA:   - Resume Gabapentin     Ophthalmic herpes zoster POA:   - Completed valtrex        I have personally reviewed the radiographs, laboratory data in Epic and decisions and statements above are 
    Hospitalist Progress Note      NAME:  Cuca Padron   :  1945  MRM:  [id number]    Date/Time: 2025  7:48 AM           Assessment / Plan:     79 y.o. female who is presenting for a fall.     Atrial flutter with rapid ventricular response POA:   - Unclear chronicity. Echo with normal EF, severe mitral stenosis   - S/p Dilt drip. Continue Metoprolol 75 mg TID. Switch to long acting Cardizem 240 mg.   - H/o falls. Not a candidate for watchman given mitral disease. Started on warfarin, INR goal 2-3. Stop heparin drip. Switch to Lovenox bridge   - Cardiology consulted, CAMRON 25 with LV thrombus and cardioversion not perfromed   - Will need Op cardiology follow up   - PT/OT > ARF  - If BP and HR remain stable overnight, will likely be medically ready for DC to ARF on 25.     Severe Mitral Stenosis   - Noted on Echo. Likely the etiology for fatigue and dyspnea over the last several months   - Cardiology following   - Will need to follow up with Dr. Jatinder Santana ([facility]) to discuss long-term management     Type 2 diabetes mellitus with nephropathy / Type 2 diabetes mellitus with diabetic polyneuropathy, with long-term current use of insulin POA:   - Recent A1c 6.4 in 2024. Repeat A1c 7.3%  - Monitor BG. DM diet.   - Complex home regimen with 5+ insulin injections per day. On lantus and humalog at this time. She desires an easier home regimen, may be better off with lantus instead of NPH for less total injections. Op follow up with endocrinology for considerations of CGM and insulin pump.   - Continue sliding scale     Leukocytosis   - Suspect reactive due to above   - Resolved without abx      Coronary artery disease involving native coronary artery without angina pectoris / Hypercholesterolemia / HTN POA:   - Troponin low.   - Continue Lipitor, Ranexa     Hypothyroidism due to acquired atrophy of thyroid POA:   - Continue Levothyroxine     CKD (chronic kidney disease) stage 4, GFR 15-29 ml/min 
    Hospitalist Progress Note      NAME:  Cuca Padron   :  1945  MRM:  [id number]    Date/Time: 1/10/2025  7:49 AM           Assessment / Plan:     79 y.o. female who is presenting for a fall.     Atrial flutter with rapid ventricular response POA:   - Unclear chronicity. Echo with normal EF, severe mitral stenosis   - Metoprolol 75 mg BID and diltiazem 60 mg q6h, Wean dilt drip  - H/o falls. Not a candidate for watchman given mitral disease. Started on warfarin, INR goal 2-3. Heparin bridge. Pharmacy to dose   - Cardiology consulted, CAMRON 25 with LV thrombus and cardioversion not perfromed   - Will need Op cardiology follow up     Severe Mitral Stenosis   - Noted on Echo. Likely the etiology for fatigue and dyspnea over the last several months   - Cardiology following   - Will need to follow up with Dr. Jatinder Santana ([facility]) to discuss long-term management     Type 2 diabetes mellitus with nephropathy / Type 2 diabetes mellitus with diabetic polyneuropathy, with long-term current use of insulin POA:   - Recent A1c 6.4 in 2024. Repeat A1c 7.3%  - Monitor BG. DM diet.   - Complex home regimen with 5+ insulin injections per day. On lantus and humalog at this time. She desires an easier home regimen, may be better off with lantus instead of NPH for less total injections. Op follow up with endocrinology for considerations of CGM and insulin pump.   - Continue sliding scale     Leukocytosis   - Suspect reactive due to above   - Resolved without abx      Coronary artery disease involving native coronary artery without angina pectoris / Hypercholesterolemia / HTN POA:   - Troponin low.   - Continue Lipitor, Ranexa     Hypothyroidism due to acquired atrophy of thyroid POA:   - Continue Levothyroxine     CKD (chronic kidney disease) stage 4, GFR 15-29 ml/min POA:   - At baseline      Anemia in chronic kidney disease POA:   - At baseline. Iron panel normal.   - Continue PO iron      Idiopathic peripheral 
  Physician Progress Note      PATIENT:               ALLISON AHN  CSN #:                  [id number]  :                       1945  ADMIT DATE:       2025 11:59 PM  DISCH DATE:  RESPONDING  PROVIDER #:        Wanda Rodriguez MD          QUERY TEXT:    Good morning,    Patient admitted with atrial flutter with RVR, noted to have suspected atrial   fibrillation and patient was treated with Eliquis x 1 dose and Coumadin.    If possible, please document in progress notes and discharge summary if you   are evaluating and/or treating any of the following:?  ?  The medical record reflects the following:    Risk Factors: 79 yr old female, CAD, DM II, HTN    Clinical Indicators: from  EP consult: \"Anticoagulation She has history   of 2-3 falls over the past few months.  She is not a candidate for Watchman   device given her severe mitral stenosis.  She needs to be on warfarin for oral   anticoagulation with INR goal of 2-3. I discussed risk and benefits of   anticoagulation in detail with patient and daughter.  They agreed to start.   She received 1 dose of Eliquis 5 mg this morning.  Discontinued any further   doses.  I switched her to heparin and started warfarin\":    Treatment: Eliquis, Coumadin, EP consult      Thank you,  Nohelia Aguero RN, CDI  Options provided:  -- Secondary hypercoagulable state in a patient with atrial fibrillation  -- Other - I will add my own diagnosis  -- Disagree - Not applicable / Not valid  -- Disagree - Clinically unable to determine / Unknown  -- Refer to Clinical Documentation Reviewer    PROVIDER RESPONSE TEXT:    This patient has secondary hypercoagulable state in a patient with atrial   fibrillation.    Query created by: Nohelia Aguero on 2025 10:45 AM      Electronically signed by:  Wanda Rodriguez MD 2025 1:00 PM          
Attending attestation:  Patient seen and examined. Discussed with NP. See my separate note.                                                        [facility]  [address], [place], [place] [address] [phone]      Hospitalist  Progress Note      NAME:       Cuca Padron   :        1945  MRM:        [id number]    Date of service: 2025      Subjective: Patient seen and examined by me. Patient admitted with atrial flutter with RVR. She continues to feel weak and has had episodes of a.fib with and elevated HR. She has been accepted to [facility]     Objective:    Vital Signs:    /76   Pulse 86   Temp 98.4 °F (36.9 °C) (Oral)   Resp 17   Ht 1.676 m (5' 6\")   Wt 90.7 kg (200 lb)   SpO2 98%   BMI 32.28 kg/m²        Intake/Output Summary (Last 24 hours) at 2025 1556  Last data filed at 2025 2315  Gross per 24 hour   Intake 1560 ml   Output --   Net 1560 ml        Current inpatient medications reviewed:  Current Facility-Administered Medications   Medication Dose Route Frequency    Warfarin HOLD DOSE TONIGHT 2025   Oral RX Placeholder    bisacodyl (DULCOLAX) suppository 10 mg  10 mg Rectal Daily PRN    polyethylene glycol (GLYCOLAX) packet 17 g  17 g Oral BID    sennosides-docusate sodium (SENOKOT-S) 8.6-50 MG tablet 2 tablet  2 tablet Oral Daily    melatonin tablet 3 mg  3 mg Oral Nightly    traZODone (DESYREL) tablet 50 mg  50 mg Oral Nightly PRN    dilTIAZem (CARDIZEM CD) extended release capsule 240 mg  240 mg Oral Daily    enoxaparin (LOVENOX) injection 90 mg  1 mg/kg SubCUTAneous Q24H    insulin glargine (LANTUS) injection vial 14 Units  14 Units SubCUTAneous Nightly    gabapentin (NEURONTIN) capsule 200 mg  200 mg Oral BID    insulin lispro (HUMALOG,ADMELOG) injection vial 5 Units  5 Units SubCUTAneous TID WC    metoprolol tartrate (LOPRESSOR) tablet 75 mg  75 mg Oral TID    insulin lispro 
[facility]: 01/13/25   Consult for Warfarin Dosing by Pharmacy by Dr. Li  Consult provided for this 79 y.o.F , for indication of afib   Day of Therapy: 6    Dose to achieve an INR goal of 2-3    A/Plan:  Continue to trend INR, CBC labs  One time dose of warfarin 7.5 mg ordered for today prior to discharge  For discharging warfarin regimen -- would consider dosing patient 6 mg daily (patient to discharge to rehab)  Can adjust dosing regimen following INR labs once discharged    Significant drug interactions, such as enoxaparin: lovenox (bridge)   PT/INR Lab Results   Component Value Date/Time    INR 1.5 01/13/2025 02:24 AM      Platelets Lab Results   Component Value Date/Time     01/13/2025 02:24 AM      H/H Lab Results   Component Value Date/Time    HGB 8.5 01/13/2025 02:24 AM        Pharmacy to follow daily and will provide subsequent Warfarin dosing based on clinical status.  Abelino Garcia [facility]) Contact information [phone]  
[facility] Pharmacy Dosing Services: 01/12/25   Consult for Warfarin Dosing by Pharmacy by Dr. Li  Consult provided for this 79 y.o.F , for indication of afib   Day of Therapy: 5    Dose to achieve an INR goal of 2-3    Order entered for  Warfarin  7.5 (mg) ordered to be given today at 18:00.   Follow INR trend, should start to see INR increase, counts stable    Significant drug interactions, such as enoxaparin: lovenox (bridge)   PT/INR Lab Results   Component Value Date/Time    INR 1.2 01/12/2025 03:10 AM      Platelets Lab Results   Component Value Date/Time     01/12/2025 03:10 AM      H/H Lab Results   Component Value Date/Time    HGB 8.2 01/12/2025 03:10 AM        Pharmacy to follow daily and will provide subsequent Warfarin dosing based on clinical status.  Abelino Garcia [facility]) Contact information [phone]      
[facility] Pharmacy Dosing Services: 01/14/25   Consult for Warfarin Dosing by Pharmacy by Dr. Li  Consult provided for this 79 y.o.F , for indication of afib   Day of Therapy: 5    Dose to achieve an INR goal of 2-3    A/Plan:  Continue to trend INR, CBC labs  Jump up in INR from 1.5 to 2.5, will HOLD DOSE TONIGHT  Trend level tmrw and consider re-dosing at reduced rate pending lab results    Significant drug interactions, such as enoxaparin: lovenox (bridge)   PT/INR Lab Results   Component Value Date/Time    INR 2.5 01/14/2025 04:13 AM      Platelets Lab Results   Component Value Date/Time     01/13/2025 01:16 PM      H/H Lab Results   Component Value Date/Time    HGB 8.7 01/13/2025 01:16 PM        Pharmacy to follow daily and will provide subsequent Warfarin dosing based on clinical status.  Abelino Garcia [facility]) Contact information [phone]      
[facility] Pharmacy Dosing Services:  Consult for Warfarin Dosing by Pharmacy by Dr. Li/Isra  Consult provided for this 79 y.o. female for indication of Afib with RVR; Plan for CAMRON and cardioversion 1/9/25.   Day of Therapy 1  Dose to achieve an INR goal of 2-3; Current INR = 1     Order entered for Warfarin 5(mg) ordered to be given today at 18:00.     Patient received Apixaban 5mg PO dose x 1 at 1021 this am. Confirmed start time of Warfarin and bridge Heparin with Dr. Dhaliwal. He would like Warfarin to start at 1800 and Heparin to start at 1900 to minimize time of subtherapeutic anticoag. He is okay with several hour overlap of Heparin and recent Apixaban.  Daily INR ordered.     Significant drug interactions, such as enoxaparin: IV Heparin   PT/INR Lab Results   Component Value Date/Time    INR 1.0 01/08/2025 03:30 PM      Platelets Lab Results   Component Value Date/Time     01/08/2025 03:30 PM      H/H Lab Results   Component Value Date/Time    HGB 8.9 01/08/2025 03:30 PM        Pharmacy to follow daily and will provide subsequent Warfarin dosing based on clinical status.  Carleen Saeed, [facility]) Contact information [phone]     
[facility] Pharmacy Dosing Services: 1/10/25   Consult for Warfarin Dosing by Pharmacy by Dr. Li  Consult provided for this 79 y.o.F , for indication of afib   Day of Therapy 3  Dose to achieve an INR goal of 2-3    Order entered for  Warfarin  5 (mg) ordered to be given today at 18:00.   Follow INR trend, should start to see INR increase, counts stable.     Significant drug interactions, such as enoxaparin: heparin drip   PT/INR Lab Results   Component Value Date/Time    INR 1.1 01/10/2025 04:10 AM      Platelets Lab Results   Component Value Date/Time     01/10/2025 04:10 AM      H/H Lab Results   Component Value Date/Time    HGB 8.9 01/10/2025 04:10 AM        Pharmacy to follow daily and will provide subsequent Warfarin dosing based on clinical status.  GERMAN WEAVER [facility]) Contact information [phone]      
Occupational Therapy: hold    Chart reviewed in preparation for OT evaluation.  Note patient admitted for GLF and Afib w/ RVR.  Note resting HR remains 120s-130s and she may require CAMRON/DCCV.  Will hold OT at this time and will follow-up as able and medically appropriate.    Saúl Ly, OTR/L  
Occupational Therapy: hold    Consulted with RN, who advised that patient is still tachycardic and to hold therapy evaluations until after planned CAMRON/DCCV.  Will follow-up as able and medically appropriate.    Saúl Ly OTR/L      
Occupational/Phyiscal Therapy note: Noted pt with RRT this afternoon and RN stating pt seems tired and lethargic. Will defer today and attempt tx tomorrow.  
Patient seen and examined.  Full note to follow.    Remains in atrial flutter with ventricular rate has improved now as patient is now in 3:1 AV conduction.  Previously ventilated was higher during 2:1 AV conduction.    Plan to continue rate control approach.    Started diltiazem short-acting 60 mg Q6 hourly.    Wean down diltiazem drip as tolerated.  Continue metoprolol tartrate 75 mg twice daily  Continue heparin drip and warfarin      
Pharmacy Dosing Services: 1/8/2025    Pharmacist Renal Dosing  Note for Lovenox   Physician Dr. Rodriguez    Indication: AF /Aflutter    Lovenox adjusted to 1 mg/kg (90 mg) every 24 hours for CrCl <30 ml/min    Previous Regimen 90 mg BID   Serum Creatinine Lab Results   Component Value Date/Time    CREATININE 2.59 01/08/2025 12:37 AM      Creatinine Clearance Estimated Creatinine Clearance: 19 mL/min (A) (based on SCr of 2.59 mg/dL (H)).   BUN Lab Results   Component Value Date/Time    BUN 63 01/08/2025 12:37 AM         Thanks,   Vineet Lima, PharmD    
Physical Therapy: hold     Consulted with RN, who advised that patient is still tachycardic and to hold therapy evaluations until after planned CAMRON/DCCV.  Will follow-up as able and medically appropriate.     Courtney Juárez, PT, DPT  
[facility] Pharmacy Dosing Services:  Consult for Warfarin Dosing by Pharmacy by Dr. Li/Isra  Consult provided for this 79 y.o. female for indication of Afib with RVR; Plan for CAMRON and cardioversion 1/9/25.   Day of Therapy - 2  Dose to achieve an INR goal of 2-3      Order entered for Warfarin 5 mg to be given today at 18:00.       Significant drug interactions, such as enoxaparin: IV Heparin   PT/INR Lab Results   Component Value Date/Time    INR 1.1 01/09/2025 04:49 AM      Platelets Lab Results   Component Value Date/Time     01/09/2025 04:48 AM      H/H Lab Results   Component Value Date/Time    HGB 9.0 01/09/2025 04:48 AM        Pharmacy to follow daily and will provide subsequent Warfarin dosing based on clinical status.  Chance Gongora, [facility]) Contact information [phone]       
Rapid Response called at 1303    Responded to rapid response at 1303 for lethargy. Patient was working with teach attempting to stand when patient fell back on the bed. Patient is alert and oriented x4. Reports feeling weak but no focal deficits. VSS. Will order CBC and CMP    Provider at bedside: No  Interventions ordered: labs  Sepsis suspected: No  Transfer to higher level of care: No    Rapid ended at 1314    RRT RN assisted with transport to accepting unit: N/A    
Spiritual Health History and Assessment/Progress Note  [facility]    Rituals, Rites and Sacraments,  ,  ,      Name: Cuca Padron MRN: [id number]    Age: 79 y.o.     Sex: female   Language: English   Moravian: Taoist   Atrial flutter with rapid ventricular response (HCC)     Date: 1/14/2025            Total Time Calculated: 5 min              Spiritual Assessment began in [facility]4 MULTI-SPECIALTY ORTHOPEDICS 2        Referral/Consult From: Clergy/   Encounter Overview/Reason: Rituals, Rites and Sacraments  Service Provided For: Patient    Cora, Belief, Meaning:   Patient is connected with a cora tradition or spiritual practice  Family/Friends are connected with a cora tradition or spiritual practice      Importance and Influence:  Patient has spiritual/personal beliefs that influence decisions regarding their health  Family/Friends have spiritual/personal beliefs that influence decisions regarding the patient's health    Community:  Patient is connected with a spiritual community  Family/Friends are connected with a spiritual community:    Assessment and Plan of Care:     Patient Interventions include: Provided sacramental/Jain ritual  Family/Friends Interventions include: Provided sacramental/Jain ritual and Other: declined    Patient Plan of Care: Spiritual Care available upon further referral  Family/Friends Plan of Care: Spiritual Care available upon further referral    Electronically signed by Chaplain ALBERTO on 1/14/2025 at 5:05 PM     [facility] visit.  Mrs. Sakshi Padron is Taoist. She was sitting up in bed eating lunch.  Prayer and communion offered. Her daughter who was visiting joined in the prayer and declined communion. Mrs. Padron lives with her other daughter.  She gave this  a whole history of living in [place] and moving to [place].       Sr. APRIL Mendoza, RN, ACSW, LCSW   Page:  
Spiritual Health History and Assessment/Progress Note  [facility]    Rituals, Rites and Sacraments,  ,  ,      Name: Cuca Padron MRN: [id number]    Age: 79 y.o.     Sex: female   Language: English   Temple: Tenriism   Atrial flutter with rapid ventricular response (HCC)     Date: 1/9/2025            Total Time Calculated: 5 min              Spiritual Assessment continued in SF A4 INTENSIVE CARE UNIT        Referral/Consult From: Clergy/   Encounter Overview/Reason: Rituals, Rites and Sacraments  Service Provided For: Patient    Cora, Belief, Meaning:   Patient is connected with a cora tradition or spiritual practice  Family/Friends are connected with a cora tradition or spiritual practice      Importance and Influence:  Patient has spiritual/personal beliefs that influence decisions regarding their health  Family/Friends have spiritual/personal beliefs that influence decisions regarding the patient's health    Community:  Patient is connected with a spiritual community  Family/Friends Other: unknown    Assessment and Plan of Care:     Patient Interventions include: Provided sacramental/Muslim ritual  Family/Friends Interventions include: Other: declined    Patient Plan of Care: Spiritual Care available upon further referral  Family/Friends Plan of Care: Spiritual Care available upon further referral    Electronically signed by Chaplain ALBERTO on 1/9/2025 at 2:56 PM     [facility].  Mrs. Sakshi Padron was in bed eating her lunch which she said was late;  She is Tenriism and attends [employer] when she can. Three family members were with her and declined communion but joined in prayer with us as Mrs. Sakshi Padron received prayer and communion.     Sr. APRIL Mendoza, RN, ACSW, LCSW   Page:  [phone]  
Spiritual Health History and Assessment/Progress Note  [facility]    Initial Encounter,  ,  ,      Name: Cuca Padron MRN: [id number]    Age: 79 y.o.     Sex: female   Language: English   Episcopal: Anabaptist   Atrial flutter with rapid ventricular response (HCC)     Date: 1/8/2025            Total Time Calculated: 25 min              Spiritual Assessment began in SF A4 INTENSIVE CARE UNIT            Encounter Overview/Reason: Initial Encounter  Service Provided For: Patient and family together    Cora, Belief, Meaning:   Patient is connected with a cora tradition or spiritual practice and has beliefs or practices that help with coping during difficult times - Anabaptist   Family/Friends are connected with a cora tradition or spiritual practice and have beliefs or practices that help with coping during difficult times      Importance and Influence:  Patient has spiritual/personal beliefs that influence decisions regarding their health  Family/Friends have spiritual/personal beliefs that influence decisions regarding the patient's health    Community:  Patient feels well-supported. Support system includes: Children  Family/Friends feel well-supported. Support system includes: Extended family    Assessment and Plan of Care:   Spiritual Consult: reviewed chart prior to meeting Pt bedside. Pt is awake, reclined in bed, and has her daughter present with her. Ms. Sakshi Noonan greets  and says she is feeling much better. Pt has 3 daughters and lives with her daughter present. She seems sad in the waiting on test results and not knowing how long she will be here for. She seems to cope by the love and support of family. She expressed being Anabaptist but no home parish of her own.  listened empathetically and shared the Anabaptist services available for her here. Neither one expressed any needs at this time and thanked  for coming by.     Patient Interventions include: 
[facility] Dosing Services: 1/10/25   Consult for Warfarin Dosing by Pharmacy by Dr. Li  Consult provided for this 79 y.o.F , for indication of afib   Day of Therapy: 4    Dose to achieve an INR goal of 2-3    Order entered for  Warfarin  7.5 (mg) ordered to be given today at 18:00.   Follow INR trend, should start to see INR increase, counts stable.     Significant drug interactions, such as enoxaparin: heparin drip   PT/INR Lab Results   Component Value Date/Time    INR 1.1 01/11/2025 05:20 AM      Platelets Lab Results   Component Value Date/Time     01/11/2025 05:20 AM      H/H Lab Results   Component Value Date/Time    HGB 8.5 01/11/2025 05:20 AM        Pharmacy to follow daily and will provide subsequent Warfarin dosing based on clinical status.  [facility]) Contact information [phone]        
            Medications Reviewed: (see below)    Lab Data Reviewed: (see below)    ______________________________________________________________________    Medications:     Current Facility-Administered Medications   Medication Dose Route Frequency    dilTIAZem 100 mg in sodium chloride 0.9 % 100 mL infusion ([facility])  2.5-15 mg/hr IntraVENous Continuous    sodium chloride flush 0.9 % injection 5-40 mL  5-40 mL IntraVENous 2 times per day    sodium chloride flush 0.9 % injection 5-40 mL  5-40 mL IntraVENous PRN    0.9 % sodium chloride infusion   IntraVENous PRN    ondansetron (ZOFRAN-ODT) disintegrating tablet 4 mg  4 mg Oral Q8H PRN    Or    ondansetron (ZOFRAN) injection 4 mg  4 mg IntraVENous Q6H PRN    polyethylene glycol (GLYCOLAX) packet 17 g  17 g Oral Daily PRN    acetaminophen (TYLENOL) tablet 650 mg  650 mg Oral Q6H PRN    Or    acetaminophen (TYLENOL) suppository 650 mg  650 mg Rectal Q6H PRN    insulin lispro (HUMALOG,ADMELOG) injection vial 0-4 Units  0-4 Units SubCUTAneous 4x Daily AC & HS    glucose chewable tablet 16 g  4 tablet Oral PRN    dextrose bolus 10% 125 mL  125 mL IntraVENous PRN    Or    dextrose bolus 10% 250 mL  250 mL IntraVENous PRN    glucagon injection 1 mg  1 mg SubCUTAneous PRN    dextrose 10 % infusion   IntraVENous Continuous PRN    ascorbic acid (VITAMIN C) tablet 250 mg  250 mg Oral Daily    atorvastatin (LIPITOR) tablet 20 mg  20 mg Oral Daily    ferrous sulfate (IRON 325) tablet 325 mg  325 mg Oral Daily before dinner    gabapentin (NEURONTIN) capsule 100 mg  100 mg Oral BID    levothyroxine (SYNTHROID) tablet 100 mcg  100 mcg Oral QAM AC    ranolazine (RANEXA) extended release tablet 500 mg  500 mg Oral TID            Lab Review:     Recent Labs     01/08/25 0037   WBC 11.9*   HGB 9.8*   HCT 28.3*   *     Recent Labs     01/08/25 0037      K 3.8   CL 96*   CO2 29   BUN 63*   ALT 25     No components found for: \"GLPOC\"        
   Arrhythmia 2006, 2008    PALPITATIONS,,CARDIAC CATH    Arthritis     OSTEO    CAD (coronary artery disease)     BLOCKAGES BILATERAL CAROTID, SAW MD 1 MONTH AGO    Carotid artery stenosis     Chronic back pain     Chronic kidney disease     Chronic pain     LOWER BACK    Depression     Diabetes (HCC)     TYPE 2, INSULIN    Hearing loss     Hypercholesterolemia     Hypertension     Hypothyroidism 12/29/2011    Macular degeneration 9/18/2015    Neuropathy     Osteoporosis     Pulmonary embolism (HCC)     Pulmonary hypertension, mild (HCC) 2006    Restrictive airway disease     Supraumbilical hernia 12/12/2022    Thromboembolus (HCC) 2009    LEFT KNEE AFTER REPLACEMENT    Thyroid disease     HYPOTHYROID    Unspecified sleep apnea     USES CPAP     Past Surgical History:   Procedure Laterality Date    ANESTH,SURGERY OF SHOULDER      CARDIAC CATHETERIZATION  2006, 2008    2 TIMES    COLONOSCOPY      2003;neg    COLONOSCOPY N/A 12/22/2023    COLONOSCOPY DIAGNOSTIC performed by Fay Flores MD at [facility] ENDOSCOPY    EYE SURGERY      JOINT REPLACEMENT      TONSILLECTOMY  1951    TOTAL KNEE ARTHROPLASTY  2009    LEFT    UPPER GASTROINTESTINAL ENDOSCOPY N/A 12/22/2023    EGD ESOPHAGOGASTRODUODENOSCOPY, COLONOSCOPY performed by Fay Flores MD at [facility] ENDOSCOPY     Family Hx: family history includes Cancer in her father; Diabetes in her father and mother; Heart Disease in her father and mother; High Blood Pressure in her father and mother; Hypertension in her sister, sister, and sister; Prostate Cancer in her father; Stroke in her father and mother; Vision Loss in her mother.  Allergies   Allergen Reactions    Levofloxacin Other (See Comments)     Prolonged QT interval.          OBJECTIVE:  Wt Readings from Last 3 Encounters:   01/09/25 90.7 kg (200 lb)   01/01/25 90.7 kg (200 lb)   12/30/24 90.7 kg (200 lb)       Intake/Output Summary (Last 24 hours) at 1/12/2025 0999  Last data filed at 1/12/2025 0300  Gross per 24 
providers - all reviewed by me on: 1/13/2025    Assessment and Plan:    Atrial flutter with rapid ventricular response POA: admitted with atypical flutter with RVR. Has episodes of atrial fibrillation.  rate better controlled. Follows with Cardiology. Continue Diltiazem, Metoprolol and Warfarin. PT, OT as tolerated. CM working on discharge planning. She has been accepted to [facility] rehab with planned discharge today.     Near-Syncope: Patient was working with nursing to attempt to  stand at bedside and she felt weakness, causing her to fall back onto the bed. She had no focal deficits. Rapid response called. She is feeling better now. Will monitor and plan for DC to [facility] 1/14    Thrombus of left atrial appendage POA: suspected on Echocardiogram. Not a candidate for Watchman device due to severe mitral stenosis. Continue Enoxaparin and Warfarin. Goal INR 2-3.      Type 2 diabetes mellitus with nephropathy / Type 2 diabetes mellitus with diabetic polyneuropathy, with long-term current use of insulin POA: recent A1c 6.4 in 8/2024. BG overall stable. Continue Lantus, pre-meal Lispro, Sliding scale.      Coronary artery disease involving native coronary artery without angina pectoris / Hypercholesterolemia / HTN POA: unclear if she has obstructive CAD. Troponin low. Continue Lipitor, Ranexa, Metoprolol.      Ophthalmic herpes zoster POA: Completed valtrex. Continue Gabapentin for post herpetic neurlagia.     Hypothyroidism due to acquired atrophy of thyroid POA: TSH is normal. Continue Levothyroxine     CKD (chronic kidney disease) stage 4, GFR 15-29 ml/min POA: monitor renal function.      Anemia in chronic kidney disease POA: Hgb overall stable. Continue Ferrous sulfate     Idiopathic peripheral neuropathy POA: Continue Gabapentin      Care Plan discussed with: Patient, Family, Nursing, CM     Prophylaxis:  Coumadin                Expected Disposition:  SNF/LTC 1/13 or 1/14    PCP:      JOANNA Gil, 
01/10/25 1200 01/10/25 1300 01/10/25 1400   BP: (!) 125/58 (!) 125/58 122/80 (!) 116/56   Pulse: 66 67 75 64   Resp:  14 18 18   Temp:  98 °F (36.7 °C)     TempSrc:  Oral     SpO2:  98% 96% 99%   Weight:       Height:           Body mass index is 32.28 kg/m².      Data Review:     Radiology:   XR Results (most recent):  @BSHSILASTIMGCAT(NUD2000:1)@  CT Result (most recent):  CT CHEST ABDOMEN PELVIS WO CONTRAST 01/08/2025    Narrative  Clinical history: Fall, right sided pain  INDICATION:   Fall, right sided pain  COMPARISON: 2017    TECHNIQUE:  Thin axial images were obtained through the chest, abdomen and pelvis. Coronal  and sagittal reconstructions were generated. Oral contrast was not administered.    CT dose reduction was achieved through use of a standardized protocol tailored  for this examination and automatic exposure control for dose modulation.  Adaptive statistical iterative reconstruction (ASIR) was utilized.    The absence of IV contrast significantly limits sensitivity for the presence of  visceral or vascular injury, presence of mucosal abnormality, solid organ  lesion, neoplasm.    FINDINGS:    SUPRACLAVICULAR REGION: No supraclavicular adenopathy.  MEDIASTINUM:  Aortic atherosclerotic change. Cardiomegaly and coronary vascular  calcifications. No hilar or mediastinal lymphadenopathy. No esophageal mass. No  endotracheal or endobronchial mass.  CORONARY ARTERY CALCIUM: present  PLEURA/LUNGS:: No effusion or pneumothorax. There is centrilobular emphysema.  SOFT TISSUE/ AXILLA: Unremarkable.  LIVER/GALLBLADDER: Left hepatic lesion is chronic in nature, likely benign.  Gallbladder sludge. There is no intrahepatic duct dilatation. The CBD is not  dilated.  SPLEEN/PANCREAS: No mass.. There is no pancreatic mass. There is no pancreatic  duct dilatation. There is no evidence of splenomegaly.  ADRENALS/KIDNEYS: Left adrenal adenoma is redemonstrated. Left renal cyst. There  is no adrenal mass. There is no 
\"BNPPOC\", \"PROBNP\", \"NTPROBNP\", \"BNPNT\", \"PROBNPEXT\"      Current meds:   sodium chloride      dextrose          sodium chloride    dextrose     warfarin placeholder: dosing by pharmacy, , Oral, RX Placeholder    polyethylene glycol, 17 g, Oral, BID    sennosides-docusate sodium, 2 tablet, Oral, Daily    melatonin, 3 mg, Oral, Nightly    dilTIAZem, 240 mg, Oral, Daily    enoxaparin, 1 mg/kg, SubCUTAneous, Q24H    insulin glargine, 14 Units, SubCUTAneous, Nightly    gabapentin, 200 mg, Oral, BID    insulin lispro, 5 Units, SubCUTAneous, TID WC    metoprolol tartrate, 75 mg, Oral, TID    insulin lispro, 0-4 Units, SubCUTAneous, 4x Daily AC & HS    sodium chloride flush, 5-40 mL, IntraVENous, 2 times per day    ascorbic acid, 250 mg, Oral, Daily    atorvastatin, 20 mg, Oral, Daily    ferrous sulfate, 325 mg, Oral, Daily before dinner    levothyroxine, 100 mcg, Oral, QAM AC    ranolazine, 500 mg, Oral, Daily    warfarin placeholder: dosing by pharmacy, , Oral, RX Placeholder         David Li MD    [facility]  [address] [place] [address], [address] [place] [address]  [phone]      CC:JOANNA Gil MD

## 2025-01-14 NOTE — CONSULTS
[facility]  DIABETES MANAGEMENT CONSULT    Consulted by Provider for advanced nursing evaluation and care for inpatient blood glucose management.    Evaluation and Action Plan   Cuca Padron is an 80 yo female with T2DM and multiple medical issues, who was admitted after being \"sick\" for last week and a half, had a recent fall at home,and came in with right sided pain. Daughter (at bedside) states patient recently had shingles in her eyes, causing significant pain. She also states patient's BG has been \"through the roof\" the last few days. Patient sees endocrinologist, Dr. Cardoso. Patient has been on insulin regimen only for her T2DM for almost 20 years. She does have significant CKD. Last A1c in August was 6.4%.     Admission . She has been started on correctional insulin. She is eating fairly well.  this am. Will start renal dose basal/bolus/correctional insulin for goal -180.    Blood glucose pattern    Significant diabetes-related events over the past 24-72 hours  A1C last one 6.4% on 24  Fasting B  Pre-prandial: 195-226  Basal: 0  Bolus: 0  Correction: 4 units   Total daily insulin dose in the last 24 hours: 4 units      Management Rationale Action Plan   Medication   Basal needs Using 0.15 units/kg/D based on weight  Lantus 12 units nightly   Nutritional needs Covers carb load in meals Humalog 4 units with meals   Corrective insulin Using lose dose scale based on weight    Lab [x]        Hemoglobin A1c   Additional orders  Carb consistent diet (60g CHO/meal)       Diabetes Discharge Plan   Medication: TBD     Referral  []        Outpatient diabetes education   Additional orders            Initial Presentation   Cuca Padron is a 79 y.o. female who presented to the ED on  after fall and c/o right sided pain.  LAB:, A1c pending, creat 2.59, troponin 54, WBC 11.9  CT chest/abd:IMPRESSION:  No acute process is identified in 
[facility]  DIABETES MANAGEMENT CONSULT    Consulted by Provider for advanced nursing evaluation and care for inpatient blood glucose management.    Evaluation and Action Plan   Cuca Padron is an 78 yo female with T2DM and multiple medical issues, who was admitted after being \"sick\" for last week and a half, had a recent fall at home,and came in with right sided pain. Daughter (at bedside) states patient recently had shingles in her eyes, causing significant pain. She also states patient's BG has been \"through the roof\" the last few days. Patient sees endocrinologist, Dr. Cardoso. Patient has been on insulin regimen only for her T2DM for almost 20 years. She does have significant CKD. Last A1c in August was 6.4%.      - Patient resting in bed. Family at bedside. BG has ranged from 126-258 last few days.  and 198 last 2 days. Will increase basal dose slightly again this evening. Nurse reports patient eating about 50% of meals. Will continue current bolus dose. Awaiting IPR.    Blood glucose pattern    Significant diabetes-related events over the past 24-72 hours  A1C last one 6.4% on 24  Fasting B  Pre-prandial:126-167  Basal: 14 units   Bolus: 5 units with meals  Correction: 3 units units   Total daily insulin dose in the last 24 hours:  32 units      Management Rationale Action Plan   Medication   Basal needs Using 0.15 units/kg/D based on weight  Lantus 16 units nightly   Nutritional needs Covers carb load in meals Humalog 5 units with meals   Corrective insulin Using lose dose scale based on weight    Lab [x]        Hemoglobin A1c   Additional orders  Carb consistent diet (60g CHO/meal)       Diabetes Discharge Plan   Medication: TBD, likely PTA insulins and follow up with endo     Referral  []        Outpatient diabetes education   Additional orders            Initial Presentation   Cuca Padron is a 79 y.o. female who presented to the ED on 
[facility]  DIABETES MANAGEMENT CONSULT    Consulted by Provider for advanced nursing evaluation and care for inpatient blood glucose management.    Evaluation and Action Plan   Cuca Padron is an 78 yo female with T2DM and multiple medical issues, who was admitted after being \"sick\" for last week and a half, had a recent fall at home,and came in with right sided pain. Daughter (at bedside) states patient recently had shingles in her eyes, causing significant pain. She also states patient's BG has been \"through the roof\" the last few days. Patient sees endocrinologist, Dr. Cardoso. Patient has been on insulin regimen only for her T2DM for almost 20 years. She does have significant CKD. Last A1c in August was 6.4%.     1/10 - Patient's BG settling down a bit with basal/bolus insulin. . Will increase basal dose a bit tonight, but still renally dose due to CKD. Eating well but c/o HA and arm pain this morning (likely r/t shingles). No further needs at this time.     Blood glucose pattern    Significant diabetes-related events over the past 24-72 hours  A1C last one 6.4% on 24  Fasting B  Pre-prandial: 220-302  Basal: 12 units   Bolus: 4 units with meals  Correction: 3 units units   Total daily insulin dose in the last 24 hours:  19 units      Management Rationale Action Plan   Medication   Basal needs Using 0.15 units/kg/D based on weight  Lantus 14 units nightly   Nutritional needs Covers carb load in meals Humalog 4 units with meals   Corrective insulin Using lose dose scale based on weight    Lab [x]        Hemoglobin A1c   Additional orders  Carb consistent diet (60g CHO/meal)       Diabetes Discharge Plan   Medication: TBD, likely PTA insulins and follow up with endo     Referral  []        Outpatient diabetes education   Additional orders            Initial Presentation   Cuca Padron is a 79 y.o. female who presented to the ED on  after fall 
Persistent atypical atrial flutter with RVR  # Suspected history of atrial fibrillation  Her atrial flutter has some atypical features on EKG so is likely atypical.  Additionally he has she has severely dilated left atrium and severe MS so she likely also has atrial fibrillation.  --Will plan to perform CAMRON tomorrow to rule out left atrial appendage thrombus and left atrial body thrombus.  If negative, will plan to perform cardioversion  ----Continue IV diltiazem drip  --Start metoprolol tartrate 50 mg 3 times a day (ordered)  --Post cardioversion, recommend starting amiodarone loading 400 mg twice daily for 3 weeks followed by amiodarone 200 mg daily.  Will need to reassess rate control agents dosing after cardioversion    # Mild aortic stenosis  Mean gradient 16 mmHg  --Serial follow-up as outpatient    # Coronary artery disease  Continue statin    # Diabetes mellitus type 2  Management per ICU team    # Chronic Kidney Disease Stage IV  Lab Results   Component Value Date    CREATININE 2.59 (H) 2025    CREATININE 2.55 (H) 2025      Estimated Creatinine Clearance: 19 mL/min (A) (based on SCr of 2.59 mg/dL (H)).      Thank you so much for the consultation. [facility] will continue to follow along. Please don't hesitate to contact us with any questions or referrals.      []    High complexity decision making was performed  []    Patient is at high-risk of decompensation with multiple organ involvement         Social History     Tobacco Use    Smoking status: Former     Current packs/day: 0.00     Types: Cigarettes     Quit date: 1994     Years since quittin.7    Smokeless tobacco: Never   Vaping Use    Vaping status: Never Used   Substance Use Topics    Alcohol use: Not Currently    Drug use: Never           Cardiac testing  I personally reviewed patient's EKGs and telemetry.    LA Diameter   Date Value Ref Range Status   2025 4.2 cm Final     LA/AO Root Ratio   Date Value Ref

## 2025-01-15 VITALS
HEIGHT: 66 IN | BODY MASS INDEX: 32.14 KG/M2 | TEMPERATURE: 97.9 F | SYSTOLIC BLOOD PRESSURE: 137 MMHG | HEART RATE: 68 BPM | RESPIRATION RATE: 17 BRPM | OXYGEN SATURATION: 100 % | DIASTOLIC BLOOD PRESSURE: 55 MMHG | WEIGHT: 200 LBS

## 2025-01-15 LAB
GLUCOSE BLD STRIP.AUTO-MCNC: 112 MG/DL (ref 65–117)
GLUCOSE BLD STRIP.AUTO-MCNC: 182 MG/DL (ref 65–117)
INR PPP: 3.7 (ref 0.9–1.1)
PROTHROMBIN TIME: 35.4 SEC (ref 9.2–11.2)
SERVICE CMNT-IMP: ABNORMAL
SERVICE CMNT-IMP: NORMAL

## 2025-01-15 PROCEDURE — 6360000002 HC RX W HCPCS: Performed by: STUDENT IN AN ORGANIZED HEALTH CARE EDUCATION/TRAINING PROGRAM

## 2025-01-15 PROCEDURE — 6370000000 HC RX 637 (ALT 250 FOR IP): Performed by: INTERNAL MEDICINE

## 2025-01-15 PROCEDURE — 82962 GLUCOSE BLOOD TEST: CPT

## 2025-01-15 PROCEDURE — 6370000000 HC RX 637 (ALT 250 FOR IP): Performed by: HOSPITALIST

## 2025-01-15 PROCEDURE — 85610 PROTHROMBIN TIME: CPT

## 2025-01-15 PROCEDURE — 6370000000 HC RX 637 (ALT 250 FOR IP): Performed by: STUDENT IN AN ORGANIZED HEALTH CARE EDUCATION/TRAINING PROGRAM

## 2025-01-15 PROCEDURE — 6370000000 HC RX 637 (ALT 250 FOR IP)

## 2025-01-15 PROCEDURE — 2500000003 HC RX 250 WO HCPCS: Performed by: INTERNAL MEDICINE

## 2025-01-15 PROCEDURE — 94761 N-INVAS EAR/PLS OXIMETRY MLT: CPT

## 2025-01-15 RX ORDER — INSULIN GLARGINE 100 [IU]/ML
16 INJECTION, SOLUTION SUBCUTANEOUS NIGHTLY
Status: DISCONTINUED | OUTPATIENT
Start: 2025-01-15 | End: 2025-01-15 | Stop reason: HOSPADM

## 2025-01-15 RX ORDER — INSULIN GLARGINE 100 [IU]/ML
16 INJECTION, SOLUTION SUBCUTANEOUS NIGHTLY
Qty: 5 ADJUSTABLE DOSE PRE-FILLED PEN SYRINGE | Refills: 0 | Status: ON HOLD
Start: 2025-01-15 | End: 2025-02-14

## 2025-01-15 RX ORDER — WARFARIN SODIUM 5 MG/1
5 TABLET ORAL DAILY
Qty: 30 TABLET | Refills: 0 | Status: ON HOLD
Start: 2025-01-16

## 2025-01-15 RX ORDER — METOPROLOL SUCCINATE 100 MG/1
100 TABLET, EXTENDED RELEASE ORAL 2 TIMES DAILY
Qty: 30 TABLET | Refills: 0 | Status: ON HOLD
Start: 2025-01-15 | End: 2025-02-14

## 2025-01-15 RX ADMIN — INSULIN LISPRO 5 UNITS: 100 INJECTION, SOLUTION INTRAVENOUS; SUBCUTANEOUS at 13:33

## 2025-01-15 RX ADMIN — METOPROLOL SUCCINATE 100 MG: 50 TABLET, EXTENDED RELEASE ORAL at 09:04

## 2025-01-15 RX ADMIN — INSULIN LISPRO 1 UNITS: 100 INJECTION, SOLUTION INTRAVENOUS; SUBCUTANEOUS at 13:34

## 2025-01-15 RX ADMIN — FERROUS SULFATE TAB 325 MG (65 MG ELEMENTAL FE) 325 MG: 325 (65 FE) TAB at 15:39

## 2025-01-15 RX ADMIN — POLYETHYLENE GLYCOL 3350 17 G: 17 POWDER, FOR SOLUTION ORAL at 09:06

## 2025-01-15 RX ADMIN — SODIUM CHLORIDE, PRESERVATIVE FREE 10 ML: 5 INJECTION INTRAVENOUS at 09:07

## 2025-01-15 RX ADMIN — SENNOSIDES AND DOCUSATE SODIUM 2 TABLET: 50; 8.6 TABLET ORAL at 09:05

## 2025-01-15 RX ADMIN — RANOLAZINE 500 MG: 500 TABLET, FILM COATED, EXTENDED RELEASE ORAL at 09:04

## 2025-01-15 RX ADMIN — ATORVASTATIN CALCIUM 20 MG: 20 TABLET, FILM COATED ORAL at 09:04

## 2025-01-15 RX ADMIN — OXYCODONE HYDROCHLORIDE AND ACETAMINOPHEN 250 MG: 500 TABLET ORAL at 09:04

## 2025-01-15 RX ADMIN — INSULIN LISPRO 5 UNITS: 100 INJECTION, SOLUTION INTRAVENOUS; SUBCUTANEOUS at 09:07

## 2025-01-15 RX ADMIN — LEVOTHYROXINE SODIUM 100 MCG: 100 TABLET ORAL at 05:35

## 2025-01-15 RX ADMIN — DILTIAZEM HYDROCHLORIDE 240 MG: 120 CAPSULE, COATED, EXTENDED RELEASE ORAL at 09:04

## 2025-01-15 RX ADMIN — GABAPENTIN 200 MG: 100 CAPSULE ORAL at 09:04

## 2025-01-15 RX ADMIN — ENOXAPARIN SODIUM 90 MG: 100 INJECTION SUBCUTANEOUS at 15:39

## 2025-01-15 NOTE — DISCHARGE SUMMARY
CRISTAL HARTMANN [facility] [place]  [address] [place] [address]  Tel: [phone]    Hospital Medicine Discharge Summary    Patient ID:    Cuca Padron  Age:              79 y.o.    : 1945  MRN:             [id number]     PCP: JOANNA Gil MD     Date of Admission: 2025    Date of Discharge:  1/15/2025    Discharge Diagnoses:  Principal Problem:    Atrial flutter with rapid ventricular response (HCC)    Hypothyroidism due to acquired atrophy of thyroid    Type 2 diabetes mellitus with nephropathy (HCC)    Recurrent depression (HCC)    Hypercholesterolemia    Coronary artery disease involving native coronary artery without angina pectoris    Type 2 diabetes mellitus with diabetic polyneuropathy, with long-term current use of insulin (HCC)    Idiopathic peripheral neuropathy    CKD (chronic kidney disease) stage 4, GFR 15-29 ml/min (HCC)    Anemia in chronic kidney disease (CODE)    Ophthalmic herpes zoster    Severe mitral valve stenosis    Atypical atrial flutter (HCC)    Head injury    Thrombus of left atrial appendage    Reason for admission:    Elevated troponin [R79.89]  Atrial flutter with rapid ventricular response (HCC) [I48.92]  Injury of head, initial encounter [S09.90XA]  Fall, initial encounter [W19.XXXA]    Diagnostic testing:    Laboratory data reviewed and independently interpreted:    Recent Labs     25  1316   WBC 7.9 7.5   HGB 8.5* 8.7*   HCT 26.2* 26.7*   RBC 2.89* 2.94*   MCV 90.7 90.8   MCH 29.4 29.6   * 434*     No results found for: \"LACTA\"  Recent Labs     254 25  1316 25  0413 01/15/25  0239    136  --   --    K 4.2 4.0  --   --     102  --   --    CO2 30 28  --   --    GLUCOSE 133* 96  --   --    BUN 77* 78*  --   --    CREATININE 2.60* 2.60*  --   --    CALCIUM 9.5 9.5  --   --    BILITOT  --  0.3  --   --    ALKPHOS  --  82  
more than 30 minutes.    Signed:  Rakesh Tanner MD       1/13/2025   1:38 PM

## 2025-01-15 NOTE — CARE COORDINATION
01/13/25  5:10 PM  CM discussed with patient and daughter at the bedside. They would like to hear from [facility] before agreeing to [facility] IPR. CM has reached out to [facility] liaison. [facility] MD will need to review pt's progress with therapy tomorrow Tuesday before accepting due to pt's rapid response. Patient has agreed for [facility] liaison to call her other daughter not at bedside Mary [phone].          Care Management Progress Note        Reason for Admission:   Elevated troponin [R79.89]  Atrial flutter with rapid ventricular response (HCC) [I48.92]  Injury of head, initial encounter [S09.90XA]  Fall, initial encounter [W19.XXXA]         Patient Admission Status: Inpatient  RUR: 20%  Hospitalization in the last 30 days (Readmission): No.        Transition of care plan:  Patient was discussed in IDR and continues to be medically managed. Patient was anticipated to discharge today. Discharge has been cancelled due to a rapid response.     Dispo: IPR.  If  IPR:  Date FOC offered: 1/11.  Accepting facility: [facility]  Date authorization started with reference number: Authorization is not required.   Date authorization received and expires:     CM has updated [facility] to pt's clinical status.     Discharge plan communicated with patient and/or discharge caregiver: Yes .    Date 1st IMM letter given: 1/8. 2nd IMM: 1/13/25.    Outpatient follow-up.    Transport at discharge: stretcher transport based on pt's progress with therapy.         ___________________________________________   Lily Zhang RN Case Manager  1/13/2025   2:09 PM       
1/9/25  12:12 PM    Care Management Progress Note    Reason for Admission:   Elevated troponin [R79.89]  Atrial flutter with rapid ventricular response (HCC) [I48.92]  Injury of head, initial encounter [S09.90XA]  Fall, initial encounter [W19.XXXA]         Patient Admission Status: Inpatient  RUR: 20%  Hospitalization in the last 30 days (Readmission):  No        Transition of care plan:  Ongoing medical management  Discharge plan: Following for needs- PT/OT unable to evaluate until after plann CAMRON/DCCV  Discharge plan communicated with patient and/or discharge caregiver: Yes    Date 1st IMM letter given: 1/8/25  Outpatient follow-up.  Transport at discharge: Family    CM following for needs.    GALEN Hale  [facility]      Available via [email or web address]      
4:21 PM  CM met with patient and she would like referrals placed with MICHAEL CAMILO and Meri Smith and will make a decision on preference once received acceptances.     01/10/25 1620   Services At/After Discharge   Transition of Care Consult (CM Consult) Acute Rehab   Condition of Participation: Discharge Planning   The Patient and/or Patient Representative was provided with a Choice of Provider? Patient   The Patient and/Or Patient Representative agree with the Discharge Plan? Yes   Freedom of Choice list was provided with basic dialogue that supports the patient's individualized plan of care/goals, treatment preferences, and shares the quality data associated with the providers?  Yes     Carlota Cantrell MSADRIANNA  [facility]      Available via [employer]        1/10/25   4:06 PM    Care Management Progress Note    Reason for Admission:   Elevated troponin [R79.89]  Atrial flutter with rapid ventricular response (HCC) [I48.92]  Injury of head, initial encounter [S09.90XA]  Fall, initial encounter [W19.XXXA]         Patient Admission Status: Inpatient  RUR: 21%  Hospitalization in the last 30 days (Readmission):  No        Transition of care plan:  Ongoing medical management  Discharge plan: PT recommending IPR- needs choices.  Discharge plan communicated with patient and/or discharge caregiver: Yes    Date 1st IMM letter given: 1/8/25  Outpatient follow-up.  Transport at discharge: Daughter    CM noted PT recommendation for IPR. CM to meet with patient and family to discuss.    GALEN Hale  [facility]      Available via [employer]      
@ Case management follow up  Call received from Alberto guzman @ [phone] with [facility] stating they've accepted and would like to reach out to the family.   Met with patient and daughters at bedside, no primary preference for IPR.   Agreed for Alberto to call for possible transition come Monday/Tuesday.  MICHAEL  
Care Management Progress Note        Reason for Admission:   Elevated troponin [R79.89]  Atrial flutter with rapid ventricular response (HCC) [I48.92]  Injury of head, initial encounter [S09.90XA]  Fall, initial encounter [W19.XXXA]         Patient Admission Status: Inpatient  RUR: 22%  Hospitalization in the last 30 days (Readmission):  No        Transition of care plan:  Patient was discussed in IDR and continues to be medically managed.    Dispo: IPR.   If IPR:  Date FOC offered: 1/14/25.  Accepting facility: [facility].  Date authorization started with reference number: Patient does not require insurance authorization.   Date authorization received and expires:     Discharge plan communicated with patient and/or discharge caregiver: Yes  . CM discussed with patient and pt's daughter Mary [phone] who she lives with.     Date 1st IMM letter given: 1/8/25.    Outpatient follow-up.    Transport at discharge: stretcher.         ___________________________________________   Lily Zhang, GUILLE Case Manager  1/14/2025   3:23 PM       
Transition of Care Plan to SNF/Rehab    Communication to Patient/Family:   Met with patient and family and they are agreeable to the transition plan. The Plan for Transition of Care is related to the following treatment goals: Elevated troponin [R79.89]  Atrial flutter with rapid ventricular response (HCC) [I48.92]  Injury of head, initial encounter [S09.90XA]  Fall, initial encounter [W19.XXXA]      The Patient and/or patient representative was provided with a choice of provider and agrees  with the discharge plan.  CM has updated patient, family at bedside, and daughter Sandra. All remain in agreement with dc plan.     Yes [x] No []      A Freedom of choice list was provided with basic dialogue that supports the patient's individualized plan of care/goals and shares the quality data associated with the providers.       Yes [x] No []      SNF/Rehab Transition:  Patient has been accepted to [facility] and meets criteria for admission.       SNF reports auth has been received? []  Medicare 3 night stay satisfied? [x]   Inpatient Admission Dates: 1/8/25 - 1/15/25      Patient will be transported by hospital to home stretcher and expected to leave at 3:30pm (facility request).  Trip booked with [facility] at [facility] under pt's medicare policy.    [x] Packet on chart (if needed)  [x] PCS completed (if applicable)       Communication to SNF/Rehab:  Bedside RN, Kira, has been notified to update the transition plan to the facility and call report ( [phone], room 2144). Accepting physician is Dr. Pendleton. Accepting Md would like a hand off, JOSEY has updated Dr. Tanner.  Discharge information has been updated on the AVS. And communicated to facility via [employer], or CC link.     Discharge instructions to be fax'd to facility via [x] AllScript[employer] [] CCLink      Nursing Please include all hard scripts for controlled substances, med rec and dc summary, and AVS in packet.       Nursing, please discuss the 
concerns/barriers: []Yes, explain: [x]No []Unknown/Not discussed  __________________________________________________________________________    Insurer:   Active Insurance as of 1/7/2025       Primary Coverage       Payor Plan Insurance Group Employer/Plan Group    MEDICARE MEDICARE PART A AND B        Payor Address Payor Phone Number Payor Fax Number Effective Dates    [address] [address] [phone]  4/1/2008 - None Entered    [facility] [address]         Subscriber Name Subscriber Birth Date Member ID       ALLISON AHN 1945 [id number]               Secondary Coverage       Payor Plan Insurance Group Employer/Plan Group    [place] SUPP [id number]       Payor Address Payor Phone Number Payor Fax Number Effective Dates    [address] [address] [phone]  2/1/2019 - None Entered    [facility] [address]         Subscriber Name Subscriber Birth Date Member ID       ALLISON AHN 1945 [id number]                     PCP: JOANNA Gil MD   Address: [address] [place] [address]   Phone number: [phone]    Pharmacy:   [id number] [place], [place] - [address] - P [phone] - F [phone]  [address] [facility] [address]  Phone: [phone] Fax: [phone]    [facility] - [place], [place] - [address] - P [phone] - F [phone]  [address] [address]  Phone: [phone] Fax: [phone]    [id number] [place], [place] - [address] - P [phone] - L [phone]  [address] [facility] [address]  Phone: [phone] Fax: [phone]    DC Transport:  daughter        Transition of care plan:    [x]Unable to determine at this time. Awaiting clinical progress, and disposition recommendations.    [] Home. No assistance required.     [] Home. Pt refused recommended services.    [] Home with family assistance as

## 2025-01-17 ENCOUNTER — HOSPITAL ENCOUNTER (INPATIENT)
Facility: HOSPITAL | Age: 80
LOS: 7 days | Discharge: NURSING, REHAB OR LONG-TERM CARE | DRG: 291 | End: 2025-01-24
Attending: STUDENT IN AN ORGANIZED HEALTH CARE EDUCATION/TRAINING PROGRAM | Admitting: HOSPITALIST
Payer: MEDICARE

## 2025-01-17 ENCOUNTER — APPOINTMENT (OUTPATIENT)
Facility: HOSPITAL | Age: 80
DRG: 291 | End: 2025-01-17
Payer: MEDICARE

## 2025-01-17 DIAGNOSIS — M10.9 GOUT, UNSPECIFIED CAUSE, UNSPECIFIED CHRONICITY, UNSPECIFIED SITE: ICD-10-CM

## 2025-01-17 DIAGNOSIS — G25.3 MYOCLONUS: ICD-10-CM

## 2025-01-17 DIAGNOSIS — R06.02 SHORTNESS OF BREATH: ICD-10-CM

## 2025-01-17 DIAGNOSIS — R06.00 DYSPNEA, UNSPECIFIED TYPE: ICD-10-CM

## 2025-01-17 DIAGNOSIS — I50.31 ACUTE DIASTOLIC CONGESTIVE HEART FAILURE (HCC): ICD-10-CM

## 2025-01-17 DIAGNOSIS — J81.0 ACUTE PULMONARY EDEMA: Primary | ICD-10-CM

## 2025-01-17 PROBLEM — I50.33 ACUTE ON CHRONIC DIASTOLIC CHF (CONGESTIVE HEART FAILURE) (HCC): Status: ACTIVE | Noted: 2025-01-17

## 2025-01-17 LAB
ALBUMIN SERPL-MCNC: 1.8 G/DL (ref 3.5–5)
ALBUMIN/GLOB SERPL: 0.6 (ref 1.1–2.2)
ALP SERPL-CCNC: 57 U/L (ref 45–117)
ALT SERPL-CCNC: 11 U/L (ref 12–78)
ANION GAP SERPL CALC-SCNC: 6 MMOL/L (ref 2–12)
AST SERPL-CCNC: 11 U/L (ref 15–37)
BASOPHILS # BLD: 0.03 K/UL (ref 0–0.1)
BASOPHILS NFR BLD: 0.3 % (ref 0–1)
BILIRUB SERPL-MCNC: 0.4 MG/DL (ref 0.2–1)
BUN SERPL-MCNC: 76 MG/DL (ref 6–20)
BUN/CREAT SERPL: 31 (ref 12–20)
CALCIUM SERPL-MCNC: 6.8 MG/DL (ref 8.5–10.1)
CHLORIDE SERPL-SCNC: 113 MMOL/L (ref 97–108)
CO2 SERPL-SCNC: 21 MMOL/L (ref 21–32)
CREAT SERPL-MCNC: 2.43 MG/DL (ref 0.55–1.02)
DIFFERENTIAL METHOD BLD: ABNORMAL
EOSINOPHIL # BLD: 0.59 K/UL (ref 0–0.4)
EOSINOPHIL NFR BLD: 5.1 % (ref 0–7)
ERYTHROCYTE [DISTWIDTH] IN BLOOD BY AUTOMATED COUNT: 21.5 % (ref 11.5–14.5)
FLUAV RNA SPEC QL NAA+PROBE: NOT DETECTED
FLUBV RNA SPEC QL NAA+PROBE: NOT DETECTED
GLOBULIN SER CALC-MCNC: 3.2 G/DL (ref 2–4)
GLUCOSE BLD STRIP.AUTO-MCNC: 163 MG/DL (ref 65–117)
GLUCOSE SERPL-MCNC: 127 MG/DL (ref 65–100)
HCT VFR BLD AUTO: 24.3 % (ref 35–47)
HGB BLD-MCNC: 8.2 G/DL (ref 11.5–16)
IMM GRANULOCYTES # BLD AUTO: 0.12 K/UL (ref 0–0.04)
IMM GRANULOCYTES NFR BLD AUTO: 1 % (ref 0–0.5)
INR PPP: 1.9 (ref 0.9–1.1)
LYMPHOCYTES # BLD: 0.74 K/UL (ref 0.8–3.5)
LYMPHOCYTES NFR BLD: 6.4 % (ref 12–49)
MAGNESIUM SERPL-MCNC: 1.9 MG/DL (ref 1.6–2.4)
MCH RBC QN AUTO: 29.9 PG (ref 26–34)
MCHC RBC AUTO-ENTMCNC: 33.7 G/DL (ref 30–36.5)
MCV RBC AUTO: 88.7 FL (ref 80–99)
MONOCYTES # BLD: 0.86 K/UL (ref 0–1)
MONOCYTES NFR BLD: 7.5 % (ref 5–13)
NEUTS SEG # BLD: 9.16 K/UL (ref 1.8–8)
NEUTS SEG NFR BLD: 79.7 % (ref 32–75)
NRBC # BLD: 0.06 K/UL (ref 0–0.01)
NRBC BLD-RTO: 0.5 PER 100 WBC
NT PRO BNP: 2145 PG/ML
PLATELET # BLD AUTO: 407 K/UL (ref 150–400)
PMV BLD AUTO: 10.6 FL (ref 8.9–12.9)
POTASSIUM SERPL-SCNC: 3.8 MMOL/L (ref 3.5–5.1)
PROT SERPL-MCNC: 5 G/DL (ref 6.4–8.2)
PROTHROMBIN TIME: 18.9 SEC (ref 9.2–11.2)
RBC # BLD AUTO: 2.74 M/UL (ref 3.8–5.2)
RBC MORPH BLD: ABNORMAL
SARS-COV-2 RNA RESP QL NAA+PROBE: NOT DETECTED
SERVICE CMNT-IMP: ABNORMAL
SODIUM SERPL-SCNC: 140 MMOL/L (ref 136–145)
SOURCE: NORMAL
TROPONIN I SERPL HS-MCNC: 23 NG/L (ref 0–51)
TROPONIN I SERPL HS-MCNC: 26 NG/L (ref 0–51)
TSH SERPL DL<=0.05 MIU/L-ACNC: 0.12 UIU/ML (ref 0.36–3.74)
WBC # BLD AUTO: 11.5 K/UL (ref 3.6–11)

## 2025-01-17 PROCEDURE — 36415 COLL VENOUS BLD VENIPUNCTURE: CPT

## 2025-01-17 PROCEDURE — 84443 ASSAY THYROID STIM HORMONE: CPT

## 2025-01-17 PROCEDURE — 80053 COMPREHEN METABOLIC PANEL: CPT

## 2025-01-17 PROCEDURE — 84484 ASSAY OF TROPONIN QUANT: CPT

## 2025-01-17 PROCEDURE — 83735 ASSAY OF MAGNESIUM: CPT

## 2025-01-17 PROCEDURE — 87636 SARSCOV2 & INF A&B AMP PRB: CPT

## 2025-01-17 PROCEDURE — 83880 ASSAY OF NATRIURETIC PEPTIDE: CPT

## 2025-01-17 PROCEDURE — 99285 EMERGENCY DEPT VISIT HI MDM: CPT

## 2025-01-17 PROCEDURE — 93005 ELECTROCARDIOGRAM TRACING: CPT | Performed by: STUDENT IN AN ORGANIZED HEALTH CARE EDUCATION/TRAINING PROGRAM

## 2025-01-17 PROCEDURE — 85025 COMPLETE CBC W/AUTO DIFF WBC: CPT

## 2025-01-17 PROCEDURE — 85610 PROTHROMBIN TIME: CPT

## 2025-01-17 PROCEDURE — 71045 X-RAY EXAM CHEST 1 VIEW: CPT

## 2025-01-17 PROCEDURE — 82140 ASSAY OF AMMONIA: CPT

## 2025-01-17 PROCEDURE — 81001 URINALYSIS AUTO W/SCOPE: CPT

## 2025-01-17 PROCEDURE — 82962 GLUCOSE BLOOD TEST: CPT

## 2025-01-17 PROCEDURE — 84439 ASSAY OF FREE THYROXINE: CPT

## 2025-01-17 PROCEDURE — 94762 N-INVAS EAR/PLS OXIMTRY CONT: CPT

## 2025-01-17 PROCEDURE — 6370000000 HC RX 637 (ALT 250 FOR IP): Performed by: HOSPITALIST

## 2025-01-17 PROCEDURE — 1100000000 HC RM PRIVATE

## 2025-01-17 PROCEDURE — 84481 FREE ASSAY (FT-3): CPT

## 2025-01-17 PROCEDURE — 6360000002 HC RX W HCPCS: Performed by: STUDENT IN AN ORGANIZED HEALTH CARE EDUCATION/TRAINING PROGRAM

## 2025-01-17 RX ORDER — RIBOFLAVIN (VITAMIN B2) 100 MG
100 TABLET ORAL DAILY
Status: DISCONTINUED | OUTPATIENT
Start: 2025-01-18 | End: 2025-01-17 | Stop reason: CLARIF

## 2025-01-17 RX ORDER — ONDANSETRON 4 MG/1
4 TABLET, ORALLY DISINTEGRATING ORAL EVERY 8 HOURS PRN
Status: DISCONTINUED | OUTPATIENT
Start: 2025-01-17 | End: 2025-01-24 | Stop reason: HOSPADM

## 2025-01-17 RX ORDER — ASCORBIC ACID 500 MG
250 TABLET ORAL DAILY
Status: DISCONTINUED | OUTPATIENT
Start: 2025-01-18 | End: 2025-01-24 | Stop reason: HOSPADM

## 2025-01-17 RX ORDER — SENNA AND DOCUSATE SODIUM 50; 8.6 MG/1; MG/1
2 TABLET, FILM COATED ORAL DAILY
Status: DISCONTINUED | OUTPATIENT
Start: 2025-01-18 | End: 2025-01-24 | Stop reason: HOSPADM

## 2025-01-17 RX ORDER — ACETAMINOPHEN 650 MG/1
650 SUPPOSITORY RECTAL EVERY 6 HOURS PRN
Status: DISCONTINUED | OUTPATIENT
Start: 2025-01-17 | End: 2025-01-24 | Stop reason: HOSPADM

## 2025-01-17 RX ORDER — SODIUM CHLORIDE 9 MG/ML
INJECTION, SOLUTION INTRAVENOUS PRN
Status: DISCONTINUED | OUTPATIENT
Start: 2025-01-17 | End: 2025-01-24 | Stop reason: HOSPADM

## 2025-01-17 RX ORDER — ACETAMINOPHEN 325 MG/1
650 TABLET ORAL EVERY 6 HOURS PRN
Status: DISCONTINUED | OUTPATIENT
Start: 2025-01-17 | End: 2025-01-24 | Stop reason: HOSPADM

## 2025-01-17 RX ORDER — MULTIVITAMIN WITH IRON
1000 TABLET ORAL DAILY
Status: DISCONTINUED | OUTPATIENT
Start: 2025-01-18 | End: 2025-01-24 | Stop reason: HOSPADM

## 2025-01-17 RX ORDER — POLYETHYLENE GLYCOL 3350 17 G/17G
17 POWDER, FOR SOLUTION ORAL DAILY PRN
Status: DISCONTINUED | OUTPATIENT
Start: 2025-01-17 | End: 2025-01-17

## 2025-01-17 RX ORDER — INSULIN LISPRO 100 [IU]/ML
5 INJECTION, SOLUTION INTRAVENOUS; SUBCUTANEOUS
Status: DISCONTINUED | OUTPATIENT
Start: 2025-01-18 | End: 2025-01-24 | Stop reason: HOSPADM

## 2025-01-17 RX ORDER — I-VITE, TAB 1000-60-2MG (60/BT) 300MCG-200
2 TAB ORAL DAILY
Status: DISCONTINUED | OUTPATIENT
Start: 2025-01-18 | End: 2025-01-24 | Stop reason: HOSPADM

## 2025-01-17 RX ORDER — WARFARIN SODIUM 5 MG/1
5 TABLET ORAL
Status: DISCONTINUED | OUTPATIENT
Start: 2025-01-17 | End: 2025-01-18

## 2025-01-17 RX ORDER — RANOLAZINE 500 MG/1
500 TABLET, EXTENDED RELEASE ORAL 2 TIMES DAILY
Status: DISCONTINUED | OUTPATIENT
Start: 2025-01-17 | End: 2025-01-23

## 2025-01-17 RX ORDER — ONDANSETRON 2 MG/ML
4 INJECTION INTRAMUSCULAR; INTRAVENOUS EVERY 6 HOURS PRN
Status: DISCONTINUED | OUTPATIENT
Start: 2025-01-17 | End: 2025-01-24 | Stop reason: HOSPADM

## 2025-01-17 RX ORDER — POLYETHYLENE GLYCOL 3350 17 G/17G
17 POWDER, FOR SOLUTION ORAL DAILY
Status: DISCONTINUED | OUTPATIENT
Start: 2025-01-18 | End: 2025-01-24 | Stop reason: HOSPADM

## 2025-01-17 RX ORDER — VIT A/VIT C/VIT E/ZINC/COPPER 4296-226
2 CAPSULE ORAL
Status: DISCONTINUED | OUTPATIENT
Start: 2025-01-18 | End: 2025-01-17 | Stop reason: CLARIF

## 2025-01-17 RX ORDER — DILTIAZEM HYDROCHLORIDE 120 MG/1
240 CAPSULE, COATED, EXTENDED RELEASE ORAL DAILY
Status: DISCONTINUED | OUTPATIENT
Start: 2025-01-18 | End: 2025-01-19

## 2025-01-17 RX ORDER — FUROSEMIDE 10 MG/ML
60 INJECTION INTRAMUSCULAR; INTRAVENOUS DAILY
Status: DISCONTINUED | OUTPATIENT
Start: 2025-01-18 | End: 2025-01-19

## 2025-01-17 RX ORDER — TRAZODONE HYDROCHLORIDE 50 MG/1
50 TABLET, FILM COATED ORAL NIGHTLY PRN
Status: DISCONTINUED | OUTPATIENT
Start: 2025-01-17 | End: 2025-01-24 | Stop reason: HOSPADM

## 2025-01-17 RX ORDER — ATORVASTATIN CALCIUM 20 MG/1
20 TABLET, FILM COATED ORAL NIGHTLY
Status: DISCONTINUED | OUTPATIENT
Start: 2025-01-17 | End: 2025-01-22

## 2025-01-17 RX ORDER — FUROSEMIDE 10 MG/ML
60 INJECTION INTRAMUSCULAR; INTRAVENOUS ONCE
Status: COMPLETED | OUTPATIENT
Start: 2025-01-17 | End: 2025-01-17

## 2025-01-17 RX ORDER — SODIUM CHLORIDE 0.9 % (FLUSH) 0.9 %
5-40 SYRINGE (ML) INJECTION PRN
Status: DISCONTINUED | OUTPATIENT
Start: 2025-01-17 | End: 2025-01-24 | Stop reason: HOSPADM

## 2025-01-17 RX ORDER — INSULIN GLARGINE 100 [IU]/ML
14 INJECTION, SOLUTION SUBCUTANEOUS NIGHTLY
Status: DISCONTINUED | OUTPATIENT
Start: 2025-01-17 | End: 2025-01-24 | Stop reason: HOSPADM

## 2025-01-17 RX ORDER — FERROUS SULFATE 325(65) MG
142 TABLET ORAL DAILY
Status: DISCONTINUED | OUTPATIENT
Start: 2025-01-18 | End: 2025-01-24 | Stop reason: HOSPADM

## 2025-01-17 RX ORDER — LEVOTHYROXINE SODIUM 100 UG/1
100 TABLET ORAL
Status: DISCONTINUED | OUTPATIENT
Start: 2025-01-18 | End: 2025-01-24 | Stop reason: HOSPADM

## 2025-01-17 RX ORDER — VITAMIN B COMPLEX
2000 TABLET ORAL DAILY
Status: DISCONTINUED | OUTPATIENT
Start: 2025-01-18 | End: 2025-01-24 | Stop reason: HOSPADM

## 2025-01-17 RX ORDER — METOPROLOL SUCCINATE 50 MG/1
100 TABLET, EXTENDED RELEASE ORAL 2 TIMES DAILY
Status: DISCONTINUED | OUTPATIENT
Start: 2025-01-17 | End: 2025-01-24 | Stop reason: HOSPADM

## 2025-01-17 RX ORDER — SODIUM CHLORIDE 0.9 % (FLUSH) 0.9 %
5-40 SYRINGE (ML) INJECTION EVERY 12 HOURS SCHEDULED
Status: DISCONTINUED | OUTPATIENT
Start: 2025-01-17 | End: 2025-01-24 | Stop reason: HOSPADM

## 2025-01-17 RX ADMIN — Medication 3 MG: at 23:31

## 2025-01-17 RX ADMIN — METOPROLOL SUCCINATE 100 MG: 25 TABLET, EXTENDED RELEASE ORAL at 22:49

## 2025-01-17 RX ADMIN — FUROSEMIDE 60 MG: 10 INJECTION, SOLUTION INTRAMUSCULAR; INTRAVENOUS at 21:14

## 2025-01-17 RX ADMIN — ATORVASTATIN CALCIUM 20 MG: 20 TABLET, FILM COATED ORAL at 22:50

## 2025-01-17 RX ADMIN — INSULIN GLARGINE 14 UNITS: 100 INJECTION, SOLUTION SUBCUTANEOUS at 22:48

## 2025-01-17 RX ADMIN — RANOLAZINE 500 MG: 500 TABLET, FILM COATED, EXTENDED RELEASE ORAL at 23:31

## 2025-01-17 ASSESSMENT — PAIN - FUNCTIONAL ASSESSMENT: PAIN_FUNCTIONAL_ASSESSMENT: 0-10

## 2025-01-17 ASSESSMENT — PAIN DESCRIPTION - LOCATION: LOCATION: GENERALIZED

## 2025-01-17 ASSESSMENT — PAIN SCALES - GENERAL: PAINLEVEL_OUTOF10: 10

## 2025-01-17 NOTE — ED TRIAGE NOTES
Pt arrives via EMS coming from [facility]. Pt nurse at facility told EMS that pt was experiencing increased work of breathing. Pt O2 was low 90s last night and placed on 2L. Facility worried about failure to thrive.    With EMS pt experiencing wheeze, 4L NC went to mid 90s, given duoneb    Denies CP    MD at bedside

## 2025-01-17 NOTE — ED PROVIDER NOTES
Diminished breath sounds in all lung fields.      [facility] EMERGENCY DEPARTMENT  EMERGENCY DEPARTMENT ENCOUNTER      Pt Name: Cuca Padron  MRN: [id number]  Birthdate 1945  Date of evaluation: 1/17/2025  Provider: Krista Kc DO    CHIEF COMPLAINT       Chief Complaint   Patient presents with    Shortness of Breath         HISTORY OF PRESENT ILLNESS    HPI    Cuca Padron is a 79 y.o. female with a history of hyperlipidemia, diabetes, CAD, CKD, atrial flutter who presents to the emergency department for evaluation of shortness of breath.  Patient was recently hospitalized and just discharged to rehabilitation facility on 1/15.  Per facility, patient has been having increasing shortness of breath with new oxygen requirement.  They are concerned for possible fluid overload.  On arrival to the emergency department, patient is complaining of shortness of breath.  Denies chest pain.    Nursing Notes were reviewed.    REVIEW OF SYSTEMS       Review of Systems   Constitutional:  Negative for fever.   Respiratory:  Positive for shortness of breath.    Cardiovascular:  Negative for chest pain.           PAST MEDICAL HISTORY     Past Medical History:   Diagnosis Date    Anemia, chronic disease 7/31/2012    Arrhythmia 2006, 2008    PALPITATIONS,,CARDIAC CATH    Arthritis     OSTEO    CAD (coronary artery disease)     BLOCKAGES BILATERAL CAROTID, SAW MD 1 MONTH AGO    Carotid artery stenosis     Chronic back pain     Chronic kidney disease     Chronic pain     LOWER BACK    Depression     Diabetes (HCC)     TYPE 2, INSULIN    Hearing loss     Hypercholesterolemia     Hypertension     Hypothyroidism 12/29/2011    Macular degeneration 9/18/2015    Neuropathy     Osteoporosis     Pulmonary embolism (HCC)     Pulmonary hypertension, mild (HCC) 2006    Restrictive airway disease     Supraumbilical hernia 12/12/2022    Thromboembolus (HCC) 2009    LEFT KNEE AFTER REPLACEMENT

## 2025-01-18 LAB
AMMONIA PLAS-SCNC: <10 UMOL/L
ANION GAP SERPL CALC-SCNC: 6 MMOL/L (ref 2–12)
APPEARANCE UR: ABNORMAL
BACTERIA URNS QL MICRO: ABNORMAL /HPF
BASOPHILS # BLD: 0.02 K/UL (ref 0–0.1)
BASOPHILS NFR BLD: 0.2 % (ref 0–1)
BILIRUB UR QL: NEGATIVE
BUN SERPL-MCNC: 92 MG/DL (ref 6–20)
BUN/CREAT SERPL: 29 (ref 12–20)
CALCIUM SERPL-MCNC: 9.3 MG/DL (ref 8.5–10.1)
CHLORIDE SERPL-SCNC: 102 MMOL/L (ref 97–108)
CO2 SERPL-SCNC: 26 MMOL/L (ref 21–32)
COLOR UR: ABNORMAL
CREAT SERPL-MCNC: 3.18 MG/DL (ref 0.55–1.02)
DIFFERENTIAL METHOD BLD: ABNORMAL
EOSINOPHIL # BLD: 0.49 K/UL (ref 0–0.4)
EOSINOPHIL NFR BLD: 5.2 % (ref 0–7)
EPITH CASTS URNS QL MICRO: ABNORMAL /LPF
ERYTHROCYTE [DISTWIDTH] IN BLOOD BY AUTOMATED COUNT: 21.3 % (ref 11.5–14.5)
GLUCOSE BLD STRIP.AUTO-MCNC: 154 MG/DL (ref 65–117)
GLUCOSE BLD STRIP.AUTO-MCNC: 164 MG/DL (ref 65–117)
GLUCOSE BLD STRIP.AUTO-MCNC: 290 MG/DL (ref 65–117)
GLUCOSE BLD STRIP.AUTO-MCNC: 89 MG/DL (ref 65–117)
GLUCOSE SERPL-MCNC: 103 MG/DL (ref 65–100)
GLUCOSE UR STRIP.AUTO-MCNC: NEGATIVE MG/DL
HCT VFR BLD AUTO: 23.1 % (ref 35–47)
HGB BLD-MCNC: 7.6 G/DL (ref 11.5–16)
HGB UR QL STRIP: ABNORMAL
IMM GRANULOCYTES # BLD AUTO: 0.1 K/UL (ref 0–0.04)
IMM GRANULOCYTES NFR BLD AUTO: 1.1 % (ref 0–0.5)
INR PPP: 1.8 (ref 0.9–1.1)
KETONES UR QL STRIP.AUTO: NEGATIVE MG/DL
LEUKOCYTE ESTERASE UR QL STRIP.AUTO: ABNORMAL
LYMPHOCYTES # BLD: 0.82 K/UL (ref 0.8–3.5)
LYMPHOCYTES NFR BLD: 8.7 % (ref 12–49)
MCH RBC QN AUTO: 29.5 PG (ref 26–34)
MCHC RBC AUTO-ENTMCNC: 32.9 G/DL (ref 30–36.5)
MCV RBC AUTO: 89.5 FL (ref 80–99)
MONOCYTES # BLD: 0.99 K/UL (ref 0–1)
MONOCYTES NFR BLD: 10.5 % (ref 5–13)
NEUTS SEG # BLD: 6.98 K/UL (ref 1.8–8)
NEUTS SEG NFR BLD: 74.3 % (ref 32–75)
NITRITE UR QL STRIP.AUTO: NEGATIVE
NRBC # BLD: 0.03 K/UL (ref 0–0.01)
NRBC BLD-RTO: 0.3 PER 100 WBC
PH UR STRIP: 6 (ref 5–8)
PLATELET # BLD AUTO: 368 K/UL (ref 150–400)
PMV BLD AUTO: 10.2 FL (ref 8.9–12.9)
POTASSIUM SERPL-SCNC: 4.4 MMOL/L (ref 3.5–5.1)
PROT UR STRIP-MCNC: ABNORMAL MG/DL
PROTHROMBIN TIME: 18.2 SEC (ref 9.2–11.2)
RBC # BLD AUTO: 2.58 M/UL (ref 3.8–5.2)
RBC #/AREA URNS HPF: ABNORMAL /HPF (ref 0–5)
RBC MORPH BLD: ABNORMAL
RBC MORPH BLD: ABNORMAL
SERVICE CMNT-IMP: ABNORMAL
SERVICE CMNT-IMP: NORMAL
SODIUM SERPL-SCNC: 134 MMOL/L (ref 136–145)
SP GR UR REFRACTOMETRY: 1.01 (ref 1–1.03)
T3FREE SERPL-MCNC: 1.3 PG/ML (ref 2.2–4)
T4 FREE SERPL-MCNC: 1.5 NG/DL (ref 0.8–1.5)
URINE CULTURE IF INDICATED: ABNORMAL
UROBILINOGEN UR QL STRIP.AUTO: 0.2 EU/DL (ref 0.2–1)
WBC # BLD AUTO: 9.4 K/UL (ref 3.6–11)
WBC URNS QL MICRO: ABNORMAL /HPF (ref 0–4)

## 2025-01-18 PROCEDURE — 36415 COLL VENOUS BLD VENIPUNCTURE: CPT

## 2025-01-18 PROCEDURE — 94761 N-INVAS EAR/PLS OXIMETRY MLT: CPT

## 2025-01-18 PROCEDURE — 2500000003 HC RX 250 WO HCPCS: Performed by: STUDENT IN AN ORGANIZED HEALTH CARE EDUCATION/TRAINING PROGRAM

## 2025-01-18 PROCEDURE — 2060000000 HC ICU INTERMEDIATE R&B

## 2025-01-18 PROCEDURE — 85610 PROTHROMBIN TIME: CPT

## 2025-01-18 PROCEDURE — 2500000003 HC RX 250 WO HCPCS: Performed by: HOSPITALIST

## 2025-01-18 PROCEDURE — 85025 COMPLETE CBC W/AUTO DIFF WBC: CPT

## 2025-01-18 PROCEDURE — 2700000000 HC OXYGEN THERAPY PER DAY

## 2025-01-18 PROCEDURE — 82962 GLUCOSE BLOOD TEST: CPT

## 2025-01-18 PROCEDURE — 99223 1ST HOSP IP/OBS HIGH 75: CPT | Performed by: HOSPITALIST

## 2025-01-18 PROCEDURE — 80048 BASIC METABOLIC PNL TOTAL CA: CPT

## 2025-01-18 PROCEDURE — 6360000002 HC RX W HCPCS: Performed by: STUDENT IN AN ORGANIZED HEALTH CARE EDUCATION/TRAINING PROGRAM

## 2025-01-18 PROCEDURE — 6370000000 HC RX 637 (ALT 250 FOR IP): Performed by: HOSPITALIST

## 2025-01-18 PROCEDURE — 93005 ELECTROCARDIOGRAM TRACING: CPT | Performed by: HOSPITALIST

## 2025-01-18 PROCEDURE — 81001 URINALYSIS AUTO W/SCOPE: CPT

## 2025-01-18 PROCEDURE — 6360000002 HC RX W HCPCS: Performed by: HOSPITALIST

## 2025-01-18 RX ORDER — WARFARIN SODIUM 5 MG/1
5 TABLET ORAL ONCE
Status: COMPLETED | OUTPATIENT
Start: 2025-01-18 | End: 2025-01-18

## 2025-01-18 RX ORDER — IPRATROPIUM BROMIDE AND ALBUTEROL SULFATE 2.5; .5 MG/3ML; MG/3ML
1 SOLUTION RESPIRATORY (INHALATION)
Status: DISCONTINUED | OUTPATIENT
Start: 2025-01-18 | End: 2025-01-18

## 2025-01-18 RX ORDER — IPRATROPIUM BROMIDE AND ALBUTEROL SULFATE 2.5; .5 MG/3ML; MG/3ML
1 SOLUTION RESPIRATORY (INHALATION) EVERY 4 HOURS PRN
Status: DISCONTINUED | OUTPATIENT
Start: 2025-01-18 | End: 2025-01-24 | Stop reason: HOSPADM

## 2025-01-18 RX ORDER — PETROLATUM,WHITE
OINTMENT IN PACKET (GRAM) TOPICAL PRN
Status: DISCONTINUED | OUTPATIENT
Start: 2025-01-18 | End: 2025-01-24 | Stop reason: HOSPADM

## 2025-01-18 RX ADMIN — INSULIN GLARGINE 14 UNITS: 100 INJECTION, SOLUTION SUBCUTANEOUS at 21:12

## 2025-01-18 RX ADMIN — Medication 2000 UNITS: at 11:41

## 2025-01-18 RX ADMIN — SENNOSIDES AND DOCUSATE SODIUM 2 TABLET: 50; 8.6 TABLET ORAL at 11:41

## 2025-01-18 RX ADMIN — METOPROLOL SUCCINATE 100 MG: 25 TABLET, EXTENDED RELEASE ORAL at 21:12

## 2025-01-18 RX ADMIN — RANOLAZINE 500 MG: 500 TABLET, FILM COATED, EXTENDED RELEASE ORAL at 11:00

## 2025-01-18 RX ADMIN — SODIUM CHLORIDE, PRESERVATIVE FREE 10 ML: 5 INJECTION INTRAVENOUS at 00:22

## 2025-01-18 RX ADMIN — INSULIN LISPRO 5 UNITS: 100 INJECTION, SOLUTION INTRAVENOUS; SUBCUTANEOUS at 11:51

## 2025-01-18 RX ADMIN — WATER 1000 MG: 1 INJECTION INTRAMUSCULAR; INTRAVENOUS; SUBCUTANEOUS at 11:39

## 2025-01-18 RX ADMIN — SODIUM CHLORIDE, PRESERVATIVE FREE 10 ML: 5 INJECTION INTRAVENOUS at 21:12

## 2025-01-18 RX ADMIN — FUROSEMIDE 60 MG: 10 INJECTION, SOLUTION INTRAMUSCULAR; INTRAVENOUS at 11:37

## 2025-01-18 RX ADMIN — WARFARIN SODIUM 5 MG: 5 TABLET ORAL at 05:55

## 2025-01-18 RX ADMIN — LEVOTHYROXINE SODIUM 100 MCG: 0.1 TABLET ORAL at 05:55

## 2025-01-18 RX ADMIN — CYANOCOBALAMIN TAB 500 MCG 1000 MCG: 500 TAB at 11:40

## 2025-01-18 RX ADMIN — ACETAMINOPHEN 650 MG: 325 TABLET ORAL at 21:11

## 2025-01-18 RX ADMIN — POLYETHYLENE GLYCOL 3350 17 G: 17 POWDER, FOR SOLUTION ORAL at 11:38

## 2025-01-18 RX ADMIN — FERROUS SULFATE TAB 325 MG (65 MG ELEMENTAL FE) 162.5 MG: 325 (65 FE) TAB at 11:42

## 2025-01-18 RX ADMIN — SODIUM CHLORIDE, PRESERVATIVE FREE 10 ML: 5 INJECTION INTRAVENOUS at 11:49

## 2025-01-18 RX ADMIN — ATORVASTATIN CALCIUM 20 MG: 20 TABLET, FILM COATED ORAL at 21:12

## 2025-01-18 RX ADMIN — RANOLAZINE 500 MG: 500 TABLET, FILM COATED, EXTENDED RELEASE ORAL at 21:12

## 2025-01-18 RX ADMIN — METOPROLOL SUCCINATE 100 MG: 25 TABLET, EXTENDED RELEASE ORAL at 11:44

## 2025-01-18 RX ADMIN — Medication 3 MG: at 21:11

## 2025-01-18 RX ADMIN — DILTIAZEM HYDROCHLORIDE 240 MG: 120 CAPSULE, COATED, EXTENDED RELEASE ORAL at 11:43

## 2025-01-18 RX ADMIN — OXYCODONE HYDROCHLORIDE AND ACETAMINOPHEN 250 MG: 500 TABLET ORAL at 11:40

## 2025-01-18 ASSESSMENT — PAIN SCALES - GENERAL
PAINLEVEL_OUTOF10: 0
PAINLEVEL_OUTOF10: 3
PAINLEVEL_OUTOF10: 0

## 2025-01-18 ASSESSMENT — PAIN DESCRIPTION - LOCATION: LOCATION: GENERALIZED

## 2025-01-18 NOTE — H&P
thrombus seen on CAMRON 1/2025  --cont coumadin, check INR, pharmacy to dose  --cont diltiazem CD and metoprolol ER  --not candidate for watchman due to thrombus    IDDM with neuropathy and nephropathy  --continue lantus 14 units qhs and 5 units TID WM, low dose SSI    VENITA  --cont home cpap    Hypothyroid  --TSH low 0.12, fT4 pending, add Ft3    CKD 4  --Cr at baseline Cr 2.4-2.7    Anemia of chronic disease  --hgb stable 8.2, denies any bleeding  --on iron and epogen outpatient    Recent ophthalmic herpes  --resolved    CAD, hyperlipidemia  --cont metoprolol, lipitor, ranexa    Generalized weakness  --legs 3/5 b/l, arms 4/5 b/l    Medical Decision Making:   I have personally reviewed the radiographs, laboratory data in Epic and decisions and statements above are based partially on this personal interpretation.    Drug monitoring: coumadin      Code Status: Full Code  Surrogate decision maker daughter Mary Cantor.  Has 2 other daughters Luisa and Padmini  DVT Prophylaxis: Coumadin  GI Prophylaxis: not indicated         Subjective:   CHIEF COMPLAINT: \"ROJO, generalized weakness\"    HISTORY OF PRESENT ILLNESS:     Cuca Padron is a 79 y.o.  female with PMHx coronary artery disease, CKD stage IV, atrial flutter, insulin-dependent diabetes with neuropathy and nephropathy, anemia of chronic disease, obstructive sleep apnea on CPAP who was recently admitted to [facility] with a flutter with RVR, ophthalmic herpes, found to have EF of 60% with diastolic dysfunction (severity could not be classified, severe mitral stenosis presented from [facility] for shortness of breath and hypoxia.  She was noted to be 90% on room air and was started on 2 L O2 yesterday at [facility].  She was also felt to be confused and gabapentin was discontinued yesterday at [facility].  She has been having severe dyspnea with exertion.  Denies any chest pain.    Chest x-ray shows pulmonary edema and she is currently

## 2025-01-18 NOTE — CARE COORDINATION
Care Management Initial Assessment  1/18/2025 4:03 PM  If patient is discharged prior to next notation, then this note serves as note for discharge by case management.    Reason for Admission:   Acute pulmonary edema [J81.0]  Acute on chronic diastolic CHF (congestive heart failure) (HCC) [I50.33]  Dyspnea, unspecified type [R06.00]         Patient Admission Status: Inpatient  Date Admitted to INP: 1/17/2025 (can return to [facility] within 3 midnights as he is Medicare primary.  Physician made aware today.   RUR: Readmission Risk Score: 27.2    Hospitalization in the last 30 days (Readmission):  Yes        Advance Care Planning:  Code Status: Full Code  Primary Healthcare Decision Maker: (P) Legal Next of Kin (daughter Mary Cantor [phone])   Advance Directive: has NO advanced directive - not interested in additional information     __________________________________________________________________________  Assessment:      01/18/25 7946   Service Assessment   Patient Orientation Person;Other (see comment)  (increased confusion on admission, which is new)   Cognition Other (see comment)  (new episode of confusion)   History Provided By Medical Record   Primary Caregiver Self  (prior to last admission and transfer to inpatient rehab at [facility])   Support Systems Children   Patient's Healthcare Decision Maker is: Legal Next of Kin  (edilia Cantor [phone])   PCP Verified by CM Yes   Last Visit to PCP Within last 3 months   Can patient return to prior living arrangement Other (see comment)  (goal is to return to prior living arrangement after inpatient rehab stay)   Family able to assist with home care needs: Yes   Financial Resources Medicare  (Medicare primary, is here on DEBBIE from inpatient rehab.  third midnight under Medicare rule will be 1/19.)   CM/SW Referral Other (see comment)  (had been going to outpatient PT at [facility] prior to need for rehab)   Social/Functional History   Lives With

## 2025-01-18 NOTE — ED NOTES
This RN attempted to call [facility] twice regarding med rec, with no answer. Voicemail left.

## 2025-01-18 NOTE — ED NOTES
TRANSFER - OUT REPORT:    Verbal report given to Senthil HAN on Cuca Padron  being transferred to [facility] for routine progression of patient care       Report consisted of patient's Situation, Background, Assessment and   Recommendations(SBAR).     Information from the following report(s) ED SBAR was reviewed with the receiving nurse.    [place] Fall Assessment:    Presents to emergency department  because of falls (Syncope, seizure, or loss of consciousness): No  Age > 70: No  Altered Mental Status, Intoxication with alcohol or substance confusion (Disorientation, impaired judgment, poor safety awaremess, or inability to follow instructions): No  Impaired Mobility: Ambulates or transfers with assistive devices or assistance; Unable to ambulate or transer.: No             Lines:   Peripheral IV 01/17/25 Left Antecubital (Active)   Site Assessment Clean, dry & intact 01/18/25 0100   Line Status Flushed;Normal saline locked;Capped 01/18/25 0100   Line Care Cap changed;Connections checked and tightened;Chlorhexidine wipes;Ports disinfected 01/18/25 0100   Phlebitis Assessment No symptoms 01/18/25 0100   Infiltration Assessment 0 01/18/25 0100   Alcohol Cap Used Yes 01/18/25 0100   Dressing Status Clean, dry & intact 01/18/25 0100   Dressing Type Transparent 01/18/25 0100        Opportunity for questions and clarification was provided.      Patient transported with:  Monitor and Tech

## 2025-01-19 ENCOUNTER — APPOINTMENT (OUTPATIENT)
Facility: HOSPITAL | Age: 80
DRG: 291 | End: 2025-01-19
Attending: HOSPITALIST
Payer: MEDICARE

## 2025-01-19 ENCOUNTER — APPOINTMENT (OUTPATIENT)
Facility: HOSPITAL | Age: 80
DRG: 291 | End: 2025-01-19
Payer: MEDICARE

## 2025-01-19 LAB
ALBUMIN SERPL-MCNC: 2.2 G/DL (ref 3.5–5)
ANION GAP SERPL CALC-SCNC: 7 MMOL/L (ref 2–12)
ANION GAP SERPL CALC-SCNC: 9 MMOL/L (ref 2–12)
BUN SERPL-MCNC: 86 MG/DL (ref 6–20)
BUN SERPL-MCNC: 88 MG/DL (ref 6–20)
BUN/CREAT SERPL: 30 (ref 12–20)
BUN/CREAT SERPL: 31 (ref 12–20)
CALCIUM SERPL-MCNC: 9 MG/DL (ref 8.5–10.1)
CALCIUM SERPL-MCNC: 9.4 MG/DL (ref 8.5–10.1)
CHLORIDE SERPL-SCNC: 100 MMOL/L (ref 97–108)
CHLORIDE SERPL-SCNC: 101 MMOL/L (ref 97–108)
CO2 SERPL-SCNC: 27 MMOL/L (ref 21–32)
CO2 SERPL-SCNC: 27 MMOL/L (ref 21–32)
CREAT SERPL-MCNC: 2.83 MG/DL (ref 0.55–1.02)
CREAT SERPL-MCNC: 2.88 MG/DL (ref 0.55–1.02)
ECHO AO ARCH DIAM: 2 CM
ECHO AO ASC DIAM: 3.2 CM
ECHO AO ASCENDING AORTA INDEX: 1.57 CM/M2
ECHO AO ROOT DIAM: 2.9 CM
ECHO AO ROOT INDEX: 1.42 CM/M2
ECHO AV AREA PEAK VELOCITY: 1 CM2
ECHO AV AREA VTI: 1.1 CM2
ECHO AV AREA/BSA PEAK VELOCITY: 0.5 CM2/M2
ECHO AV AREA/BSA VTI: 0.5 CM2/M2
ECHO AV MEAN GRADIENT: 15 MMHG
ECHO AV MEAN VELOCITY: 1.8 M/S
ECHO AV PEAK GRADIENT: 27 MMHG
ECHO AV PEAK VELOCITY: 2.6 M/S
ECHO AV VELOCITY RATIO: 0.46
ECHO AV VTI: 62.4 CM
ECHO BSA: 2.11 M2
ECHO EST RA PRESSURE: 8 MMHG
ECHO LA DIAMETER INDEX: 2.16 CM/M2
ECHO LA DIAMETER: 4.4 CM
ECHO LA TO AORTIC ROOT RATIO: 1.52
ECHO LA VOL A-L A2C: 75 ML (ref 22–52)
ECHO LA VOL A-L A4C: 60 ML (ref 22–52)
ECHO LA VOL BP: 62 ML (ref 22–52)
ECHO LA VOL MOD A2C: 65 ML (ref 22–52)
ECHO LA VOL MOD A4C: 54 ML (ref 22–52)
ECHO LA VOL/BSA BIPLANE: 30 ML/M2 (ref 16–34)
ECHO LA VOLUME AREA LENGTH: 70 ML
ECHO LA VOLUME INDEX A-L A2C: 37 ML/M2 (ref 16–34)
ECHO LA VOLUME INDEX A-L A4C: 29 ML/M2 (ref 16–34)
ECHO LA VOLUME INDEX AREA LENGTH: 34 ML/M2 (ref 16–34)
ECHO LA VOLUME INDEX MOD A2C: 32 ML/M2 (ref 16–34)
ECHO LA VOLUME INDEX MOD A4C: 26 ML/M2 (ref 16–34)
ECHO LV E' LATERAL VELOCITY: 6.78 CM/S
ECHO LV E' SEPTAL VELOCITY: 4.3 CM/S
ECHO LV EDV A2C: 84 ML
ECHO LV EDV A4C: 90 ML
ECHO LV EDV BP: 86 ML (ref 56–104)
ECHO LV EDV INDEX A4C: 44 ML/M2
ECHO LV EDV INDEX BP: 42 ML/M2
ECHO LV EDV NDEX A2C: 41 ML/M2
ECHO LV EF PHYSICIAN: 70 %
ECHO LV EJECTION FRACTION A2C: 83 %
ECHO LV EJECTION FRACTION A4C: 81 %
ECHO LV ESV A2C: 14 ML
ECHO LV ESV A4C: 17 ML
ECHO LV ESV BP: 16 ML (ref 19–49)
ECHO LV ESV INDEX A2C: 7 ML/M2
ECHO LV ESV INDEX A4C: 8 ML/M2
ECHO LV ESV INDEX BP: 8 ML/M2
ECHO LV FRACTIONAL SHORTENING: 31 % (ref 28–44)
ECHO LV INTERNAL DIMENSION DIASTOLE INDEX: 2.65 CM/M2
ECHO LV INTERNAL DIMENSION DIASTOLIC: 5.4 CM (ref 3.9–5.3)
ECHO LV INTERNAL DIMENSION SYSTOLIC INDEX: 1.81 CM/M2
ECHO LV INTERNAL DIMENSION SYSTOLIC: 3.7 CM
ECHO LV IVSD: 0.8 CM (ref 0.6–0.9)
ECHO LV MASS 2D: 155 G (ref 67–162)
ECHO LV MASS INDEX 2D: 76 G/M2 (ref 43–95)
ECHO LV POSTERIOR WALL DIASTOLIC: 0.8 CM (ref 0.6–0.9)
ECHO LV RELATIVE WALL THICKNESS RATIO: 0.3
ECHO LVOT AREA: 2.3 CM2
ECHO LVOT AV VTI INDEX: 0.48
ECHO LVOT DIAM: 1.7 CM
ECHO LVOT MEAN GRADIENT: 3 MMHG
ECHO LVOT PEAK GRADIENT: 6 MMHG
ECHO LVOT PEAK VELOCITY: 1.2 M/S
ECHO LVOT STROKE VOLUME INDEX: 33 ML/M2
ECHO LVOT SV: 67.4 ML
ECHO LVOT VTI: 29.7 CM
ECHO MV A VELOCITY: 0.92 M/S
ECHO MV AREA PHT: 2.1 CM2
ECHO MV AREA VTI: 1.2 CM2
ECHO MV E DECELERATION TIME (DT): 285.8 MS
ECHO MV E VELOCITY: 1.78 M/S
ECHO MV E/A RATIO: 1.93
ECHO MV E/E' LATERAL: 26.25
ECHO MV E/E' RATIO (AVERAGED): 33.82
ECHO MV E/E' SEPTAL: 41.4
ECHO MV EROA PISA: 0.1 CM2
ECHO MV LVOT VTI INDEX: 1.96
ECHO MV MAX VELOCITY: 1.9 M/S
ECHO MV MEAN GRADIENT: 4 MMHG
ECHO MV MEAN VELOCITY: 0.8 M/S
ECHO MV PEAK GRADIENT: 14 MMHG
ECHO MV PRESSURE HALF TIME (PHT): 103.7 MS
ECHO MV REGURGITANT ALIASING (NYQUIST) VELOCITY: 37 CM/S
ECHO MV REGURGITANT PEAK VELOCITY: 5.9 M/S
ECHO MV REGURGITANT PEAK VELOCITY: 6.7 M/S
ECHO MV REGURGITANT RADIUS PISA: 0.52 CM
ECHO MV REGURGITANT VELOCITY PISA: 6.5 M/S
ECHO MV REGURGITANT VTIA: 195.6 CM
ECHO MV REGURGITANT VTIA: 197.9 CM
ECHO MV VTI: 58.2 CM
ECHO PULMONARY ARTERY END DIASTOLIC PRESSURE: 1 MMHG
ECHO PV MAX VELOCITY: 1.2 M/S
ECHO PV PEAK GRADIENT: 6 MMHG
ECHO PV REGURGITANT MAX VELOCITY: 0.4 M/S
ECHO RIGHT VENTRICULAR SYSTOLIC PRESSURE (RVSP): 58 MMHG
ECHO RV FREE WALL PEAK S': 13.5 CM/S
ECHO RV INTERNAL DIMENSION: 3.7 CM
ECHO RV TAPSE: 2.1 CM (ref 1.7–?)
ECHO TV REGURGITANT MAX VELOCITY: 3.54 M/S
ECHO TV REGURGITANT PEAK GRADIENT: 50 MMHG
EKG ATRIAL RATE: 65 BPM
EKG ATRIAL RATE: 67 BPM
EKG DIAGNOSIS: NORMAL
EKG DIAGNOSIS: NORMAL
EKG P AXIS: 108 DEGREES
EKG P AXIS: 76 DEGREES
EKG P-R INTERVAL: 176 MS
EKG P-R INTERVAL: 192 MS
EKG Q-T INTERVAL: 404 MS
EKG Q-T INTERVAL: 404 MS
EKG QRS DURATION: 90 MS
EKG QRS DURATION: 94 MS
EKG QTC CALCULATION (BAZETT): 420 MS
EKG QTC CALCULATION (BAZETT): 426 MS
EKG R AXIS: -38 DEGREES
EKG R AXIS: -41 DEGREES
EKG T AXIS: 26 DEGREES
EKG T AXIS: 58 DEGREES
EKG VENTRICULAR RATE: 65 BPM
EKG VENTRICULAR RATE: 67 BPM
GLUCOSE BLD STRIP.AUTO-MCNC: 155 MG/DL (ref 65–117)
GLUCOSE BLD STRIP.AUTO-MCNC: 170 MG/DL (ref 65–117)
GLUCOSE BLD STRIP.AUTO-MCNC: 187 MG/DL (ref 65–117)
GLUCOSE BLD STRIP.AUTO-MCNC: 223 MG/DL (ref 65–117)
GLUCOSE SERPL-MCNC: 130 MG/DL (ref 65–100)
GLUCOSE SERPL-MCNC: 152 MG/DL (ref 65–100)
INR PPP: 1.9 (ref 0.9–1.1)
NT PRO BNP: 3709 PG/ML
PHOSPHATE SERPL-MCNC: 4 MG/DL (ref 2.6–4.7)
POTASSIUM SERPL-SCNC: 4.1 MMOL/L (ref 3.5–5.1)
POTASSIUM SERPL-SCNC: 4.3 MMOL/L (ref 3.5–5.1)
PROTHROMBIN TIME: 19 SEC (ref 9.2–11.2)
SERVICE CMNT-IMP: ABNORMAL
SODIUM SERPL-SCNC: 135 MMOL/L (ref 136–145)
SODIUM SERPL-SCNC: 136 MMOL/L (ref 136–145)
TROPONIN I SERPL HS-MCNC: 26 NG/L (ref 0–51)

## 2025-01-19 PROCEDURE — 93306 TTE W/DOPPLER COMPLETE: CPT

## 2025-01-19 PROCEDURE — 80048 BASIC METABOLIC PNL TOTAL CA: CPT

## 2025-01-19 PROCEDURE — 93010 ELECTROCARDIOGRAM REPORT: CPT | Performed by: HOSPITALIST

## 2025-01-19 PROCEDURE — 83880 ASSAY OF NATRIURETIC PEPTIDE: CPT

## 2025-01-19 PROCEDURE — 80069 RENAL FUNCTION PANEL: CPT

## 2025-01-19 PROCEDURE — 2500000003 HC RX 250 WO HCPCS: Performed by: HOSPITALIST

## 2025-01-19 PROCEDURE — 36415 COLL VENOUS BLD VENIPUNCTURE: CPT

## 2025-01-19 PROCEDURE — P9047 ALBUMIN (HUMAN), 25%, 50ML: HCPCS | Performed by: STUDENT IN AN ORGANIZED HEALTH CARE EDUCATION/TRAINING PROGRAM

## 2025-01-19 PROCEDURE — 97163 PT EVAL HIGH COMPLEX 45 MIN: CPT

## 2025-01-19 PROCEDURE — 93005 ELECTROCARDIOGRAM TRACING: CPT | Performed by: STUDENT IN AN ORGANIZED HEALTH CARE EDUCATION/TRAINING PROGRAM

## 2025-01-19 PROCEDURE — 93010 ELECTROCARDIOGRAM REPORT: CPT | Performed by: INTERNAL MEDICINE

## 2025-01-19 PROCEDURE — 97530 THERAPEUTIC ACTIVITIES: CPT

## 2025-01-19 PROCEDURE — 93306 TTE W/DOPPLER COMPLETE: CPT | Performed by: HOSPITALIST

## 2025-01-19 PROCEDURE — 82962 GLUCOSE BLOOD TEST: CPT

## 2025-01-19 PROCEDURE — 76770 US EXAM ABDO BACK WALL COMP: CPT

## 2025-01-19 PROCEDURE — 6370000000 HC RX 637 (ALT 250 FOR IP): Performed by: HOSPITALIST

## 2025-01-19 PROCEDURE — 99233 SBSQ HOSP IP/OBS HIGH 50: CPT | Performed by: HOSPITALIST

## 2025-01-19 PROCEDURE — 94761 N-INVAS EAR/PLS OXIMETRY MLT: CPT

## 2025-01-19 PROCEDURE — 2700000000 HC OXYGEN THERAPY PER DAY

## 2025-01-19 PROCEDURE — 84484 ASSAY OF TROPONIN QUANT: CPT

## 2025-01-19 PROCEDURE — 6370000000 HC RX 637 (ALT 250 FOR IP): Performed by: STUDENT IN AN ORGANIZED HEALTH CARE EDUCATION/TRAINING PROGRAM

## 2025-01-19 PROCEDURE — 2060000000 HC ICU INTERMEDIATE R&B

## 2025-01-19 PROCEDURE — 71045 X-RAY EXAM CHEST 1 VIEW: CPT

## 2025-01-19 PROCEDURE — 6360000002 HC RX W HCPCS: Performed by: STUDENT IN AN ORGANIZED HEALTH CARE EDUCATION/TRAINING PROGRAM

## 2025-01-19 PROCEDURE — 85610 PROTHROMBIN TIME: CPT

## 2025-01-19 RX ORDER — WARFARIN SODIUM 1 MG/1
4 TABLET ORAL
Status: COMPLETED | OUTPATIENT
Start: 2025-01-19 | End: 2025-01-19

## 2025-01-19 RX ORDER — ALBUMIN (HUMAN) 12.5 G/50ML
25 SOLUTION INTRAVENOUS EVERY 6 HOURS
Status: COMPLETED | OUTPATIENT
Start: 2025-01-19 | End: 2025-01-19

## 2025-01-19 RX ORDER — FUROSEMIDE 10 MG/ML
40 INJECTION INTRAMUSCULAR; INTRAVENOUS 2 TIMES DAILY
Status: DISCONTINUED | OUTPATIENT
Start: 2025-01-19 | End: 2025-01-20

## 2025-01-19 RX ORDER — FUROSEMIDE 10 MG/ML
20 INJECTION INTRAMUSCULAR; INTRAVENOUS ONCE
Status: COMPLETED | OUTPATIENT
Start: 2025-01-20 | End: 2025-01-20

## 2025-01-19 RX ORDER — TRAMADOL HYDROCHLORIDE 50 MG/1
50 TABLET ORAL EVERY 6 HOURS PRN
Status: DISCONTINUED | OUTPATIENT
Start: 2025-01-19 | End: 2025-01-24 | Stop reason: HOSPADM

## 2025-01-19 RX ORDER — AMIODARONE HYDROCHLORIDE 200 MG/1
400 TABLET ORAL 2 TIMES DAILY
Status: DISCONTINUED | OUTPATIENT
Start: 2025-01-19 | End: 2025-01-24 | Stop reason: HOSPADM

## 2025-01-19 RX ORDER — ALBUMIN (HUMAN) 12.5 G/50ML
25 SOLUTION INTRAVENOUS EVERY 6 HOURS
Status: DISCONTINUED | OUTPATIENT
Start: 2025-01-19 | End: 2025-01-19

## 2025-01-19 RX ADMIN — AMIODARONE HYDROCHLORIDE 400 MG: 200 TABLET ORAL at 09:25

## 2025-01-19 RX ADMIN — FUROSEMIDE 40 MG: 10 INJECTION, SOLUTION INTRAMUSCULAR; INTRAVENOUS at 09:26

## 2025-01-19 RX ADMIN — RANOLAZINE 500 MG: 500 TABLET, FILM COATED, EXTENDED RELEASE ORAL at 20:47

## 2025-01-19 RX ADMIN — METOPROLOL SUCCINATE 100 MG: 25 TABLET, EXTENDED RELEASE ORAL at 20:48

## 2025-01-19 RX ADMIN — POLYETHYLENE GLYCOL 3350 17 G: 17 POWDER, FOR SOLUTION ORAL at 09:22

## 2025-01-19 RX ADMIN — SODIUM CHLORIDE, PRESERVATIVE FREE 10 ML: 5 INJECTION INTRAVENOUS at 20:48

## 2025-01-19 RX ADMIN — INSULIN LISPRO 5 UNITS: 100 INJECTION, SOLUTION INTRAVENOUS; SUBCUTANEOUS at 17:40

## 2025-01-19 RX ADMIN — ACETAMINOPHEN 650 MG: 325 TABLET ORAL at 09:23

## 2025-01-19 RX ADMIN — SENNOSIDES AND DOCUSATE SODIUM 2 TABLET: 50; 8.6 TABLET ORAL at 09:22

## 2025-01-19 RX ADMIN — AMIODARONE HYDROCHLORIDE 400 MG: 200 TABLET ORAL at 20:47

## 2025-01-19 RX ADMIN — TRAZODONE HYDROCHLORIDE 50 MG: 50 TABLET ORAL at 02:11

## 2025-01-19 RX ADMIN — LEVOTHYROXINE SODIUM 100 MCG: 0.1 TABLET ORAL at 06:47

## 2025-01-19 RX ADMIN — INSULIN GLARGINE 14 UNITS: 100 INJECTION, SOLUTION SUBCUTANEOUS at 20:47

## 2025-01-19 RX ADMIN — INSULIN LISPRO 5 UNITS: 100 INJECTION, SOLUTION INTRAVENOUS; SUBCUTANEOUS at 13:11

## 2025-01-19 RX ADMIN — Medication 2000 UNITS: at 10:13

## 2025-01-19 RX ADMIN — SODIUM CHLORIDE, PRESERVATIVE FREE 10 ML: 5 INJECTION INTRAVENOUS at 09:26

## 2025-01-19 RX ADMIN — Medication 2 TABLET: at 09:29

## 2025-01-19 RX ADMIN — FERROUS SULFATE TAB 325 MG (65 MG ELEMENTAL FE) 162.5 MG: 325 (65 FE) TAB at 09:24

## 2025-01-19 RX ADMIN — ATORVASTATIN CALCIUM 20 MG: 20 TABLET, FILM COATED ORAL at 20:48

## 2025-01-19 RX ADMIN — METOPROLOL SUCCINATE 100 MG: 25 TABLET, EXTENDED RELEASE ORAL at 09:23

## 2025-01-19 RX ADMIN — CYANOCOBALAMIN TAB 500 MCG 1000 MCG: 500 TAB at 09:23

## 2025-01-19 RX ADMIN — ALBUMIN (HUMAN) 25 G: 0.25 INJECTION, SOLUTION INTRAVENOUS at 17:51

## 2025-01-19 RX ADMIN — WARFARIN SODIUM 4 MG: 1 TABLET ORAL at 17:39

## 2025-01-19 RX ADMIN — Medication 3 MG: at 20:48

## 2025-01-19 RX ADMIN — RANOLAZINE 500 MG: 500 TABLET, FILM COATED, EXTENDED RELEASE ORAL at 09:23

## 2025-01-19 RX ADMIN — OXYCODONE HYDROCHLORIDE AND ACETAMINOPHEN 250 MG: 500 TABLET ORAL at 09:25

## 2025-01-19 RX ADMIN — INSULIN LISPRO 5 UNITS: 100 INJECTION, SOLUTION INTRAVENOUS; SUBCUTANEOUS at 09:22

## 2025-01-19 RX ADMIN — FUROSEMIDE 40 MG: 10 INJECTION, SOLUTION INTRAMUSCULAR; INTRAVENOUS at 17:41

## 2025-01-19 ASSESSMENT — PAIN SCALES - GENERAL
PAINLEVEL_OUTOF10: 9
PAINLEVEL_OUTOF10: 8

## 2025-01-19 ASSESSMENT — PAIN DESCRIPTION - DESCRIPTORS: DESCRIPTORS: ACHING

## 2025-01-19 ASSESSMENT — PAIN DESCRIPTION - LOCATION: LOCATION: LEG

## 2025-01-19 ASSESSMENT — PAIN DESCRIPTION - ORIENTATION: ORIENTATION: RIGHT;LEFT

## 2025-01-19 NOTE — CONSULTS
LVIDs   Date Value Ref Range Status   01/08/2025 2.3 cm Final     LV E' Septal Velocity   Date Value Ref Range Status   01/08/2025 3.84 cm/s Final     LV E' Lateral Velocity   Date Value Ref Range Status   01/08/2025 3.55 cm/s Final         Review of Systems      [x] 12 point review of systems performed and is all negative except as written in HPI/above  [] Patient unable to provide secondary to condition       Social Hx:  reports that she quit smoking about 30 years ago. Her smoking use included cigarettes. She has never used smokeless tobacco. She reports that she does not currently use alcohol. She reports that she does not use drugs.    Past Medical History:   Diagnosis Date    Anemia, chronic disease 7/31/2012    Arrhythmia 2006, 2008    PALPITATIONS,,CARDIAC CATH    Arthritis     OSTEO    CAD (coronary artery disease)     BLOCKAGES BILATERAL CAROTID, SAW MD 1 MONTH AGO    Carotid artery stenosis     Chronic back pain     Chronic kidney disease     Chronic pain     LOWER BACK    Depression     Diabetes (HCC)     TYPE 2, INSULIN    Hearing loss     Hypercholesterolemia     Hypertension     Hypothyroidism 12/29/2011    Macular degeneration 9/18/2015    Neuropathy     Osteoporosis     Pulmonary embolism (HCC)     Pulmonary hypertension, mild (HCC) 2006    Restrictive airway disease     Supraumbilical hernia 12/12/2022    Thromboembolus (HCC) 2009    LEFT KNEE AFTER REPLACEMENT    Thyroid disease     HYPOTHYROID    Unspecified sleep apnea     USES CPAP     Past Surgical History:   Procedure Laterality Date    ANESTH,SURGERY OF SHOULDER      CARDIAC CATHETERIZATION  2006, 2008    2 TIMES    COLONOSCOPY      2003;neg    COLONOSCOPY N/A 12/22/2023    COLONOSCOPY DIAGNOSTIC performed by Fay Flores MD at [facility] ENDOSCOPY    EYE SURGERY      JOINT REPLACEMENT      TONSILLECTOMY  1951    TOTAL KNEE ARTHROPLASTY  2009    LEFT    UPPER GASTROINTESTINAL ENDOSCOPY N/A 12/22/2023    EGD ESOPHAGOGASTRODUODENOSCOPY, 
function (High)    B) Amount/ Complexity of Data reviewed (1 of 3 categories = Moderate, 2 of 3 categories = High)    Test, documents, or independent historian(s) (any THREE ELEMENTS):   []  Review of external note(s) from unique source:  [x]  Review of the result(s) of each unique test  (3+)   [x]  Ordered a unique test EEG  [x]  Discussed with a historian other than the patient: Daughter    Independent interpretation of a test performed by another Physician / QHP:     [] Yes      Discussion of management or test interpretation with another Physician / QHP:     [x] Yes  Discussed with Dr Marino via [employer] (secure messaging)     C) Risk of Complication, Morbidity, or Mortality:     Escalation of hospital level of care (High Risk):  [] Yes [] No  Decision to de-escalate care or DNR due to poor prognosis (High Risk):     [] Yes   [] No  Parenteral controlled substances prescribed (High Risk):     [] Yes [] No  Drug Therapy requiring intensive monitoring for toxicity (High Risk)  [] Yes   [] No  Prescription drug management (Moderate Risk):       [] Yes     [] No  Decision regarding minor surgery (Moderate Risk):      [] Yes      [] No        
[id number]  Phone: [phone]     [phone]   [facility] Office  [address]  [address]  [place] [address]  Phone: [phone]     [phone]   [facility] Office  [address], [address]  [place] [address]  Phone: [phone]     [phone]   [place] Office  [address]   [place] [address]  Phone: [phone]     [phone]

## 2025-01-20 ENCOUNTER — HOSPITAL ENCOUNTER (INPATIENT)
Facility: HOSPITAL | Age: 80
Discharge: HOME | DRG: 291 | End: 2025-01-23
Payer: MEDICARE

## 2025-01-20 PROBLEM — R06.02 SHORTNESS OF BREATH: Status: ACTIVE | Noted: 2025-01-20

## 2025-01-20 PROBLEM — I50.31 ACUTE DIASTOLIC CONGESTIVE HEART FAILURE (HCC): Status: ACTIVE | Noted: 2025-01-20

## 2025-01-20 PROBLEM — I34.2 NONRHEUMATIC MITRAL VALVE STENOSIS: Status: ACTIVE | Noted: 2025-01-20

## 2025-01-20 PROBLEM — G25.3 MYOCLONUS: Status: ACTIVE | Noted: 2025-01-20

## 2025-01-20 LAB
ANION GAP SERPL CALC-SCNC: 5 MMOL/L (ref 2–12)
BASOPHILS # BLD: 0.03 K/UL (ref 0–0.1)
BASOPHILS NFR BLD: 0.3 % (ref 0–1)
BUN SERPL-MCNC: 88 MG/DL (ref 6–20)
BUN/CREAT SERPL: 33 (ref 12–20)
CALCIUM SERPL-MCNC: 9 MG/DL (ref 8.5–10.1)
CHLORIDE SERPL-SCNC: 103 MMOL/L (ref 97–108)
CO2 SERPL-SCNC: 29 MMOL/L (ref 21–32)
CREAT SERPL-MCNC: 2.7 MG/DL (ref 0.55–1.02)
DIFFERENTIAL METHOD BLD: ABNORMAL
EKG ATRIAL RATE: 66 BPM
EKG DIAGNOSIS: NORMAL
EKG P AXIS: 71 DEGREES
EKG P-R INTERVAL: 186 MS
EKG Q-T INTERVAL: 432 MS
EKG QRS DURATION: 90 MS
EKG QTC CALCULATION (BAZETT): 452 MS
EKG R AXIS: -47 DEGREES
EKG T AXIS: 39 DEGREES
EKG VENTRICULAR RATE: 66 BPM
EOSINOPHIL # BLD: 0.68 K/UL (ref 0–0.4)
EOSINOPHIL NFR BLD: 6.7 % (ref 0–7)
ERYTHROCYTE [DISTWIDTH] IN BLOOD BY AUTOMATED COUNT: 21.2 % (ref 11.5–14.5)
GLUCOSE BLD STRIP.AUTO-MCNC: 118 MG/DL (ref 65–117)
GLUCOSE BLD STRIP.AUTO-MCNC: 140 MG/DL (ref 65–117)
GLUCOSE BLD STRIP.AUTO-MCNC: 158 MG/DL (ref 65–117)
GLUCOSE BLD STRIP.AUTO-MCNC: 206 MG/DL (ref 65–117)
GLUCOSE SERPL-MCNC: 137 MG/DL (ref 65–100)
HCT VFR BLD AUTO: 21.9 % (ref 35–47)
HGB BLD-MCNC: 7.2 G/DL (ref 11.5–16)
IMM GRANULOCYTES # BLD AUTO: 0.08 K/UL (ref 0–0.04)
IMM GRANULOCYTES NFR BLD AUTO: 0.8 % (ref 0–0.5)
INR PPP: 1.8 (ref 0.9–1.1)
LYMPHOCYTES # BLD: 0.65 K/UL (ref 0.8–3.5)
LYMPHOCYTES NFR BLD: 6.4 % (ref 12–49)
MCH RBC QN AUTO: 29.6 PG (ref 26–34)
MCHC RBC AUTO-ENTMCNC: 32.9 G/DL (ref 30–36.5)
MCV RBC AUTO: 90.1 FL (ref 80–99)
MONOCYTES # BLD: 0.84 K/UL (ref 0–1)
MONOCYTES NFR BLD: 8.3 % (ref 5–13)
NEUTS SEG # BLD: 7.83 K/UL (ref 1.8–8)
NEUTS SEG NFR BLD: 77.5 % (ref 32–75)
NRBC # BLD: 0 K/UL (ref 0–0.01)
NRBC BLD-RTO: 0 PER 100 WBC
PLATELET # BLD AUTO: 387 K/UL (ref 150–400)
PMV BLD AUTO: 10.1 FL (ref 8.9–12.9)
POTASSIUM SERPL-SCNC: 4.3 MMOL/L (ref 3.5–5.1)
PROTHROMBIN TIME: 18.6 SEC (ref 9.2–11.2)
RBC # BLD AUTO: 2.43 M/UL (ref 3.8–5.2)
RBC MORPH BLD: ABNORMAL
SERVICE CMNT-IMP: ABNORMAL
SODIUM SERPL-SCNC: 137 MMOL/L (ref 136–145)
WBC # BLD AUTO: 10.1 K/UL (ref 3.6–11)

## 2025-01-20 PROCEDURE — 6370000000 HC RX 637 (ALT 250 FOR IP): Performed by: STUDENT IN AN ORGANIZED HEALTH CARE EDUCATION/TRAINING PROGRAM

## 2025-01-20 PROCEDURE — 85025 COMPLETE CBC W/AUTO DIFF WBC: CPT

## 2025-01-20 PROCEDURE — 95816 EEG AWAKE AND DROWSY: CPT

## 2025-01-20 PROCEDURE — 6360000002 HC RX W HCPCS: Performed by: INTERNAL MEDICINE

## 2025-01-20 PROCEDURE — 70551 MRI BRAIN STEM W/O DYE: CPT

## 2025-01-20 PROCEDURE — 80048 BASIC METABOLIC PNL TOTAL CA: CPT

## 2025-01-20 PROCEDURE — 6360000002 HC RX W HCPCS: Performed by: STUDENT IN AN ORGANIZED HEALTH CARE EDUCATION/TRAINING PROGRAM

## 2025-01-20 PROCEDURE — 6370000000 HC RX 637 (ALT 250 FOR IP): Performed by: HOSPITALIST

## 2025-01-20 PROCEDURE — 6360000002 HC RX W HCPCS

## 2025-01-20 PROCEDURE — 95816 EEG AWAKE AND DROWSY: CPT | Performed by: PSYCHIATRY & NEUROLOGY

## 2025-01-20 PROCEDURE — 97530 THERAPEUTIC ACTIVITIES: CPT

## 2025-01-20 PROCEDURE — 2700000000 HC OXYGEN THERAPY PER DAY

## 2025-01-20 PROCEDURE — 2060000000 HC ICU INTERMEDIATE R&B

## 2025-01-20 PROCEDURE — 99233 SBSQ HOSP IP/OBS HIGH 50: CPT | Performed by: INTERNAL MEDICINE

## 2025-01-20 PROCEDURE — 85610 PROTHROMBIN TIME: CPT

## 2025-01-20 PROCEDURE — 51798 US URINE CAPACITY MEASURE: CPT

## 2025-01-20 PROCEDURE — 94761 N-INVAS EAR/PLS OXIMETRY MLT: CPT

## 2025-01-20 PROCEDURE — 2500000003 HC RX 250 WO HCPCS: Performed by: HOSPITALIST

## 2025-01-20 PROCEDURE — 97165 OT EVAL LOW COMPLEX 30 MIN: CPT

## 2025-01-20 PROCEDURE — 36415 COLL VENOUS BLD VENIPUNCTURE: CPT

## 2025-01-20 PROCEDURE — 82962 GLUCOSE BLOOD TEST: CPT

## 2025-01-20 PROCEDURE — 97116 GAIT TRAINING THERAPY: CPT

## 2025-01-20 PROCEDURE — APPSS30 APP SPLIT SHARED TIME 16-30 MINUTES: Performed by: NURSE PRACTITIONER

## 2025-01-20 RX ORDER — FUROSEMIDE 10 MG/ML
60 INJECTION INTRAMUSCULAR; INTRAVENOUS 2 TIMES DAILY
Status: DISCONTINUED | OUTPATIENT
Start: 2025-01-20 | End: 2025-01-23

## 2025-01-20 RX ORDER — WARFARIN SODIUM 5 MG/1
5 TABLET ORAL
Status: COMPLETED | OUTPATIENT
Start: 2025-01-20 | End: 2025-01-20

## 2025-01-20 RX ADMIN — RANOLAZINE 500 MG: 500 TABLET, FILM COATED, EXTENDED RELEASE ORAL at 08:24

## 2025-01-20 RX ADMIN — FUROSEMIDE 20 MG: 10 INJECTION, SOLUTION INTRAMUSCULAR; INTRAVENOUS at 00:27

## 2025-01-20 RX ADMIN — INSULIN LISPRO 5 UNITS: 100 INJECTION, SOLUTION INTRAVENOUS; SUBCUTANEOUS at 09:28

## 2025-01-20 RX ADMIN — Medication 3 MG: at 21:03

## 2025-01-20 RX ADMIN — Medication 2000 UNITS: at 08:23

## 2025-01-20 RX ADMIN — AMIODARONE HYDROCHLORIDE 400 MG: 200 TABLET ORAL at 08:24

## 2025-01-20 RX ADMIN — SODIUM CHLORIDE, PRESERVATIVE FREE 10 ML: 5 INJECTION INTRAVENOUS at 21:03

## 2025-01-20 RX ADMIN — AMIODARONE HYDROCHLORIDE 400 MG: 200 TABLET ORAL at 21:03

## 2025-01-20 RX ADMIN — METOPROLOL SUCCINATE 100 MG: 25 TABLET, EXTENDED RELEASE ORAL at 21:03

## 2025-01-20 RX ADMIN — SODIUM CHLORIDE, PRESERVATIVE FREE 10 ML: 5 INJECTION INTRAVENOUS at 08:26

## 2025-01-20 RX ADMIN — FUROSEMIDE 40 MG: 10 INJECTION, SOLUTION INTRAMUSCULAR; INTRAVENOUS at 08:23

## 2025-01-20 RX ADMIN — CYANOCOBALAMIN TAB 500 MCG 1000 MCG: 500 TAB at 08:24

## 2025-01-20 RX ADMIN — LEVOTHYROXINE SODIUM 100 MCG: 0.1 TABLET ORAL at 05:53

## 2025-01-20 RX ADMIN — METOPROLOL SUCCINATE 100 MG: 25 TABLET, EXTENDED RELEASE ORAL at 08:24

## 2025-01-20 RX ADMIN — ATORVASTATIN CALCIUM 20 MG: 20 TABLET, FILM COATED ORAL at 21:03

## 2025-01-20 RX ADMIN — FERROUS SULFATE TAB 325 MG (65 MG ELEMENTAL FE) 162.5 MG: 325 (65 FE) TAB at 08:24

## 2025-01-20 RX ADMIN — FUROSEMIDE 60 MG: 10 INJECTION, SOLUTION INTRAMUSCULAR; INTRAVENOUS at 17:47

## 2025-01-20 RX ADMIN — WARFARIN SODIUM 5 MG: 5 TABLET ORAL at 17:47

## 2025-01-20 RX ADMIN — RANOLAZINE 500 MG: 500 TABLET, FILM COATED, EXTENDED RELEASE ORAL at 21:03

## 2025-01-20 RX ADMIN — INSULIN LISPRO 5 UNITS: 100 INJECTION, SOLUTION INTRAVENOUS; SUBCUTANEOUS at 13:33

## 2025-01-20 RX ADMIN — INSULIN GLARGINE 14 UNITS: 100 INJECTION, SOLUTION SUBCUTANEOUS at 21:03

## 2025-01-20 RX ADMIN — Medication 2 TABLET: at 08:25

## 2025-01-20 RX ADMIN — OXYCODONE HYDROCHLORIDE AND ACETAMINOPHEN 250 MG: 500 TABLET ORAL at 08:24

## 2025-01-20 NOTE — PROCEDURES
Routine EEG      [place], [place]        [phone] (Main)  [phone] (Medical Records)     Routine EEG (16-channel)    Date of Study:   1-20-25  Date of Interpretation: 1/20/25    Indication:    79 y.o. female  Acute pulmonary edema [J81.0]  Acute on chronic diastolic CHF (congestive heart failure) (HCC) [I50.33]  Dyspnea, unspecified type [R06.00]    PSHx lists Cranial Surgery: No    Impression:  Mild generalized slowing during wakefulness, suggestive of an encephalopathic process, not specific as to cause.  Potential etiologies include (but not limited to): toxic, metabolic, infectious, neuro-degenerative, iatrogenic (ie sedating medication) processes, or a post-ictal state.  No epileptiform discharges were seen during this recording.  Clinical and Neuro-Imaging correlation is necessary     =================================  Technical: 16-channel, multiple montages, digital EEG, 10-20 international placement system format.   Simultaneous video recording is performed.  The technical quality of the study was adequate.  Single lead EKG was recorded.     Interpretation:      At the beginning of the recording, the patient is described as: awake, eyes closed    With eyes closed, the background is:  symmetric   The PDR is 5-6 Hz both sides    Photic stimulation: no driving response seen on either side     Hyperventilation and Post-HV: not performed  Is Drowsiness recorded: yes, normal   Is Sleep recorded: no    Areas of focal slowing: none   Epileptiform discharges: none    Single lead EKG: sinus rhythm    =================================    Home Meds    Medications Prior to Admission: metoprolol succinate (TOPROL XL) 100 MG extended release tablet, Take 1 tablet by mouth in the morning and at bedtime  insulin glargine (LANTUS SOLOSTAR) 100 UNIT/ML injection pen, Inject 16 Units into the skin nightly  warfarin (COUMADIN) 5 MG tablet, Take 1 tablet by mouth daily  gabapentin

## 2025-01-20 NOTE — CARE COORDINATION
2:39 PM  01/20/25    Care Management Progress Note    Reason for Admission:   Acute pulmonary edema [J81.0]  Acute on chronic diastolic CHF (congestive heart failure) (HCC) [I50.33]  Dyspnea, unspecified type [R06.00]         Patient Admission Status: Inpatient  Date Admitted to INP: 1/17/25   RUR: Readmission Risk Score: 27.6    Hospitalization in the last 30 days (Readmission):  Yes        Transition of care plan:  Ongoing medical management- pt discussed during IDR; MRI pending.    Anticipated discharge plan: [facility]; liaison has called today to say that physician isn't accepting pt for return as she was not tolerating the [facility] level of rehab. CM met face to face with pt and her daughter- updated them and pt verbalized understanding/agreement. She stated that she hopes to go home with home health but daughter requested information about SNF rehab. CM gave printed list of SNFs, pt and daughter will review and let CM know if pt is agreeable/has preferences.    Date IM given: 1/19/25    Outpatient follow-up.    Discharge transport: GALEN Steward

## 2025-01-20 NOTE — SIGNIFICANT EVENT
1030: Responded to RRT w CP. Pt resting in bed w nursing staff & daughter present. Pt is somewhat known to this provider, recently seen @ [facility]. Per daughter & nursing staff the pt's O2 fell off, unclear if pt's O2 desatted or not, and pt subsequently began to c/o midsternal chest pain. O2 was in place @ 5L NC on arrival to room w O2 sats 96%. Pt using accessory muscles to breathe. Pt reports she would like to go back on her home CPAP at bedside, d/w nursing & daughter OK to do so. EKG reviewed, NSR, no acute changes since last EKG 1/18. Offered pt a nebulizer tx as well, she states she does not take any nebs/inh pta and does not feel as though she needs one. She reports the CP resolved after her O2 was placed back on. She was assisted w being pulled up in the bed & repositioned which aided her dyspnea.    Notably she is currently being diuresed w Lasix 40mg IV BID for pulmonary edema, has sv mitral valve stenosis/HFpEF as well for which she is being followed by cardiology. Nephrology is following for MARINA on CKD 4. Urine in Purewick canister is dark brown, unclear if mixed w BM as nursing is unsure if it has been dark. Noted pt is s/p Rocephin for UTI.     Plan:  -Obtain repeat BMP + mg, trop, NT pro BNP  -STAT CXR  -Supportive measures for dyspnea including nebs, home CPAP, diuresis  -Bladder checks, strict I&Os    Update 2350: Noted CXR w worsening pulmonary edema, BNP increased to 3709 from 2145. D/w nephrology NP Lion prior to initiating further diuresis as pt w MARINA on CKD. Plan to diurese w additional 20mg Lasix IV for now. Also d/w RT pt's use of home CPAP, he has spoken to pt/family several times & pt declining CPAP for now.    Vitals:    01/19/25 1626 01/19/25 1830 01/19/25 2000 01/19/25 2215   BP: (!) 137/53  (!) 141/56 (!) 152/67   Pulse:  61 63 67   Resp: 18  16    Temp: 98.2 °F (36.8 °C)  98.1 °F (36.7 °C)    TempSrc: Oral  Oral    SpO2:  99% 98% 95%   Weight:       Height:          Physical Exam  HENT:

## 2025-01-21 LAB
ANION GAP SERPL CALC-SCNC: 8 MMOL/L (ref 2–12)
BASOPHILS # BLD: 0.03 K/UL (ref 0–0.1)
BASOPHILS NFR BLD: 0.3 % (ref 0–1)
BUN SERPL-MCNC: 85 MG/DL (ref 6–20)
BUN/CREAT SERPL: 33 (ref 12–20)
CALCIUM SERPL-MCNC: 9 MG/DL (ref 8.5–10.1)
CHLORIDE SERPL-SCNC: 102 MMOL/L (ref 97–108)
CO2 SERPL-SCNC: 28 MMOL/L (ref 21–32)
CREAT SERPL-MCNC: 2.57 MG/DL (ref 0.55–1.02)
DIFFERENTIAL METHOD BLD: ABNORMAL
EOSINOPHIL # BLD: 0.69 K/UL (ref 0–0.4)
EOSINOPHIL NFR BLD: 7.6 % (ref 0–7)
ERYTHROCYTE [DISTWIDTH] IN BLOOD BY AUTOMATED COUNT: 20.8 % (ref 11.5–14.5)
GLUCOSE BLD STRIP.AUTO-MCNC: 165 MG/DL (ref 65–117)
GLUCOSE BLD STRIP.AUTO-MCNC: 176 MG/DL (ref 65–117)
GLUCOSE BLD STRIP.AUTO-MCNC: 178 MG/DL (ref 65–117)
GLUCOSE BLD STRIP.AUTO-MCNC: 86 MG/DL (ref 65–117)
GLUCOSE SERPL-MCNC: 75 MG/DL (ref 65–100)
HCT VFR BLD AUTO: 22 % (ref 35–47)
HGB BLD-MCNC: 7.3 G/DL (ref 11.5–16)
IMM GRANULOCYTES # BLD AUTO: 0.07 K/UL (ref 0–0.04)
IMM GRANULOCYTES NFR BLD AUTO: 0.8 % (ref 0–0.5)
INR PPP: 2.3 (ref 0.9–1.1)
LYMPHOCYTES # BLD: 0.71 K/UL (ref 0.8–3.5)
LYMPHOCYTES NFR BLD: 7.8 % (ref 12–49)
MAGNESIUM SERPL-MCNC: 2.4 MG/DL (ref 1.6–2.4)
MCH RBC QN AUTO: 29.6 PG (ref 26–34)
MCHC RBC AUTO-ENTMCNC: 33.2 G/DL (ref 30–36.5)
MCV RBC AUTO: 89.1 FL (ref 80–99)
MONOCYTES # BLD: 0.8 K/UL (ref 0–1)
MONOCYTES NFR BLD: 8.8 % (ref 5–13)
NEUTS SEG # BLD: 6.8 K/UL (ref 1.8–8)
NEUTS SEG NFR BLD: 74.7 % (ref 32–75)
NRBC # BLD: 0 K/UL (ref 0–0.01)
NRBC BLD-RTO: 0 PER 100 WBC
PHOSPHATE SERPL-MCNC: 4 MG/DL (ref 2.6–4.7)
PLATELET # BLD AUTO: 396 K/UL (ref 150–400)
PMV BLD AUTO: 10.1 FL (ref 8.9–12.9)
POTASSIUM SERPL-SCNC: 4 MMOL/L (ref 3.5–5.1)
PROTHROMBIN TIME: 23 SEC (ref 9.2–11.2)
RBC # BLD AUTO: 2.47 M/UL (ref 3.8–5.2)
RBC MORPH BLD: ABNORMAL
SERVICE CMNT-IMP: ABNORMAL
SERVICE CMNT-IMP: NORMAL
SODIUM SERPL-SCNC: 138 MMOL/L (ref 136–145)
WBC # BLD AUTO: 9.1 K/UL (ref 3.6–11)

## 2025-01-21 PROCEDURE — 97530 THERAPEUTIC ACTIVITIES: CPT

## 2025-01-21 PROCEDURE — 94761 N-INVAS EAR/PLS OXIMETRY MLT: CPT

## 2025-01-21 PROCEDURE — 6370000000 HC RX 637 (ALT 250 FOR IP): Performed by: STUDENT IN AN ORGANIZED HEALTH CARE EDUCATION/TRAINING PROGRAM

## 2025-01-21 PROCEDURE — 2060000000 HC ICU INTERMEDIATE R&B

## 2025-01-21 PROCEDURE — 82962 GLUCOSE BLOOD TEST: CPT

## 2025-01-21 PROCEDURE — 6360000002 HC RX W HCPCS: Performed by: INTERNAL MEDICINE

## 2025-01-21 PROCEDURE — 2500000003 HC RX 250 WO HCPCS: Performed by: HOSPITALIST

## 2025-01-21 PROCEDURE — 6370000000 HC RX 637 (ALT 250 FOR IP): Performed by: NURSE PRACTITIONER

## 2025-01-21 PROCEDURE — 6370000000 HC RX 637 (ALT 250 FOR IP): Performed by: HOSPITALIST

## 2025-01-21 PROCEDURE — 99232 SBSQ HOSP IP/OBS MODERATE 35: CPT | Performed by: INTERNAL MEDICINE

## 2025-01-21 PROCEDURE — 83735 ASSAY OF MAGNESIUM: CPT

## 2025-01-21 PROCEDURE — 36415 COLL VENOUS BLD VENIPUNCTURE: CPT

## 2025-01-21 PROCEDURE — 84100 ASSAY OF PHOSPHORUS: CPT

## 2025-01-21 PROCEDURE — 85610 PROTHROMBIN TIME: CPT

## 2025-01-21 PROCEDURE — 80048 BASIC METABOLIC PNL TOTAL CA: CPT

## 2025-01-21 PROCEDURE — 6360000002 HC RX W HCPCS: Performed by: STUDENT IN AN ORGANIZED HEALTH CARE EDUCATION/TRAINING PROGRAM

## 2025-01-21 PROCEDURE — 85025 COMPLETE CBC W/AUTO DIFF WBC: CPT

## 2025-01-21 PROCEDURE — APPSS30 APP SPLIT SHARED TIME 16-30 MINUTES: Performed by: NURSE PRACTITIONER

## 2025-01-21 RX ORDER — LORAZEPAM 0.5 MG/1
0.5 TABLET ORAL 2 TIMES DAILY PRN
Status: DISCONTINUED | OUTPATIENT
Start: 2025-01-21 | End: 2025-01-24 | Stop reason: HOSPADM

## 2025-01-21 RX ORDER — WARFARIN SODIUM 1 MG/1
4 TABLET ORAL
Status: COMPLETED | OUTPATIENT
Start: 2025-01-21 | End: 2025-01-21

## 2025-01-21 RX ORDER — AMLODIPINE BESYLATE 5 MG/1
5 TABLET ORAL DAILY
Status: DISCONTINUED | OUTPATIENT
Start: 2025-01-21 | End: 2025-01-24 | Stop reason: HOSPADM

## 2025-01-21 RX ADMIN — TRAZODONE HYDROCHLORIDE 50 MG: 50 TABLET ORAL at 22:01

## 2025-01-21 RX ADMIN — TRAMADOL HYDROCHLORIDE 50 MG: 50 TABLET, COATED ORAL at 23:10

## 2025-01-21 RX ADMIN — FUROSEMIDE 60 MG: 10 INJECTION, SOLUTION INTRAMUSCULAR; INTRAVENOUS at 17:15

## 2025-01-21 RX ADMIN — Medication 2000 UNITS: at 09:00

## 2025-01-21 RX ADMIN — INSULIN GLARGINE 14 UNITS: 100 INJECTION, SOLUTION SUBCUTANEOUS at 20:23

## 2025-01-21 RX ADMIN — RANOLAZINE 500 MG: 500 TABLET, FILM COATED, EXTENDED RELEASE ORAL at 08:54

## 2025-01-21 RX ADMIN — FUROSEMIDE 60 MG: 10 INJECTION, SOLUTION INTRAMUSCULAR; INTRAVENOUS at 08:56

## 2025-01-21 RX ADMIN — CYANOCOBALAMIN TAB 500 MCG 1000 MCG: 500 TAB at 08:55

## 2025-01-21 RX ADMIN — METOPROLOL SUCCINATE 100 MG: 25 TABLET, EXTENDED RELEASE ORAL at 20:24

## 2025-01-21 RX ADMIN — ACETAMINOPHEN 650 MG: 325 TABLET ORAL at 12:05

## 2025-01-21 RX ADMIN — Medication 3 MG: at 20:24

## 2025-01-21 RX ADMIN — OXYCODONE HYDROCHLORIDE AND ACETAMINOPHEN 250 MG: 500 TABLET ORAL at 08:54

## 2025-01-21 RX ADMIN — EPOETIN ALFA-EPBX 20000 UNITS: 10000 INJECTION, SOLUTION INTRAVENOUS; SUBCUTANEOUS at 17:17

## 2025-01-21 RX ADMIN — WARFARIN SODIUM 4 MG: 1 TABLET ORAL at 17:14

## 2025-01-21 RX ADMIN — FERROUS SULFATE TAB 325 MG (65 MG ELEMENTAL FE) 162.5 MG: 325 (65 FE) TAB at 08:52

## 2025-01-21 RX ADMIN — Medication 2 TABLET: at 08:50

## 2025-01-21 RX ADMIN — AMIODARONE HYDROCHLORIDE 400 MG: 200 TABLET ORAL at 20:24

## 2025-01-21 RX ADMIN — ATORVASTATIN CALCIUM 20 MG: 20 TABLET, FILM COATED ORAL at 20:24

## 2025-01-21 RX ADMIN — LEVOTHYROXINE SODIUM 100 MCG: 0.1 TABLET ORAL at 05:36

## 2025-01-21 RX ADMIN — SODIUM CHLORIDE, PRESERVATIVE FREE 10 ML: 5 INJECTION INTRAVENOUS at 08:56

## 2025-01-21 RX ADMIN — INSULIN LISPRO 5 UNITS: 100 INJECTION, SOLUTION INTRAVENOUS; SUBCUTANEOUS at 17:18

## 2025-01-21 RX ADMIN — AMLODIPINE BESYLATE 5 MG: 5 TABLET ORAL at 08:59

## 2025-01-21 RX ADMIN — RANOLAZINE 500 MG: 500 TABLET, FILM COATED, EXTENDED RELEASE ORAL at 20:24

## 2025-01-21 RX ADMIN — AMIODARONE HYDROCHLORIDE 400 MG: 200 TABLET ORAL at 08:54

## 2025-01-21 RX ADMIN — INSULIN LISPRO 5 UNITS: 100 INJECTION, SOLUTION INTRAVENOUS; SUBCUTANEOUS at 11:59

## 2025-01-21 ASSESSMENT — PAIN SCALES - GENERAL
PAINLEVEL_OUTOF10: 9
PAINLEVEL_OUTOF10: 10
PAINLEVEL_OUTOF10: 7

## 2025-01-21 ASSESSMENT — PAIN DESCRIPTION - ORIENTATION
ORIENTATION: POSTERIOR
ORIENTATION: POSTERIOR

## 2025-01-21 ASSESSMENT — PAIN DESCRIPTION - LOCATION
LOCATION: SACRUM
LOCATION: SACRUM

## 2025-01-21 ASSESSMENT — PAIN DESCRIPTION - DESCRIPTORS
DESCRIPTORS: ACHING
DESCRIPTORS: ACHING

## 2025-01-21 NOTE — CARE COORDINATION
1/21/2025  2:35 PM  Care Management Progress Note    Reason for Admission:   Acute pulmonary edema [J81.0]  Acute on chronic diastolic CHF (congestive heart failure) (HCC) [I50.33]  Dyspnea, unspecified type [R06.00]         Patient Admission Status: Inpatient  RUR: 27%   Hospitalization in the last 30 days (Readmission):  Yes        Transition of care plan:  Pt discussed in IDR is continuing to require medical management   Pt on 4 L O2  NC, wean as tolerated, oximetry prior to DC if unable to wean and pt returning home   PT/OT treating, recc SNF @ DC.  CM spoke w/ pt's Dtr Mary, she told me after reviewing  current list of SNFs family has concerns over safety at facilities. Sandra requested I email personal care resources and HH  list to [email or web address].  Family will continue to review SNF list to provide choices for   DC plan pending family decision  Discharge plan communicated with patient and/or discharge caregiver: Yes    Date 1st IMM letter given: 1/19/25  Outpatient follow-up.  Transport at discharge: TBD pending  progress  CINTHYA Breaux

## 2025-01-22 LAB
GLUCOSE BLD STRIP.AUTO-MCNC: 117 MG/DL (ref 65–117)
GLUCOSE BLD STRIP.AUTO-MCNC: 119 MG/DL (ref 65–117)
GLUCOSE BLD STRIP.AUTO-MCNC: 131 MG/DL (ref 65–117)
GLUCOSE BLD STRIP.AUTO-MCNC: 166 MG/DL (ref 65–117)
INR PPP: 2.4 (ref 0.9–1.1)
PROTHROMBIN TIME: 24 SEC (ref 9.2–11.2)
SERVICE CMNT-IMP: ABNORMAL
SERVICE CMNT-IMP: NORMAL

## 2025-01-22 PROCEDURE — 2500000003 HC RX 250 WO HCPCS: Performed by: HOSPITALIST

## 2025-01-22 PROCEDURE — 2700000000 HC OXYGEN THERAPY PER DAY

## 2025-01-22 PROCEDURE — 6370000000 HC RX 637 (ALT 250 FOR IP): Performed by: NURSE PRACTITIONER

## 2025-01-22 PROCEDURE — 6370000000 HC RX 637 (ALT 250 FOR IP): Performed by: HOSPITALIST

## 2025-01-22 PROCEDURE — 6360000002 HC RX W HCPCS: Performed by: INTERNAL MEDICINE

## 2025-01-22 PROCEDURE — 85610 PROTHROMBIN TIME: CPT

## 2025-01-22 PROCEDURE — 6370000000 HC RX 637 (ALT 250 FOR IP): Performed by: STUDENT IN AN ORGANIZED HEALTH CARE EDUCATION/TRAINING PROGRAM

## 2025-01-22 PROCEDURE — 1100000000 HC RM PRIVATE

## 2025-01-22 PROCEDURE — 97530 THERAPEUTIC ACTIVITIES: CPT

## 2025-01-22 PROCEDURE — 82962 GLUCOSE BLOOD TEST: CPT

## 2025-01-22 RX ORDER — WARFARIN SODIUM 2 MG/1
4 TABLET ORAL
Status: COMPLETED | OUTPATIENT
Start: 2025-01-22 | End: 2025-01-22

## 2025-01-22 RX ORDER — ATORVASTATIN CALCIUM 20 MG/1
40 TABLET, FILM COATED ORAL NIGHTLY
Status: DISCONTINUED | OUTPATIENT
Start: 2025-01-22 | End: 2025-01-24 | Stop reason: HOSPADM

## 2025-01-22 RX ADMIN — SODIUM CHLORIDE, PRESERVATIVE FREE 10 ML: 5 INJECTION INTRAVENOUS at 08:57

## 2025-01-22 RX ADMIN — FUROSEMIDE 60 MG: 10 INJECTION, SOLUTION INTRAMUSCULAR; INTRAVENOUS at 17:52

## 2025-01-22 RX ADMIN — LEVOTHYROXINE SODIUM 100 MCG: 0.1 TABLET ORAL at 05:33

## 2025-01-22 RX ADMIN — INSULIN LISPRO 5 UNITS: 100 INJECTION, SOLUTION INTRAVENOUS; SUBCUTANEOUS at 17:52

## 2025-01-22 RX ADMIN — INSULIN LISPRO 5 UNITS: 100 INJECTION, SOLUTION INTRAVENOUS; SUBCUTANEOUS at 11:59

## 2025-01-22 RX ADMIN — INSULIN GLARGINE 14 UNITS: 100 INJECTION, SOLUTION SUBCUTANEOUS at 20:36

## 2025-01-22 RX ADMIN — METOPROLOL SUCCINATE 100 MG: 25 TABLET, EXTENDED RELEASE ORAL at 08:55

## 2025-01-22 RX ADMIN — AMIODARONE HYDROCHLORIDE 400 MG: 200 TABLET ORAL at 20:34

## 2025-01-22 RX ADMIN — Medication 2000 UNITS: at 08:55

## 2025-01-22 RX ADMIN — AMIODARONE HYDROCHLORIDE 400 MG: 200 TABLET ORAL at 08:55

## 2025-01-22 RX ADMIN — RANOLAZINE 500 MG: 500 TABLET, FILM COATED, EXTENDED RELEASE ORAL at 20:34

## 2025-01-22 RX ADMIN — ATORVASTATIN CALCIUM 40 MG: 20 TABLET, FILM COATED ORAL at 20:35

## 2025-01-22 RX ADMIN — FERROUS SULFATE TAB 325 MG (65 MG ELEMENTAL FE) 162.5 MG: 325 (65 FE) TAB at 08:56

## 2025-01-22 RX ADMIN — SODIUM CHLORIDE, PRESERVATIVE FREE 10 ML: 5 INJECTION INTRAVENOUS at 20:37

## 2025-01-22 RX ADMIN — CYANOCOBALAMIN TAB 500 MCG 1000 MCG: 500 TAB at 08:56

## 2025-01-22 RX ADMIN — FUROSEMIDE 60 MG: 10 INJECTION, SOLUTION INTRAMUSCULAR; INTRAVENOUS at 08:54

## 2025-01-22 RX ADMIN — TRAMADOL HYDROCHLORIDE 50 MG: 50 TABLET, COATED ORAL at 05:33

## 2025-01-22 RX ADMIN — METOPROLOL SUCCINATE 100 MG: 25 TABLET, EXTENDED RELEASE ORAL at 20:34

## 2025-01-22 RX ADMIN — AMLODIPINE BESYLATE 5 MG: 5 TABLET ORAL at 08:56

## 2025-01-22 RX ADMIN — WARFARIN SODIUM 4 MG: 2 TABLET ORAL at 17:53

## 2025-01-22 RX ADMIN — TRAZODONE HYDROCHLORIDE 50 MG: 50 TABLET ORAL at 23:45

## 2025-01-22 RX ADMIN — RANOLAZINE 500 MG: 500 TABLET, FILM COATED, EXTENDED RELEASE ORAL at 08:55

## 2025-01-22 RX ADMIN — OXYCODONE HYDROCHLORIDE AND ACETAMINOPHEN 250 MG: 500 TABLET ORAL at 08:56

## 2025-01-22 RX ADMIN — Medication 3 MG: at 20:35

## 2025-01-22 RX ADMIN — Medication 2 TABLET: at 09:17

## 2025-01-22 ASSESSMENT — PAIN SCALES - GENERAL
PAINLEVEL_OUTOF10: 0
PAINLEVEL_OUTOF10: 8

## 2025-01-22 NOTE — FLOWSHEET NOTE
2100  Patient called out stating she thinks the \"used phone given to her on day shift is someone else's phone\" and she is frustrated. Stated people keep calling her and asking for other people and trying to sell her things. I stated sometimes that happens and apologized.    2128  Patient called out stating she did not like the settings on her CPAP machine. Stated she thought too much air was being blown out. Patient also complaining about phone still.     2129  Paged RT to check settings on CPAP machine.     2130  Patient removed CPAP and RN placed patient back on NC.     2157  RT Perfect[employer] asking if it was still her home machine and came to bedside to check settings.    2200  RT stated home machine was set to her home settings and they are not allowed to change settings per policy.    2225  Patient called out stating she was uncomfortable and wanted to sleep. RN offered PRN trazadone per order and patient accepted. RN and charge RN at bedside to reposition patient. Patient turned to L side. Patient complained about room phone. RN asked patient if she would like me to unplug her phone so people would stop calling into her room. Patient stated \"yes.\" RN unplugged phone per patient's request.    2245  Patient called out again stating she is uncomfortable. Bedside RN in other patient's room.     2250  Patient continued to call out. Charge RN went into patient's room and patient is very agitated and frustrated.     2310  PRN tramadol given per order for 10/10 pain in feet. Feet repositioned and pillow placed under heels. Patient stated feet feel a lot better.

## 2025-01-22 NOTE — CARE COORDINATION
1/22/2025  8:16 AM     01/22/25 0815   Service Assessment   Patient Orientation Person   Discharge Planning   Patient expects to be discharged to: Skilled nursing facility   Services At/After Discharge   Transition of Care Consult (CM Consult) SNF   Partner SNF Yes  ([facility])   Condition of Participation: Discharge Planning   The Plan for Transition of Care is related to the following treatment goals: SNF   The Patient and/or Patient Representative was provided with a Choice of Provider? Patient Representative   Name of the Patient Representative who was provided with the Choice of Provider and agrees with the Discharge Plan?  daughter Mary Cantor   The Patient and/Or Patient Representative agree with the Discharge Plan? (S)  Yes   Freedom of Choice list was provided with basic dialogue that supports the patient's individualized plan of care/goals, treatment preferences, and shares the quality data associated with the providers?  (S)  Yes     CM received message from pt's Dtr Mary, preferred SNF is [facility], referral sent in [facility] and is pending review   CINTHYA Breaux

## 2025-01-22 NOTE — CARE COORDINATION
1:51 PM  01/22/25    Care Management Progress Note    Reason for Admission:   Acute pulmonary edema [J81.0]  Acute on chronic diastolic CHF (congestive heart failure) (HCC) [I50.33]  Dyspnea, unspecified type [R06.00]         Patient Admission Status: Inpatient  Date Admitted to INP:  1/17/25     RUR: Readmission Risk Score: 27.5    Hospitalization in the last 30 days (Readmission):  No        Transition of care plan:  Ongoing medical management- pt discussed during IDR; nephrology and therapy following.   Anticipated discharge plan: short term SNF rehab; referral sent to and accepted by [facility]. CM called pt's daughter Mary- she verbalized understanding and confirmed preference for this SNF; pending bed availability. CM updated SNF with [facility] via [employer].  Date IM given: 1/19/25      Outpatient follow-up.  Discharge transport: TBD    CM will continue to follow and assist with discharge needs.    GALEN Gu

## 2025-01-23 LAB
ANION GAP SERPL CALC-SCNC: 5 MMOL/L (ref 2–12)
BASOPHILS # BLD: 0.06 K/UL (ref 0–0.1)
BASOPHILS NFR BLD: 0.6 % (ref 0–1)
BUN SERPL-MCNC: 89 MG/DL (ref 6–20)
BUN/CREAT SERPL: 32 (ref 12–20)
CALCIUM SERPL-MCNC: 9.6 MG/DL (ref 8.5–10.1)
CHLORIDE SERPL-SCNC: 100 MMOL/L (ref 97–108)
CO2 SERPL-SCNC: 30 MMOL/L (ref 21–32)
CREAT SERPL-MCNC: 2.74 MG/DL (ref 0.55–1.02)
DIFFERENTIAL METHOD BLD: ABNORMAL
EOSINOPHIL # BLD: 0.99 K/UL (ref 0–0.4)
EOSINOPHIL NFR BLD: 10.3 % (ref 0–7)
ERYTHROCYTE [DISTWIDTH] IN BLOOD BY AUTOMATED COUNT: 20.9 % (ref 11.5–14.5)
GLUCOSE BLD STRIP.AUTO-MCNC: 130 MG/DL (ref 65–117)
GLUCOSE BLD STRIP.AUTO-MCNC: 135 MG/DL (ref 65–117)
GLUCOSE BLD STRIP.AUTO-MCNC: 156 MG/DL (ref 65–117)
GLUCOSE BLD STRIP.AUTO-MCNC: 187 MG/DL (ref 65–117)
GLUCOSE SERPL-MCNC: 103 MG/DL (ref 65–100)
HCT VFR BLD AUTO: 24 % (ref 35–47)
HGB BLD-MCNC: 7.7 G/DL (ref 11.5–16)
IMM GRANULOCYTES # BLD AUTO: 0.1 K/UL (ref 0–0.04)
IMM GRANULOCYTES NFR BLD AUTO: 1 % (ref 0–0.5)
INR PPP: 2.5 (ref 0.9–1.1)
LYMPHOCYTES # BLD: 1.26 K/UL (ref 0.8–3.5)
LYMPHOCYTES NFR BLD: 13.1 % (ref 12–49)
MAGNESIUM SERPL-MCNC: 2.4 MG/DL (ref 1.6–2.4)
MCH RBC QN AUTO: 29.3 PG (ref 26–34)
MCHC RBC AUTO-ENTMCNC: 32.1 G/DL (ref 30–36.5)
MCV RBC AUTO: 91.3 FL (ref 80–99)
MONOCYTES # BLD: 0.85 K/UL (ref 0–1)
MONOCYTES NFR BLD: 8.9 % (ref 5–13)
NEUTS SEG # BLD: 6.34 K/UL (ref 1.8–8)
NEUTS SEG NFR BLD: 66.1 % (ref 32–75)
NRBC # BLD: 0.03 K/UL (ref 0–0.01)
NRBC BLD-RTO: 0.3 PER 100 WBC
PHOSPHATE SERPL-MCNC: 4 MG/DL (ref 2.6–4.7)
PLATELET # BLD AUTO: 477 K/UL (ref 150–400)
PMV BLD AUTO: 10.4 FL (ref 8.9–12.9)
POTASSIUM SERPL-SCNC: 3.7 MMOL/L (ref 3.5–5.1)
PROTHROMBIN TIME: 24.5 SEC (ref 9.2–11.2)
RBC # BLD AUTO: 2.63 M/UL (ref 3.8–5.2)
RBC MORPH BLD: ABNORMAL
RBC MORPH BLD: ABNORMAL
SERVICE CMNT-IMP: ABNORMAL
SODIUM SERPL-SCNC: 135 MMOL/L (ref 136–145)
WBC # BLD AUTO: 9.6 K/UL (ref 3.6–11)

## 2025-01-23 PROCEDURE — 85025 COMPLETE CBC W/AUTO DIFF WBC: CPT

## 2025-01-23 PROCEDURE — 94761 N-INVAS EAR/PLS OXIMETRY MLT: CPT

## 2025-01-23 PROCEDURE — 2700000000 HC OXYGEN THERAPY PER DAY

## 2025-01-23 PROCEDURE — 97530 THERAPEUTIC ACTIVITIES: CPT

## 2025-01-23 PROCEDURE — 6370000000 HC RX 637 (ALT 250 FOR IP): Performed by: HOSPITALIST

## 2025-01-23 PROCEDURE — 84100 ASSAY OF PHOSPHORUS: CPT

## 2025-01-23 PROCEDURE — 6360000002 HC RX W HCPCS: Performed by: STUDENT IN AN ORGANIZED HEALTH CARE EDUCATION/TRAINING PROGRAM

## 2025-01-23 PROCEDURE — 80048 BASIC METABOLIC PNL TOTAL CA: CPT

## 2025-01-23 PROCEDURE — 97535 SELF CARE MNGMENT TRAINING: CPT | Performed by: OCCUPATIONAL THERAPIST

## 2025-01-23 PROCEDURE — 83735 ASSAY OF MAGNESIUM: CPT

## 2025-01-23 PROCEDURE — 85610 PROTHROMBIN TIME: CPT

## 2025-01-23 PROCEDURE — 1100000000 HC RM PRIVATE

## 2025-01-23 PROCEDURE — 2500000003 HC RX 250 WO HCPCS: Performed by: HOSPITALIST

## 2025-01-23 PROCEDURE — 6370000000 HC RX 637 (ALT 250 FOR IP): Performed by: STUDENT IN AN ORGANIZED HEALTH CARE EDUCATION/TRAINING PROGRAM

## 2025-01-23 PROCEDURE — 82962 GLUCOSE BLOOD TEST: CPT

## 2025-01-23 PROCEDURE — 6370000000 HC RX 637 (ALT 250 FOR IP): Performed by: NURSE PRACTITIONER

## 2025-01-23 RX ORDER — RANOLAZINE 500 MG/1
500 TABLET, EXTENDED RELEASE ORAL DAILY
Status: DISCONTINUED | OUTPATIENT
Start: 2025-01-24 | End: 2025-01-24 | Stop reason: HOSPADM

## 2025-01-23 RX ORDER — WARFARIN SODIUM 5 MG/1
5 TABLET ORAL
Status: COMPLETED | OUTPATIENT
Start: 2025-01-23 | End: 2025-01-23

## 2025-01-23 RX ORDER — AMIODARONE HYDROCHLORIDE 200 MG/1
200 TABLET ORAL DAILY
Status: DISCONTINUED | OUTPATIENT
Start: 2025-01-26 | End: 2025-01-24 | Stop reason: HOSPADM

## 2025-01-23 RX ORDER — LOSARTAN POTASSIUM 25 MG/1
25 TABLET ORAL DAILY
Status: DISCONTINUED | OUTPATIENT
Start: 2025-01-23 | End: 2025-01-24

## 2025-01-23 RX ORDER — FUROSEMIDE 40 MG/1
40 TABLET ORAL 2 TIMES DAILY
Status: DISCONTINUED | OUTPATIENT
Start: 2025-01-24 | End: 2025-01-24 | Stop reason: HOSPADM

## 2025-01-23 RX ORDER — FUROSEMIDE 40 MG/1
40 TABLET ORAL 2 TIMES DAILY
Status: DISCONTINUED | OUTPATIENT
Start: 2025-01-23 | End: 2025-01-23

## 2025-01-23 RX ORDER — FUROSEMIDE 10 MG/ML
40 INJECTION INTRAMUSCULAR; INTRAVENOUS ONCE
Status: COMPLETED | OUTPATIENT
Start: 2025-01-23 | End: 2025-01-23

## 2025-01-23 RX ADMIN — TRAZODONE HYDROCHLORIDE 50 MG: 50 TABLET ORAL at 21:37

## 2025-01-23 RX ADMIN — ATORVASTATIN CALCIUM 40 MG: 20 TABLET, FILM COATED ORAL at 21:37

## 2025-01-23 RX ADMIN — ACETAMINOPHEN 650 MG: 325 TABLET ORAL at 13:33

## 2025-01-23 RX ADMIN — SODIUM CHLORIDE, PRESERVATIVE FREE 10 ML: 5 INJECTION INTRAVENOUS at 10:00

## 2025-01-23 RX ADMIN — METOPROLOL SUCCINATE 100 MG: 25 TABLET, EXTENDED RELEASE ORAL at 10:09

## 2025-01-23 RX ADMIN — Medication 3 MG: at 21:38

## 2025-01-23 RX ADMIN — Medication 2 TABLET: at 12:59

## 2025-01-23 RX ADMIN — AMLODIPINE BESYLATE 5 MG: 5 TABLET ORAL at 10:09

## 2025-01-23 RX ADMIN — RANOLAZINE 500 MG: 500 TABLET, FILM COATED, EXTENDED RELEASE ORAL at 10:10

## 2025-01-23 RX ADMIN — FUROSEMIDE 40 MG: 10 INJECTION, SOLUTION INTRAMUSCULAR; INTRAVENOUS at 12:03

## 2025-01-23 RX ADMIN — Medication 2000 UNITS: at 10:08

## 2025-01-23 RX ADMIN — TRAMADOL HYDROCHLORIDE 50 MG: 50 TABLET, COATED ORAL at 10:10

## 2025-01-23 RX ADMIN — INSULIN LISPRO 5 UNITS: 100 INJECTION, SOLUTION INTRAVENOUS; SUBCUTANEOUS at 12:59

## 2025-01-23 RX ADMIN — CYANOCOBALAMIN TAB 500 MCG 1000 MCG: 500 TAB at 10:09

## 2025-01-23 RX ADMIN — LEVOTHYROXINE SODIUM 100 MCG: 0.1 TABLET ORAL at 07:09

## 2025-01-23 RX ADMIN — INSULIN GLARGINE 14 UNITS: 100 INJECTION, SOLUTION SUBCUTANEOUS at 21:39

## 2025-01-23 RX ADMIN — AMIODARONE HYDROCHLORIDE 400 MG: 200 TABLET ORAL at 21:38

## 2025-01-23 RX ADMIN — AMIODARONE HYDROCHLORIDE 400 MG: 200 TABLET ORAL at 10:08

## 2025-01-23 RX ADMIN — WARFARIN SODIUM 5 MG: 5 TABLET ORAL at 18:19

## 2025-01-23 RX ADMIN — FERROUS SULFATE TAB 325 MG (65 MG ELEMENTAL FE) 162.5 MG: 325 (65 FE) TAB at 10:09

## 2025-01-23 RX ADMIN — TRAMADOL HYDROCHLORIDE 50 MG: 50 TABLET, COATED ORAL at 21:37

## 2025-01-23 RX ADMIN — OXYCODONE HYDROCHLORIDE AND ACETAMINOPHEN 250 MG: 500 TABLET ORAL at 10:08

## 2025-01-23 RX ADMIN — SODIUM CHLORIDE, PRESERVATIVE FREE 10 ML: 5 INJECTION INTRAVENOUS at 21:40

## 2025-01-23 RX ADMIN — METOPROLOL SUCCINATE 100 MG: 25 TABLET, EXTENDED RELEASE ORAL at 21:36

## 2025-01-23 ASSESSMENT — PAIN DESCRIPTION - DESCRIPTORS
DESCRIPTORS: SORE
DESCRIPTORS: ACHING

## 2025-01-23 ASSESSMENT — PAIN DESCRIPTION - FREQUENCY: FREQUENCY: INTERMITTENT

## 2025-01-23 ASSESSMENT — PAIN SCALES - GENERAL
PAINLEVEL_OUTOF10: 0
PAINLEVEL_OUTOF10: 0
PAINLEVEL_OUTOF10: 7
PAINLEVEL_OUTOF10: 7
PAINLEVEL_OUTOF10: 5

## 2025-01-23 ASSESSMENT — PAIN - FUNCTIONAL ASSESSMENT: PAIN_FUNCTIONAL_ASSESSMENT: ACTIVITIES ARE NOT PREVENTED

## 2025-01-23 ASSESSMENT — PAIN DESCRIPTION - PAIN TYPE: TYPE: ACUTE PAIN;CHRONIC PAIN

## 2025-01-23 ASSESSMENT — PAIN DESCRIPTION - LOCATION
LOCATION: LEG;FOOT
LOCATION: BUTTOCKS
LOCATION: LEG

## 2025-01-23 ASSESSMENT — PAIN DESCRIPTION - ORIENTATION: ORIENTATION: LEFT;RIGHT

## 2025-01-23 ASSESSMENT — PAIN DESCRIPTION - ONSET: ONSET: ON-GOING

## 2025-01-23 NOTE — CARE COORDINATION
Care Management Progress Note    Reason for Admission:   Acute pulmonary edema [J81.0]  Acute on chronic diastolic CHF (congestive heart failure) ([facility]) [I50.33]  Dyspnea, unspecified type [R06.00]         Patient Admission Status: Inpatient  RUR:  28%; LOS 5 days  Hospitalization in the last 30 days (Readmission):  No        Pt transferred from [facility] to the 5th floor.  EMR reviewed and handoff received from previous  ([facility]).    Dtr Mary (c. [phone]) is the primary family contact.    Transition Plan of Care:  Neurology, Cardiology following for medical management   PT/OT evals complete; pt was mod assistance with bed mobility on 1/22 and SNF was recommended.    SNF - accepted by [facility] and anticipate available bed on Friday, 1/24  Outpatient follow up  Ambulance transport will be needed    CM will continue to follow pt for SNF  Vinay

## 2025-01-24 VITALS
HEART RATE: 60 BPM | WEIGHT: 194.6 LBS | RESPIRATION RATE: 17 BRPM | DIASTOLIC BLOOD PRESSURE: 56 MMHG | SYSTOLIC BLOOD PRESSURE: 149 MMHG | TEMPERATURE: 97.7 F | BODY MASS INDEX: 31.27 KG/M2 | HEIGHT: 66 IN | OXYGEN SATURATION: 93 %

## 2025-01-24 LAB
ANION GAP SERPL CALC-SCNC: 7 MMOL/L (ref 2–12)
BASOPHILS # BLD: 0.05 K/UL (ref 0–0.1)
BASOPHILS NFR BLD: 0.5 % (ref 0–1)
BUN SERPL-MCNC: 77 MG/DL (ref 6–20)
BUN/CREAT SERPL: 30 (ref 12–20)
CALCIUM SERPL-MCNC: 9.8 MG/DL (ref 8.5–10.1)
CHLORIDE SERPL-SCNC: 100 MMOL/L (ref 97–108)
CO2 SERPL-SCNC: 29 MMOL/L (ref 21–32)
CREAT SERPL-MCNC: 2.54 MG/DL (ref 0.55–1.02)
DIFFERENTIAL METHOD BLD: ABNORMAL
EOSINOPHIL # BLD: 0.89 K/UL (ref 0–0.4)
EOSINOPHIL NFR BLD: 9.8 % (ref 0–7)
ERYTHROCYTE [DISTWIDTH] IN BLOOD BY AUTOMATED COUNT: 20.8 % (ref 11.5–14.5)
GLUCOSE BLD STRIP.AUTO-MCNC: 131 MG/DL (ref 65–117)
GLUCOSE BLD STRIP.AUTO-MCNC: 146 MG/DL (ref 65–117)
GLUCOSE SERPL-MCNC: 110 MG/DL (ref 65–100)
HCT VFR BLD AUTO: 22.8 % (ref 35–47)
HGB BLD-MCNC: 7.6 G/DL (ref 11.5–16)
IMM GRANULOCYTES # BLD AUTO: 0.11 K/UL (ref 0–0.04)
IMM GRANULOCYTES NFR BLD AUTO: 1.2 % (ref 0–0.5)
INR PPP: 2.5 (ref 0.9–1.1)
LYMPHOCYTES # BLD: 1.05 K/UL (ref 0.8–3.5)
LYMPHOCYTES NFR BLD: 11.5 % (ref 12–49)
MAGNESIUM SERPL-MCNC: 2.4 MG/DL (ref 1.6–2.4)
MCH RBC QN AUTO: 29.8 PG (ref 26–34)
MCHC RBC AUTO-ENTMCNC: 33.3 G/DL (ref 30–36.5)
MCV RBC AUTO: 89.4 FL (ref 80–99)
MONOCYTES # BLD: 0.76 K/UL (ref 0–1)
MONOCYTES NFR BLD: 8.4 % (ref 5–13)
NEUTS SEG # BLD: 6.24 K/UL (ref 1.8–8)
NEUTS SEG NFR BLD: 68.6 % (ref 32–75)
NRBC # BLD: 0.07 K/UL (ref 0–0.01)
NRBC BLD-RTO: 0.8 PER 100 WBC
PHOSPHATE SERPL-MCNC: 3.9 MG/DL (ref 2.6–4.7)
PLATELET # BLD AUTO: 503 K/UL (ref 150–400)
PMV BLD AUTO: 9.8 FL (ref 8.9–12.9)
POTASSIUM SERPL-SCNC: 3.9 MMOL/L (ref 3.5–5.1)
PROTHROMBIN TIME: 25.3 SEC (ref 9.2–11.2)
RBC # BLD AUTO: 2.55 M/UL (ref 3.8–5.2)
SERVICE CMNT-IMP: ABNORMAL
SERVICE CMNT-IMP: ABNORMAL
SODIUM SERPL-SCNC: 136 MMOL/L (ref 136–145)
WBC # BLD AUTO: 9.1 K/UL (ref 3.6–11)

## 2025-01-24 PROCEDURE — 82962 GLUCOSE BLOOD TEST: CPT

## 2025-01-24 PROCEDURE — 6370000000 HC RX 637 (ALT 250 FOR IP): Performed by: INTERNAL MEDICINE

## 2025-01-24 PROCEDURE — 6370000000 HC RX 637 (ALT 250 FOR IP): Performed by: HOSPITALIST

## 2025-01-24 PROCEDURE — 84100 ASSAY OF PHOSPHORUS: CPT

## 2025-01-24 PROCEDURE — 2500000003 HC RX 250 WO HCPCS: Performed by: HOSPITALIST

## 2025-01-24 PROCEDURE — 6370000000 HC RX 637 (ALT 250 FOR IP): Performed by: STUDENT IN AN ORGANIZED HEALTH CARE EDUCATION/TRAINING PROGRAM

## 2025-01-24 PROCEDURE — 85025 COMPLETE CBC W/AUTO DIFF WBC: CPT

## 2025-01-24 PROCEDURE — 6370000000 HC RX 637 (ALT 250 FOR IP): Performed by: NURSE PRACTITIONER

## 2025-01-24 PROCEDURE — 36415 COLL VENOUS BLD VENIPUNCTURE: CPT

## 2025-01-24 PROCEDURE — 80048 BASIC METABOLIC PNL TOTAL CA: CPT

## 2025-01-24 PROCEDURE — 83735 ASSAY OF MAGNESIUM: CPT

## 2025-01-24 PROCEDURE — 85610 PROTHROMBIN TIME: CPT

## 2025-01-24 RX ORDER — DEXTROSE MONOHYDRATE 100 MG/ML
INJECTION, SOLUTION INTRAVENOUS CONTINUOUS PRN
Status: DISCONTINUED | OUTPATIENT
Start: 2025-01-24 | End: 2025-01-24 | Stop reason: HOSPADM

## 2025-01-24 RX ORDER — LOSARTAN POTASSIUM 25 MG/1
25 TABLET ORAL DAILY
Qty: 30 TABLET | Refills: 0 | Status: SHIPPED
Start: 2025-01-25 | End: 2025-01-24 | Stop reason: HOSPADM

## 2025-01-24 RX ORDER — LOSARTAN POTASSIUM 50 MG/1
50 TABLET ORAL DAILY
Status: DISCONTINUED | OUTPATIENT
Start: 2025-01-25 | End: 2025-01-24 | Stop reason: HOSPADM

## 2025-01-24 RX ORDER — CASTOR OIL AND BALSAM, PERU 788; 87 MG/G; MG/G
OINTMENT TOPICAL 2 TIMES DAILY
Status: DISCONTINUED | OUTPATIENT
Start: 2025-01-24 | End: 2025-01-24 | Stop reason: HOSPADM

## 2025-01-24 RX ORDER — WARFARIN SODIUM 2 MG/1
4 TABLET ORAL
Status: DISCONTINUED | OUTPATIENT
Start: 2025-01-24 | End: 2025-01-24 | Stop reason: HOSPADM

## 2025-01-24 RX ORDER — FUROSEMIDE 40 MG/1
40 TABLET ORAL 2 TIMES DAILY
Qty: 60 TABLET | Refills: 3 | Status: SHIPPED
Start: 2025-01-24

## 2025-01-24 RX ORDER — AMIODARONE HYDROCHLORIDE 400 MG/1
400 TABLET ORAL 2 TIMES DAILY
Qty: 3 TABLET | Refills: 0 | Status: SHIPPED
Start: 2025-01-24 | End: 2025-01-26

## 2025-01-24 RX ORDER — AMIODARONE HYDROCHLORIDE 200 MG/1
200 TABLET ORAL DAILY
Qty: 30 TABLET | Refills: 0 | Status: SHIPPED
Start: 2025-01-26 | End: 2025-02-25

## 2025-01-24 RX ORDER — WARFARIN SODIUM 4 MG/1
TABLET ORAL
Qty: 30 TABLET | Refills: 0 | Status: SHIPPED
Start: 2025-01-24

## 2025-01-24 RX ORDER — ATORVASTATIN CALCIUM 40 MG/1
40 TABLET, FILM COATED ORAL DAILY
Qty: 30 TABLET | Refills: 0 | Status: SHIPPED
Start: 2025-01-24

## 2025-01-24 RX ORDER — INSULIN LISPRO 100 [IU]/ML
0-4 INJECTION, SOLUTION INTRAVENOUS; SUBCUTANEOUS
Status: DISCONTINUED | OUTPATIENT
Start: 2025-01-24 | End: 2025-01-24 | Stop reason: HOSPADM

## 2025-01-24 RX ORDER — AMLODIPINE BESYLATE 5 MG/1
5 TABLET ORAL DAILY
Qty: 30 TABLET | Refills: 0 | Status: SHIPPED
Start: 2025-01-25

## 2025-01-24 RX ORDER — CASTOR OIL AND BALSAM, PERU 788; 87 MG/G; MG/G
OINTMENT TOPICAL 2 TIMES DAILY
Qty: 1 EACH | Refills: 0 | Status: SHIPPED
Start: 2025-01-24

## 2025-01-24 RX ORDER — LOSARTAN POTASSIUM 50 MG/1
50 TABLET ORAL DAILY
Qty: 30 TABLET | Refills: 3 | Status: SHIPPED
Start: 2025-01-25

## 2025-01-24 RX ORDER — TRAMADOL HYDROCHLORIDE 50 MG/1
25 TABLET ORAL EVERY 8 HOURS PRN
Qty: 5 TABLET | Refills: 0 | Status: SHIPPED | OUTPATIENT
Start: 2025-01-24 | End: 2025-01-27

## 2025-01-24 RX ADMIN — FUROSEMIDE 40 MG: 40 TABLET ORAL at 09:10

## 2025-01-24 RX ADMIN — CASTOR OIL AND BALSAM, PERU: 788; 87 OINTMENT TOPICAL at 14:06

## 2025-01-24 RX ADMIN — SENNOSIDES AND DOCUSATE SODIUM 2 TABLET: 50; 8.6 TABLET ORAL at 09:09

## 2025-01-24 RX ADMIN — AMIODARONE HYDROCHLORIDE 400 MG: 200 TABLET ORAL at 09:10

## 2025-01-24 RX ADMIN — LOSARTAN POTASSIUM 25 MG: 25 TABLET, FILM COATED ORAL at 09:11

## 2025-01-24 RX ADMIN — CYANOCOBALAMIN TAB 500 MCG 1000 MCG: 500 TAB at 09:11

## 2025-01-24 RX ADMIN — AMLODIPINE BESYLATE 5 MG: 5 TABLET ORAL at 09:10

## 2025-01-24 RX ADMIN — METOPROLOL SUCCINATE 100 MG: 25 TABLET, EXTENDED RELEASE ORAL at 09:11

## 2025-01-24 RX ADMIN — Medication 2 TABLET: at 09:26

## 2025-01-24 RX ADMIN — FERROUS SULFATE TAB 325 MG (65 MG ELEMENTAL FE) 162.5 MG: 325 (65 FE) TAB at 09:10

## 2025-01-24 RX ADMIN — Medication 2000 UNITS: at 09:09

## 2025-01-24 RX ADMIN — LEVOTHYROXINE SODIUM 100 MCG: 0.1 TABLET ORAL at 06:47

## 2025-01-24 RX ADMIN — OXYCODONE HYDROCHLORIDE AND ACETAMINOPHEN 250 MG: 500 TABLET ORAL at 09:11

## 2025-01-24 RX ADMIN — SODIUM CHLORIDE, PRESERVATIVE FREE 10 ML: 5 INJECTION INTRAVENOUS at 09:12

## 2025-01-24 ASSESSMENT — PAIN SCALES - GENERAL: PAINLEVEL_OUTOF10: 0

## 2025-01-24 NOTE — PLAN OF CARE
Problem: Occupational Therapy - Adult  Goal: By Discharge: Performs self-care activities at highest level of function for planned discharge setting.  See evaluation for individualized goals.  Description: FUNCTIONAL STATUS PRIOR TO ADMISSION:  Patient was MOD I for ADLs and using rollator for mobility prior to recent admission at [facility] (1/7/25-1/15/25) and was discharged to [facility] before re-admission on 1/1/25.  Receives Help From: Family, Prior Level of Assist for ADLs: Independent,  ,  ,  ,  ,  , Prior Level of Assist for Homemaking: Needs assistance, Ambulation Assistance: Independent (using a rolling walker, prior to IRF placement.),  , Active : No     HOME SUPPORT: Patient lived with her daughter prior to recent admission earlier this month and was discharged to [facility] on 1/15/2025. Re-admitted here at [facility] from [facility].    Occupational Therapy Goals:  Initiated 1/20/2025  1.  Patient will perform grooming, seated EOB or in chair, with Set-up within 7 day(s).  2.  Patient will perform upper body dressing with Set-up within 7 day(s).  3.  Patient will perform seated bathing from neck to knees with Minimal Assist within 7 day(s).  4.  Patient will perform toilet transfers to [facility] with Minimal Assist  within 7 day(s).  5.  Patient will perform all aspects of toileting with Moderate Assist within 7 day(s).  6.  Patient will participate in upper extremity therapeutic exercise/activities with Supervision for 10 minutes within 7 day(s).    Outcome: Progressing     OCCUPATIONAL THERAPY EVALUATION    Patient: Cuca Padron (79 y.o. female)  Date: 1/20/2025  Primary Diagnosis: Acute pulmonary edema [J81.0]  Acute on chronic diastolic CHF (congestive heart failure) ([facility]) [I50.33]  Dyspnea, unspecified type [R06.00]       Precautions:    ASSESSMENT :  The patient is limited by decreased functional mobility, independence in ADLs, high-level IADLs, strength, activity tolerance, endurance, balance on day 3 of 
  Problem: Occupational Therapy - Adult  Goal: By Discharge: Performs self-care activities at highest level of function for planned discharge setting.  See evaluation for individualized goals.  Description: FUNCTIONAL STATUS PRIOR TO ADMISSION:  Patient was MOD I for ADLs and using rollator for mobility prior to recent admission at [facility] (1/7/25-1/15/25) and was discharged to [facility] before re-admission on 1/1/25.  Receives Help From: Family, Prior Level of Assist for ADLs: Independent,  ,  ,  ,  ,  , Prior Level of Assist for Homemaking: Needs assistance, Ambulation Assistance: Independent (using a rolling walker, prior to IRF placement.),  , Active : No     HOME SUPPORT: Patient lived with her daughter prior to recent admission earlier this month and was discharged to [facility] on 1/15/2025. Re-admitted here at [facility] from [facility].    Occupational Therapy Goals:  Initiated 1/20/2025  1.  Patient will perform grooming, seated EOB or in chair, with Set-up within 7 day(s).  2.  Patient will perform upper body dressing with Set-up within 7 day(s).  3.  Patient will perform seated bathing from neck to knees with Minimal Assist within 7 day(s).  4.  Patient will perform toilet transfers to [facility] with Minimal Assist  within 7 day(s).  5.  Patient will perform all aspects of toileting with Moderate Assist within 7 day(s).  6.  Patient will participate in upper extremity therapeutic exercise/activities with Supervision for 10 minutes within 7 day(s).    Outcome: Progressing    OCCUPATIONAL THERAPY TREATMENT  Patient: Cuca Padron (79 y.o. female)  Date: 1/23/2025  Primary Diagnosis: Acute pulmonary edema [J81.0]  Acute on chronic diastolic CHF (congestive heart failure) ([facility]) [I50.33]  Dyspnea, unspecified type [R06.00]       Precautions:                  Chart, occupational therapy assessment, plan of care, and goals were reviewed.    ASSESSMENT  Patient continues to benefit from skilled OT services and is progressing 
  Problem: Physical Therapy - Adult  Goal: By Discharge: Performs mobility at highest level of function for planned discharge setting.  See evaluation for individualized goals.  Description: FUNCTIONAL STATUS PRIOR TO ADMISSION: recent hospital admission on 1/08 then d/c to [facility].  Prior to initial hospital admission patient was ambualtory with a rollator and lives with her daughter    HOME SUPPORT PRIOR TO ADMISSION:     Physical Therapy Goals  Initiated 1/19/2025  1.  Patient will move from supine to sit and sit to supine in bed with moderate assistance x1 within 7 day(s).    2.  Patient will perform sit to stand with maximal assistance x1 within 7 day(s).  3.  Patient will transfer from bed to chair and chair to bed with maximal assistance x1 using the least restrictive device within 7 day(s).  4.  Patient will ambulate with maximal assistance x1 for 25 feet with the least restrictive device within 7 day(s).     Outcome: Progressing   PHYSICAL THERAPY EVALUATION    Patient: Cuca Padron (79 y.o. female)  Date: 1/19/2025  Primary Diagnosis: Acute pulmonary edema [J81.0]  Acute on chronic diastolic CHF (congestive heart failure) ([facility]) [I50.33]  Dyspnea, unspecified type [R06.00]       Precautions:                        ASSESSMENT :   DEFICITS/IMPAIRMENTS:   The patient is limited by decreased functional mobility, strength, endurance, coordination and overall activity tolerance following re-admission for inpatient rehab ([facility]) for shortness of breath and hypoxia.  Patient initially presented on 1/8 to [facility] due to a-fib with RVR and fall, she was requiring MAX A x2 at that times and rehab recommended, she discharged to [facility] on 1/13.  She has a history of anemia, HTN, CAD and DM.      Based on the impairments listed above Patient today now requiring 4L NC, but sats stable on 4L.  Patient is reporting increased LE leg pain, which nursing is aware.  Patient was soiled and able to participate with rolling for 
  Problem: Physical Therapy - Adult  Goal: By Discharge: Performs mobility at highest level of function for planned discharge setting.  See evaluation for individualized goals.  Description: FUNCTIONAL STATUS PRIOR TO ADMISSION: recent hospital admission on 1/10 then d/c to [facility].  Prior to initial hospital admission patient was ambualtory with a RW>     HOME SUPPORT PRIOR TO ADMISSION:     Physical Therapy Goals  Initiated 1/19/2025  1.  Patient will move from supine to sit and sit to supine in bed with moderate assistance x1 within 7 day(s).    2.  Patient will perform sit to stand with maximal assistance x1 within 7 day(s).  3.  Patient will transfer from bed to chair and chair to bed with maximal assistance x1 using the least restrictive device within 7 day(s).  4.  Patient will ambulate with maximal assistance x1 for 25 feet with the least restrictive device within 7 day(s).     Outcome: Progressing   PHYSICAL THERAPY TREATMENT    Patient: Cuca Padron (79 y.o. female)  Date: 1/22/2025  Diagnosis: Acute pulmonary edema [J81.0]  Acute on chronic diastolic CHF (congestive heart failure) ([facility]) [I50.33]  Dyspnea, unspecified type [R06.00] Acute on chronic diastolic CHF (congestive heart failure) ([facility])      Precautions:                        ASSESSMENT:  Patient continues to benefit from skilled PT services and is slowly progressing towards goals. Patient agreeable to intervention but expressed disinterest in physical activity. She endorses being modified independent with a rolling walker prior to recent illness but feels that she is getting weaker and that therapy causes further fatigue. Discussed the need for effort and to develop fatigue to build strength and endurance. Received in bedside chair and completed multiple standing  trials. Initial stance was to a RW requiring maximum assist x2 d/t flexed posture, lack of hip extension, and a posteriorly shifted center of gravity. She required continuous 
  Problem: Physical Therapy - Adult  Goal: By Discharge: Performs mobility at highest level of function for planned discharge setting.  See evaluation for individualized goals.  Description: FUNCTIONAL STATUS PRIOR TO ADMISSION: recent hospital admission on 1/10 then d/c to [facility].  Prior to initial hospital admission patient was ambualtory with a RW>     HOME SUPPORT PRIOR TO ADMISSION:     Physical Therapy Goals  Initiated 1/19/2025  1.  Patient will move from supine to sit and sit to supine in bed with moderate assistance x1 within 7 day(s).    2.  Patient will perform sit to stand with maximal assistance x1 within 7 day(s).  3.  Patient will transfer from bed to chair and chair to bed with maximal assistance x1 using the least restrictive device within 7 day(s).  4.  Patient will ambulate with maximal assistance x1 for 25 feet with the least restrictive device within 7 day(s).     Outcome: Progressing   PHYSICAL THERAPY TREATMENT    Patient: Cuca Padron (79 y.o. female)  Date: 1/20/2025  Diagnosis: Acute pulmonary edema [J81.0]  Acute on chronic diastolic CHF (congestive heart failure) ([facility]) [I50.33]  Dyspnea, unspecified type [R06.00] Acute on chronic diastolic CHF (congestive heart failure) ([facility])      Precautions:                        ASSESSMENT:  Patient continues to benefit from skilled PT services and is progressing towards goals. Communicated with nurse cleared for therapy, patient supine on bed when received, daughter at bedside agreed with all goals set for the patient. Mobilized patient today with mod assist x 2 using a dontrell steady. Sit to stand multiple times.  Use dontrell steady to transfer to the recliner. OOB to chair as tolerated performed some active range of motion exercise on both LE all planes. Educated and instructed patient to continue to do active range of motion exercise on both LE multiple times as tolerated while on bed and while on the chair. Recommend to be up on the chair using 
  Problem: Safety - Adult  Goal: Free from fall injury  Outcome: Progressing     Problem: Chronic Conditions and Co-morbidities  Goal: Patient's chronic conditions and co-morbidity symptoms are monitored and maintained or improved  Outcome: Progressing     Problem: Discharge Planning  Goal: Discharge to home or other facility with appropriate resources  Outcome: Progressing     Problem: Pain  Goal: Verbalizes/displays adequate comfort level or baseline comfort level  Outcome: Progressing     Problem: Skin/Tissue Integrity  Goal: Absence of new skin breakdown  Description: 1.  Monitor for areas of redness and/or skin breakdown  2.  Assess vascular access sites hourly  3.  Every 4-6 hours minimum:  Change oxygen saturation probe site  4.  Every 4-6 hours:  If on nasal continuous positive airway pressure, respiratory therapy assess nares and determine need for appliance change or resting period.  Outcome: Progressing     
  Problem: Safety - Adult  Goal: Free from fall injury  Outcome: Progressing     Problem: Chronic Conditions and Co-morbidities  Goal: Patient's chronic conditions and co-morbidity symptoms are monitored and maintained or improved  Outcome: Progressing     Problem: Discharge Planning  Goal: Discharge to home or other facility with appropriate resources  Outcome: Progressing     Problem: Pain  Goal: Verbalizes/displays adequate comfort level or baseline comfort level  Outcome: Progressing     Problem: Skin/Tissue Integrity  Goal: Absence of new skin breakdown  Description: 1.  Monitor for areas of redness and/or skin breakdown  2.  Assess vascular access sites hourly  3.  Every 4-6 hours minimum:  Change oxygen saturation probe site  4.  Every 4-6 hours:  If on nasal continuous positive airway pressure, respiratory therapy assess nares and determine need for appliance change or resting period.  Outcome: Progressing     
  Problem: Safety - Adult  Goal: Free from fall injury  Outcome: Progressing     Problem: Chronic Conditions and Co-morbidities  Goal: Patient's chronic conditions and co-morbidity symptoms are monitored and maintained or improved  Outcome: Progressing  Flowsheets (Taken 1/18/2025 2000)  Care Plan - Patient's Chronic Conditions and Co-Morbidity Symptoms are Monitored and Maintained or Improved:   Collaborate with multidisciplinary team to address chronic and comorbid conditions and prevent exacerbation or deterioration   Monitor and assess patient's chronic conditions and comorbid symptoms for stability, deterioration, or improvement   Update acute care plan with appropriate goals if chronic or comorbid symptoms are exacerbated and prevent overall improvement and discharge     Problem: Discharge Planning  Goal: Discharge to home or other facility with appropriate resources  Outcome: Progressing  Flowsheets (Taken 1/18/2025 2000)  Discharge to home or other facility with appropriate resources:   Identify barriers to discharge with patient and caregiver   Arrange for needed discharge resources and transportation as appropriate   Identify discharge learning needs (meds, wound care, etc)   Arrange for interpreters to assist at discharge as needed   Refer to discharge planning if patient needs post-hospital services based on physician order or complex needs related to functional status, cognitive ability or social support system     Problem: Pain  Goal: Verbalizes/displays adequate comfort level or baseline comfort level  Outcome: Progressing  Flowsheets  Taken 1/18/2025 2300  Verbalizes/displays adequate comfort level or baseline comfort level:   Encourage patient to monitor pain and request assistance   Assess pain using appropriate pain scale   Administer analgesics based on type and severity of pain and evaluate response   Implement non-pharmacological measures as appropriate and evaluate response   Consider cultural 
again except to side step to HOB prior to returning supine.     Of note, nursing reports she was able to ambulate a few feet to a recliner with assist x2 via HHA earlier in the day.    Making slow gains but she endorses improved energy in the morning and her overall engagement waxes/wanes over time.  Agree with the current discharge plan.         PLAN:  Patient continues to benefit from skilled intervention to address the above impairments.  Continue treatment per established plan of care.        Recommendation for discharge: (in order for the patient to meet his/her long term goals):   Moderate intensity short-term skilled physical therapy up to 5x/week    Other factors to consider for discharge: patient's current support system is unable to meet their requirements for physical assistance, impaired cognition, and high risk for falls    IF patient discharges home will need the following DME: continuing to assess with progress       SUBJECTIVE:   Patient stated, \"When is the boss getting here?\"    OBJECTIVE DATA SUMMARY:   Critical Behavior:     Cognition  Overall Cognitive Status: Exceptions  Arousal/Alertness: Appears intact  Following Commands: Appears intact    Functional Mobility Training:  Bed Mobility:  Bed Mobility Training  Bed Mobility Training: No  Interventions: Safety awareness training;Verbal cues;Manual cues;Visual cues  Rolling: Moderate assistance;Additional time  Supine to Sit: Moderate assistance;Assist X1;Assist X2;Additional time  Sit to Supine: Additional time;Moderate assistance;Assist X2  Scooting: Moderate assistance;Additional time  Transfers:  Transfer Training  Interventions: Safety awareness training;Verbal cues;Tactile cues;Visual cues;Weight shifting training/pressure relief  Sit to Stand: Moderate assistance;Assist X2;Additional time  Balance:  Balance  Sitting: Impaired  Sitting - Static: Good (unsupported)  Sitting - Dynamic: Fair (occasional)  Standing: Impaired;With 
stand, replaced 2L O2 for remaining stand trials. Remained in recliner per patient choice but continue to recommend use of Antonette Stedy for transfers.  Recommend moderate intensity rehab to maximize recovery of independence, safety and endurance with functional mobility.       PLAN:  Patient continues to benefit from skilled intervention to address the above impairments.  Continue treatment per established plan of care.    Recommendations for staff mobility and toileting assistance:  Recommend that staff completes patient mobility with assist x2 using Antonette Stedy.      Recommendation for discharge: (in order for the patient to meet his/her long term goals):   Moderate intensity short-term skilled physical therapy up to 5x/week    Other factors to consider for discharge: patient's current support system is unable to meet their requirements for physical assistance, poor safety awareness, high risk for falls, not safe to be alone, and concern for safely navigating or managing the home environment    IF patient discharges home will need the following DME: continuing to assess with progress       SUBJECTIVE:   Patient stated, \"When can I go home.\"    OBJECTIVE DATA SUMMARY:   Critical Behavior:          Functional Mobility Training:  Bed Mobility:  Bed Mobility Training  Bed Mobility Training: No  Transfers:  Transfer Training  Sit to Stand: Moderate assistance;Assist X2;Additional time;Maximum assistance (from recliner)  Stand to Sit: Moderate assistance;Additional time;Assist X2;Maximum assistance  Balance:  Balance  Sitting - Static: Fair (occasional);Good (unsupported)  Sitting - Dynamic: Fair (occasional)  Standing - Static: Constant support;Poor  Standing - Dynamic: Poor;Constant support   Ambulation/Gait Training:              Neuro Re-Education:                    Pain Rating:  none     Activity Tolerance:   Fair  and requires rest breaks    After treatment:   Patient left in no apparent distress sitting up in 
step commands consistently  Attention Span: Appears intact  Safety Judgement: Decreased awareness of need for safety  Problem Solving: Impaired  Insights: Decreased awareness of deficits  Initiation: Requires cues for some  Sequencing: Requires cues for some    Functional Mobility and Transfers for ADLs:  Bed Mobility:  Bed Mobility Training  Bed Mobility Training: No     Transfers:   Transfer Training  Sit to Stand: Moderate assistance;Assist X2;Additional time;Maximum assistance (from recliner)  Stand to Sit: Moderate assistance;Additional time;Assist X2;Maximum assistance         Balance:     Balance  Sitting - Static: Fair (occasional);Good (unsupported)  Sitting - Dynamic: Fair (occasional)  Standing - Static: Constant support;Poor  Standing - Dynamic: Poor;Constant support    ADL/Therapeutic Intervention:    Instructed pt on pursed lip breathing (PLB) to assist with maintaining oxygen saturations >90% during activity and at rest. Pt instructed to inhale through nose and exhale through mouth while creating \"O\" shape with verbal, tactile, and visual cues provided to increase clarity and carry-over of technique. Instructed pt to complete focused PLB during rest breaks in order to increase ease while performing functional tasks.     Instructed patient to increase time sitting OOB in chair for pulmonary hygiene, skin integrity, and to increase endurance in preparation for ADLs. Instructed patient to sit OOB for all meals. Patient verbalized understanding of same.     Pain Rating:  Pt reporting minimal generalized pain/10   Pain Intervention(s):   rest and repositioning      Activity Tolerance:   Fair , requires frequent rest breaks, and desaturates with activity and requires oxygen  Please refer to the flowsheet for vital signs taken during this treatment.    After treatment:   Patient left in no apparent distress sitting up in chair, Call bell within reach, Bed/ chair alarm activated, and Caregiver / family

## 2025-01-24 NOTE — PROGRESS NOTES
[facility] CARDIOLOGY  Cardiology Note    Initial Encounter/Consult Note     Patient Name: Cuca Padron - :1945 - MRN:[id number]  Primary Cardiologist: HARLEY Santana  Consulting Cardiologist: David Li MD      Reason for encounter:   Heart failure    HPI:  79 y.o. 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   He was recently admitted  to  1/15 for atypical atrial flutter with RVR and severe mitral stenosis.    She was sent to rehab facility.    She was sent back to the hospital on 2025 with shortness of breath and hypoxia.  She was noted to have hypoxia 90% on room air and was started on 2 L oxygen.  She was also felt to be confused.    NT proBNP elevated on admission to 2145.  Chest x-ray on admission shows cardiomegaly and diffuse pulmonary edema.    She is also been having more weakness and trouble eating.  She is been having intermittent shakiness of the hands which is new for her.      SUBJECTIVE:  : She reports feeling slightly better today.  She remains on nasal cannula 4 L/min.  I encouraged patient to use incentive spirometer.  Encourage ambulation to chair.       Assessment and Plan     Principal Problem:    Acute on chronic diastolic CHF (congestive heart failure) ([facility])  Resolved Problems:    * No resolved hospital problems. *      # Acute on chronic heart failure with preserved ejection fraction  # Moderate to severe calcific mitral stenosis  # Elevated PASP  Presented with marked pulmonary edema.  This is likely due to moderate-severe mitral stenosis.  Volume status management in mitral stenosis can be very challenging since LA pressure may be very high causing pulm edema however forward flow may be limited.  Additionally, 
                                                          [facility] CARDIOLOGY  Cardiology Note    Initial Encounter/Consult Note     Patient Name: Cuca Padron - :1945 - MRN:[id number]  Primary Cardiologist: HARLEY Santana  Consulting Cardiologist: David Li MD      Reason for encounter:   Heart failure    HPI:  79 y.o. 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   He was recently admitted  to  1/15 for atypical atrial flutter with RVR and severe mitral stenosis.    She was sent to rehab facility.    She was sent back to the hospital on 2025 with shortness of breath and hypoxia.  She was noted to have hypoxia 90% on room air and was started on 2 L oxygen.  She was also felt to be confused.    NT proBNP elevated on admission to 2145.  Chest x-ray on admission shows cardiomegaly and diffuse pulmonary edema.    She is also been having more weakness and trouble eating.  She is been having intermittent shakiness of the hands which is new for her.      SUBJECTIVE:  : She reports feeling slightly better today.  She remains on nasal cannula 4 L/min.  I encouraged patient to use incentive spirometer.  Encourage ambulation to chair.       Assessment and Plan     Principal Problem:    Acute on chronic diastolic CHF (congestive heart failure) ([facility])  Resolved Problems:    * No resolved hospital problems. *      # Acute on chronic heart failure with preserved ejection fraction  # Moderate to severe calcific mitral stenosis  # Elevated PASP  Presented with marked pulmonary edema.  This is likely due to moderate-severe mitral stenosis.  Volume status management in mitral stenosis can be very challenging since LA pressure may be very high causing pulm edema however forward flow may be limited.  Additionally, 
                            [facility]    Cuca Padron  YOB: 1945          Assessment & Plan:     MARINA, improving. Cr at baseline  CKD 4 f/b Dr. Gambino  CHF  HTN  DM2  Anemia    Rec:  Continue diuresis. Volume seems better  Start CHRISTA  F/u with Dr. SUJATHA pritchett d/c       Subjective:   CC: MARINA, CKD  HPI: Cr at baseline. Edema better. No sob. Hb 7.3  Current Facility-Administered Medications   Medication Dose Route Frequency    amLODIPine (NORVASC) tablet 5 mg  5 mg Oral Daily    furosemide (LASIX) injection 60 mg  60 mg IntraVENous BID    amiodarone (CORDARONE) tablet 400 mg  400 mg Oral BID    traMADol (ULTRAM) tablet 50 mg  50 mg Oral Q6H PRN    ipratropium 0.5 mg-albuterol 2.5 mg (DUONEB) nebulizer solution 1 Dose  1 Dose Inhalation Q4H PRN    white petrolatum ointment   Topical PRN    sodium chloride flush 0.9 % injection 5-40 mL  5-40 mL IntraVENous 2 times per day    sodium chloride flush 0.9 % injection 5-40 mL  5-40 mL IntraVENous PRN    0.9 % sodium chloride infusion   IntraVENous PRN    ondansetron (ZOFRAN-ODT) disintegrating tablet 4 mg  4 mg Oral Q8H PRN    Or    ondansetron (ZOFRAN) injection 4 mg  4 mg IntraVENous Q6H PRN    acetaminophen (TYLENOL) tablet 650 mg  650 mg Oral Q6H PRN    Or    acetaminophen (TYLENOL) suppository 650 mg  650 mg Rectal Q6H PRN    atorvastatin (LIPITOR) tablet 20 mg  20 mg Oral Nightly    ferrous sulfate (IRON 325) tablet 162.5 mg  162.5 mg Oral Daily    insulin glargine (LANTUS) injection vial 14 Units  14 Units SubCUTAneous Nightly    insulin lispro (HUMALOG,ADMELOG) injection vial 5 Units  5 Units SubCUTAneous TID WC    levothyroxine (SYNTHROID) tablet 100 mcg  100 mcg Oral QAM AC    melatonin tablet 3 mg  3 mg Oral Nightly    metoprolol succinate (TOPROL XL) extended release tablet 100 mg  100 mg Oral BID    polyethylene glycol (GLYCOLAX) packet 17 g  17 g Oral Daily    ranolazine (RANEXA) extended release tablet 500 mg  500 
                            [facility]    Cuca Padron  YOB: 1945          Assessment & Plan:     MARINA, improving. Cr at baseline  CKD 4 f/b Dr. Gambino  CHF  HTN  Proteinuria  DM2  Anemia    Rec:  Change to PO diuretics  Add ARB  OK for d/c tomorrow as planned  Started CHRSITA  F/u with Dr. SUJATHA pritchett d/c       Subjective:   CC: MARINA, CKD  HPI: Cr stable 2.7. No sob. Changing to PO lasix  Current Facility-Administered Medications   Medication Dose Route Frequency    [START ON 1/24/2025] furosemide (LASIX) tablet 40 mg  40 mg Oral BID    [Held by provider] ranolazine (RANEXA) extended release tablet 500 mg  500 mg Oral Daily    atorvastatin (LIPITOR) tablet 40 mg  40 mg Oral Nightly    amLODIPine (NORVASC) tablet 5 mg  5 mg Oral Daily    epoetin stacey-epbx (RETACRIT) injection 20,000 Units  20,000 Units SubCUTAneous Weekly    LORazepam (ATIVAN) tablet 0.5 mg  0.5 mg Oral BID PRN    amiodarone (CORDARONE) tablet 400 mg  400 mg Oral BID    traMADol (ULTRAM) tablet 50 mg  50 mg Oral Q6H PRN    ipratropium 0.5 mg-albuterol 2.5 mg (DUONEB) nebulizer solution 1 Dose  1 Dose Inhalation Q4H PRN    white petrolatum ointment   Topical PRN    sodium chloride flush 0.9 % injection 5-40 mL  5-40 mL IntraVENous 2 times per day    sodium chloride flush 0.9 % injection 5-40 mL  5-40 mL IntraVENous PRN    0.9 % sodium chloride infusion   IntraVENous PRN    ondansetron (ZOFRAN-ODT) disintegrating tablet 4 mg  4 mg Oral Q8H PRN    Or    ondansetron (ZOFRAN) injection 4 mg  4 mg IntraVENous Q6H PRN    acetaminophen (TYLENOL) tablet 650 mg  650 mg Oral Q6H PRN    Or    acetaminophen (TYLENOL) suppository 650 mg  650 mg Rectal Q6H PRN    ferrous sulfate (IRON 325) tablet 162.5 mg  162.5 mg Oral Daily    insulin glargine (LANTUS) injection vial 14 Units  14 Units SubCUTAneous Nightly    insulin lispro (HUMALOG,ADMELOG) injection vial 5 Units  5 Units SubCUTAneous TID WC    levothyroxine (SYNTHROID) 
                            [facility]MAN[facility]    Cuca Padron  YOB: 1945          Assessment & Plan:     MARINA, improved. Cr at baseline  CKD 4 f/b Dr. Gambino  CHF  HTN  Proteinuria  DM2  Anemia    Rec:  Changed to PO diuretics  Increase losartan to 50 mg daily  OK for d/c from renal standpoint  Started CHRISTA  F/u with Dr. SOLARES 1-2 wk p d/c       Subjective:   CC: MARINA, CKD  HPI: Renal function stable. No sob. Tolerating losartan  Current Facility-Administered Medications   Medication Dose Route Frequency    insulin lispro (HUMALOG,ADMELOG) injection vial 0-4 Units  0-4 Units SubCUTAneous 4x Daily AC & HS    glucose chewable tablet 16 g  4 tablet Oral PRN    dextrose bolus 10% 125 mL  125 mL IntraVENous PRN    Or    dextrose bolus 10% 250 mL  250 mL IntraVENous PRN    glucagon injection 1 mg  1 mg SubCUTAneous PRN    dextrose 10 % infusion   IntraVENous Continuous PRN    [START ON 1/25/2025] losartan (COZAAR) tablet 50 mg  50 mg Oral Daily    furosemide (LASIX) tablet 40 mg  40 mg Oral BID    [Held by provider] ranolazine (RANEXA) extended release tablet 500 mg  500 mg Oral Daily    [START ON 1/26/2025] amiodarone (CORDARONE) tablet 200 mg  200 mg Oral Daily    atorvastatin (LIPITOR) tablet 40 mg  40 mg Oral Nightly    amLODIPine (NORVASC) tablet 5 mg  5 mg Oral Daily    epoetin stacey-epbx (RETACRIT) injection 20,000 Units  20,000 Units SubCUTAneous Weekly    LORazepam (ATIVAN) tablet 0.5 mg  0.5 mg Oral BID PRN    amiodarone (CORDARONE) tablet 400 mg  400 mg Oral BID    traMADol (ULTRAM) tablet 50 mg  50 mg Oral Q6H PRN    ipratropium 0.5 mg-albuterol 2.5 mg (DUONEB) nebulizer solution 1 Dose  1 Dose Inhalation Q4H PRN    white petrolatum ointment   Topical PRN    sodium chloride flush 0.9 % injection 5-40 mL  5-40 mL IntraVENous 2 times per day    sodium chloride flush 0.9 % injection 5-40 mL  5-40 mL IntraVENous PRN    0.9 % sodium chloride infusion   IntraVENous PRN    
      [facility]                                                                               Hospitalist Progress Note  JOSE LUIS BELLA MD          Date of Service:  2025  NAME:  Cuca Padron  :  1945  MRN:  [id number]    Please note that this dictation was completed with [employer], the computer voice recognition software.  Quite often unanticipated grammatical, syntax, homophones, and other interpretive errors are inadvertently transcribed by the computer software.  Please disregard these errors.  Please excuse any errors that have escaped final proofreading.    Admission Summary:   79 y.o. female with a history of hyperlipidemia, diabetes, CAD, CKD, atrial flutter who presented for evaluation of shortness of breath        Interval history / Subjective:   Daughter at the bedside. RRT overnight for chest pain and shortness of breath. Breathing is much better now.      Assessment & Plan:     Anticipated discharge date :    Anticipated disposition : Home w/ HH  Barriers to discharge : medical stability        Acute hypoxic respiratory failure, POA due to   Acute cardiogenic pulmonary edema due to   Acute on chronic diastolic chf, POA   Last echo (25) with LVEF 60% with diastolic dysfunction, severe mitral stenosis. CXR showing Pulm edema, proBNP elevated. Repeat echocardiogram this admission shows mitral valve mean gradient have improved to 4 mm agree now that she is in sinus rhythm with much better heart rate control. However, mitral valve area still low at 1.2 cm² and PASP elevated at 53 mmHg suggestive of significant mitral stenosis.     - Requiring 2-4L NC to maintain O2 > 90%, wean as tolerated  - continue IV lasix BID received additional dose overnight, monitor renal function  - Cardiology and nephrology following  - Given CKD IV, invasive treatment options for mitral stenosis may be limited.  May not be candidate for percutaneous TMVR 
      [facility]                                                                               Hospitalist Progress Note  JOSE LUIS BELLA MD          Date of Service:  2025  NAME:  Cuca Padron  :  1945  MRN:  [id number]    Please note that this dictation was completed with [employer], the computer voice recognition software.  Quite often unanticipated grammatical, syntax, homophones, and other interpretive errors are inadvertently transcribed by the computer software.  Please disregard these errors.  Please excuse any errors that have escaped final proofreading.    Admission Summary:   79 y.o. female with a history of hyperlipidemia, diabetes, CAD, CKD, atrial flutter who presented for evaluation of shortness of breath        Interval history / Subjective:   No acute issues overnight.  Family at the bedside.     Assessment & Plan:     Anticipated discharge date :    Anticipated disposition : SNF  Barriers to discharge : medical stability        Acute hypoxic respiratory failure, POA due to   Acute cardiogenic pulmonary edema due to   Acute on chronic diastolic chf, POA   Last echo (25) with LVEF 60% with diastolic dysfunction, severe mitral stenosis. CXR showing Pulm edema, proBNP elevated. Repeat echocardiogram this admission shows mitral valve mean gradient have improved to 4 mm agree now that she is in sinus rhythm with much better heart rate control. However, mitral valve area still low at 1.2 cm² and PASP elevated at 53 mmHg suggestive of significant mitral stenosis.     - Requiring 2-4L NC to maintain O2 > 90%, wean as tolerated  - continue IV lasix BID received additional dose overnight, monitor renal function  - Cardiology and nephrology following  - Given CKD IV, invasive treatment options for mitral stenosis may be limited.  May not be candidate for percutaneous TMVR trials due to CKD.  Surgical candidacy will need to be assessed in the 
      [facility] Hospitalist [facility]                                                                               Hospitalist Progress Note  JOSE LUIS BELLA MD          Date of Service:  2025  NAME:  Cuca Padron  :  1945  MRN:  [id number]    Please note that this dictation was completed with [employer], the computer voice recognition software.  Quite often unanticipated grammatical, syntax, homophones, and other interpretive errors are inadvertently transcribed by the computer software.  Please disregard these errors.  Please excuse any errors that have escaped final proofreading.    Admission Summary:   79 y.o. female with a history of hyperlipidemia, diabetes, CAD, CKD, atrial flutter who presented for evaluation of shortness of breath        Interval history / Subjective:   Breathing is improving.  Family at the bedside.  Patient is agreeable to going to SNF     Assessment & Plan:     Anticipated discharge date :    Anticipated disposition : Home w/ HH  Barriers to discharge : medical stability        Acute hypoxic respiratory failure, POA due to   Acute cardiogenic pulmonary edema due to   Acute on chronic diastolic chf, POA   Last echo (25) with LVEF 60% with diastolic dysfunction, severe mitral stenosis. CXR showing Pulm edema, proBNP elevated. Repeat echocardiogram this admission shows mitral valve mean gradient have improved to 4 mm agree now that she is in sinus rhythm with much better heart rate control. However, mitral valve area still low at 1.2 cm² and PASP elevated at 53 mmHg suggestive of significant mitral stenosis.     - Requiring 2-4L NC to maintain O2 > 90%, wean as tolerated  - continue IV lasix BID received additional dose overnight, monitor renal function  - Cardiology and nephrology following  - Given CKD IV, invasive treatment options for mitral stenosis may be limited.  May not be candidate for percutaneous TMVR trials due to CKD.  Surgical 
      [facility] Hospital[facility]                                                                               Hospitalist Progress Note  JOSE LUIS BELLA MD          Date of Service:  2025  NAME:  Cuca Padron  :  1945  MRN:  [id number]    Please note that this dictation was completed with [employer], the computer voice recognition software.  Quite often unanticipated grammatical, syntax, homophones, and other interpretive errors are inadvertently transcribed by the computer software.  Please disregard these errors.  Please excuse any errors that have escaped final proofreading.    Admission Summary:   79 y.o. female with a history of hyperlipidemia, diabetes, CAD, CKD, atrial flutter who presented for evaluation of shortness of breath        Interval history / Subjective:   No acute issues overnight.      Assessment & Plan:     Anticipated discharge date :    Anticipated disposition : SNF  Barriers to discharge : placement       Acute hypoxic respiratory failure, POA due to   Acute cardiogenic pulmonary edema due to   Acute on chronic diastolic chf, POA   Last echo (25) with LVEF 60% with diastolic dysfunction, severe mitral stenosis. CXR showing Pulm edema, proBNP elevated. Repeat echocardiogram this admission shows mitral valve mean gradient have improved to 4 mm agree now that she is in sinus rhythm with much better heart rate control. However, mitral valve area still low at 1.2 cm² and PASP elevated at 53 mmHg suggestive of significant mitral stenosis.     -Wean oxygen as tolerated  - Switch to oral Lasix 40 mg twice daily  -Add ARB per nephrology  - Cardiology and nephrology are consulted and have signed off  - Given CKD IV, invasive treatment options for mitral stenosis may be limited.  May not be candidate for percutaneous TMVR trials due to CKD.  Surgical candidacy will need to be assessed in the future as outpatient.  - follow-up as outpatient with her 
    Hospitalist Progress Note      NAME:  Cuca Padron   :  1945  MRM:  [id number]    Date/Time: 2025  8:22 AM           Assessment / Plan:     79 y.o. female with a history of hyperlipidemia, diabetes, CAD, CKD, atrial flutter who presented for evaluation of shortness of breath     Acute on chronic diastolic chf, POA   Pulmonary edema, POA  Acute hypoxic respiratory failure, POA  - Last echo with LVEF 60% with diastolic dysfunction, severe mitral stenosis. BNP elevated at 2145. CXR with diffuse airspace disease c/w pulmonary edema   - Requiring 2-4L NC to maintain O2 > 90%, wean as tolerated  - IV lasix 60 mg today, hold on further diuresis while monitoring renal function and diuresis PRN   - Cardiology consulted, will hold off on repeat echo at this time as she just had one done   - Concern this may be due to her severe mitral stenosis, awaiting cardiology input.     MARINA on CKD 4  - Cr baseline Cr 2.4-2.7. Cr 2.43 on admission  - Cr increased to 3.17 this AM, likely in response to diuresis vs cardiorenal   - Check urine lytes and renal US   - Nephro consult     Metabolic encephalopathy with mild confusion  Myoclonic jerks  - Recently gabapentin was stopped. May be due to medication change vs hypoxia as above  - Resolved this AM   - Continue to monitor with problem based management     ?UTI   - S/p Rocephin      Atrial flutter with recent RVR  Left atrial appendage thrombus seen on CAMRON 2025  - Cont coumadin, pharmacy to dose  - Cont diltiazem CD and metoprolol ER  - Not a candidate for watchman due to thrombus on CAMRON     IDDM with neuropathy and nephropathy  - Continue lantus 14 units qhs and 5 units TID WM, low dose SSI     VENITA  - Cont home cpap     Hypothyroid  - TSH low 0.12, fT4 pending, add Ft3  - Continue synthroid      Anemia of chronic disease  - Hgb  baseline around 8. Near baseline. No s/s of bleeding  - On iron outpatient      Recent ophthalmic herpes  - Treated & Resolved     CAD, 
    Hospitalist Progress Note      NAME:  Cuca Padron   :  1945  MRM:  [id number]    Date/Time: 2025  7:55 AM           Assessment / Plan:     79 y.o. female with a history of hyperlipidemia, diabetes, CAD, CKD, atrial flutter who presented for evaluation of shortness of breath     Acute on chronic diastolic chf, POA   Pulmonary edema, POA  Acute hypoxic respiratory failure, POA  - Last echo (25) with LVEF 60% with diastolic dysfunction, severe mitral stenosis. BNP elevated at 2145. CXR with diffuse airspace disease c/w pulmonary edema   - Requiring 2-4L NC to maintain O2 > 90%, wean as tolerated  - Continues to have crackles and O2 requirement, continue IV lasix BID  - Cardiology consulted, repeating echo. Not a candidate for transcatheter MV repair due to kidney disease. Overall, poor surgical candidate.     MARINA on CKD 4 POA  - Cr baseline Cr 2.4-2.7. Cr 2.43 on admission  - Cr increased to 3.17, likely in response to diuresis vs cardiorenal. Improved to 2.88 today   - Urine studies pending   - Renal US with no hydronephrosis   - Nephro following, continued diuresis as above . Will give with albumin     Myoclonic jerks POA  - Patient reports history of tremors for which she saw neurology in past and told she has \"familial tremor'  - This has been progressively worsening recently   - May be due to hypoxia as above vs cerebellar CVA vs essential myoclonus vs epileptic   - MRI brain ordered  - Neuro eval     ?UTI  POA  - S/p Rocephin      Atrial flutter with recent RVR POA  Left atrial appendage thrombus seen on CAMRON 2025  - Cont coumadin, pharmacy to dose  - Cont diltiazem CD and metoprolol ER  - Not a candidate for watchman due to thrombus on CAMRON     IDDM with neuropathy and nephropathy POA  - Continue lantus 14 units qhs and 5 units TID WM, low dose SSI  - Gabapentin discontinued due to medication delirium. Family is hesitant to try lyrica or cymbalta- thinking about it  - Tramadol and 
  Nephrology Progress Note    Date of Admission : 1/17/2025    CC:  Follow up for MARINA on CKD       Assessment and Plan   MARINA on CKD 3  -Cr was 3.17 up from 2.43 on admission. Baseline ~2.5, follows Dr. Gambino    -Likely from active diuresis, CRS   -No indication for RRT at this time  -pending urine chem   -was on Lasix 60mg IV; expect to see Cr increase w/ diuresis, it is ok for this to happen, but can add albumin assist   -daily labs, I/Os monitor     Acute on chronic CHF exacerbation  Acute hypoxic respiratory failure  -CXR with pulmonary edema    -Cardiology consulted  -2D echo 1/8: EF 60%, diast dysfxn    Atrial flutter  DM2   VENITA  Hypothyroidism  AOCD  Hyperlipidemia    D/w pt, daughter   For other plans, see orders.    Interval History: on 3L O2. Less edema, still some SOB. Pending labs      Review of Systems:A comprehensive review of systems was negative.    Current Medications   Current Facility-Administered Medications   Medication Dose Route Frequency    furosemide (LASIX) injection 40 mg  40 mg IntraVENous BID    amiodarone (CORDARONE) tablet 400 mg  400 mg Oral BID    Warfarin. No additional dose tonight. Patient received 5mg this am   Oral RX Placeholder    ipratropium 0.5 mg-albuterol 2.5 mg (DUONEB) nebulizer solution 1 Dose  1 Dose Inhalation Q4H PRN    white petrolatum ointment   Topical PRN    sodium chloride flush 0.9 % injection 5-40 mL  5-40 mL IntraVENous 2 times per day    sodium chloride flush 0.9 % injection 5-40 mL  5-40 mL IntraVENous PRN    0.9 % sodium chloride infusion   IntraVENous PRN    ondansetron (ZOFRAN-ODT) disintegrating tablet 4 mg  4 mg Oral Q8H PRN    Or    ondansetron (ZOFRAN) injection 4 mg  4 mg IntraVENous Q6H PRN    acetaminophen (TYLENOL) tablet 650 mg  650 mg Oral Q6H PRN    Or    acetaminophen (TYLENOL) suppository 650 mg  650 mg Rectal Q6H PRN    atorvastatin (LIPITOR) tablet 20 mg  20 mg Oral Nightly    ferrous sulfate (IRON 325) tablet 162.5 mg  162.5 mg Oral 
  Nephrology Progress Note    Date of Admission : 1/17/2025    CC:  Follow up for MARINA on CKD       Assessment and Plan   MARINA on CKD 3, Likely from active diuresis, CRS   -Baseline ~2.5, follows Dr. Gambino    -Cr peak 3.17; 2.43 on admission  -No indication for RRT at this time  -increase Lasix 60mg IV BID; expect to see Cr increase w/ diuresis, it is ok for this to happen   -daily labs, I/Os monitor     Acute on chronic CHF exacerbation  Acute hypoxic respiratory failure  -CXR with pulmonary edema    -Cardiology consulted  -2D echo 1/8: EF 60%, diast dysfxn    Atrial flutter  DM2   VENITA  Hypothyroidism  AOCD  Hyperlipidemia    D/w pt, daughter   For other plans, see orders.    Interval History: on 5L from 3L O2 but she claimed less SOB. Edema is also better. CXR worse edema. Cr is stable though       Review of Systems:A comprehensive review of systems was negative.    Current Medications   Current Facility-Administered Medications   Medication Dose Route Frequency    furosemide (LASIX) injection 60 mg  60 mg IntraVENous BID    amiodarone (CORDARONE) tablet 400 mg  400 mg Oral BID    traMADol (ULTRAM) tablet 50 mg  50 mg Oral Q6H PRN    ipratropium 0.5 mg-albuterol 2.5 mg (DUONEB) nebulizer solution 1 Dose  1 Dose Inhalation Q4H PRN    white petrolatum ointment   Topical PRN    sodium chloride flush 0.9 % injection 5-40 mL  5-40 mL IntraVENous 2 times per day    sodium chloride flush 0.9 % injection 5-40 mL  5-40 mL IntraVENous PRN    0.9 % sodium chloride infusion   IntraVENous PRN    ondansetron (ZOFRAN-ODT) disintegrating tablet 4 mg  4 mg Oral Q8H PRN    Or    ondansetron (ZOFRAN) injection 4 mg  4 mg IntraVENous Q6H PRN    acetaminophen (TYLENOL) tablet 650 mg  650 mg Oral Q6H PRN    Or    acetaminophen (TYLENOL) suppository 650 mg  650 mg Rectal Q6H PRN    atorvastatin (LIPITOR) tablet 20 mg  20 mg Oral Nightly    ferrous sulfate (IRON 325) tablet 162.5 mg  162.5 mg Oral Daily    insulin glargine (LANTUS) 
  Physician Progress Note      PATIENT:               ALLISON AHN  CSN #:                  [id number]  :                       1945  ADMIT DATE:       2025 5:43 PM  DISCH DATE:  RESPONDING  PROVIDER #:        Mac Marino MD          QUERY TEXT:    Food afternoon.    Patient admitted with acute on chronic diastolic heart failure, noted to have   persistent atypical atrial flutter with RVR/persistent atrial fibrillation and   is maintained on Warfarin.    If possible, please document in progress notes and discharge summary if you   are evaluating and/or treating any of the following:?  ?  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  Per pharmacist recommendation is for dose reduction due to   amiodarone addition and high INR on admission.  Discharge recommendations 4 mg   warfarin Monday/Wednesday/Friday and 5 mg on   Tuesday/Thursday/Saturday/.\"    Treatment: Warfarin, monitor      Thank you,  Nohelia Aguero RN, CDI  Options provided:  -- Secondary hypercoagulable state in a patient with atrial fibrillation  -- Other - I will add my own diagnosis  -- Disagree - Not applicable / Not valid  -- Disagree - Clinically unable to determine / Unknown  -- Refer to Clinical Documentation Reviewer    PROVIDER RESPONSE TEXT:    This patient has secondary hypercoagulable state in a patient with atrial   fibrillation.    Query created by: Nohelia Aguero on 2025 3:21 PM      Electronically signed by:  Mac Marino MD 2025 4:27 PM          
1257 This RN called [facility] and gave transfer report to JUAN ADNIEL Richardson.  
7048 Patient requesting to speak with charge   
At 1545: Spoke to  with Dr Hawley Nephrology office. Patient was prescribed losartan by Dr Hawley and she does not recall discussing this with MD. Since Bps improved after IV lasix today, and taking so many BP meds per patient she would like to discuss with Dr Hawley prior to starting it.   
Attempted to work with the patient for Physical Therapy, chart reviewed and discussed with nurse patient off the floor for a test. We will continue to follow up with the patient for therapy.  
Brief Neurology Note    EEG with mild generalized slowing suggestive of encephalopathic process, not specific as to cause.  No seizure discharges seen    Brain MRI without evidence of any recent stroke, or any other acute intracranial abnormality.     
EEG completed  
[facility] Pharmacy Dosing Services: 1/23/2025   Consult for Warfarin Dosing by Pharmacy by Dr. Marino  Consult provided for this 79 y.o. female , for indication of Afib/Thrombus of left atrial appendage  Day of Therapy  Resumed from [facility]. PTA: Unclear dose at [facility], ~5mg given on 1/16 per nurse at [facility].  Dose to achieve an INR goal of 2.5-3 per cards    Warfarin 5 mg PO x1 ordered today (1/23/25) to be given at 1800.  Warfarin 4 mg dose was given yesterday (1/22/25)    10% weekly dose decrease due to new amiodarone start, which can increase warfarin levels and increase INR. Further adjustment to follow.     Discharge Recommendations: Warfarin 4 mg PO MW & 5 mg PO Tuesday, Thursday, Saturday, Sunday    Significant drug interactions, such as enoxaparin: new start amiodarone, Acute CHF  PT/INR Lab Results   Component Value Date/Time    INR 2.5 01/23/2025 03:53 AM      Platelets Lab Results   Component Value Date/Time     01/23/2025 03:53 AM      H/H Lab Results   Component Value Date/Time    HGB 7.7 01/23/2025 03:53 AM        Pharmacy to follow daily and will provide subsequent Warfarin dosing based on clinical status.  Roger RushD Candidate/Roger PathakD) Contact information [phone]     
[facility] Pharmacy Dosing Services: 1/24/2025   Consult for Warfarin Dosing by Pharmacy by Dr. Marino  Consult provided for this 79 y.o. female , for indication of Afib/Thrombus of left atrial appendage  Day of Therapy Resumed from [facility]. PTA: Unclear dose at [facility], ~5mg given on 1/16 per nurse at [facility].   Dose to achieve an INR goal of 2.5-3 per cards    Order entered for  Warfarin  4 (mg) ordered to be given today at 18:00.   Warfarin 5 mg dose was given yesterday (1/23/25)    Discharge Recommendations: Reviewed and agree with warfarin discharge plan prescribed by MD.     Significant drug interactions, such as enoxaparin: new start amiodarone, acute CHF  PT/INR Lab Results   Component Value Date/Time    INR 2.5 01/24/2025 04:55 AM      Platelets Lab Results   Component Value Date/Time     01/24/2025 04:55 AM      H/H Lab Results   Component Value Date/Time    HGB 7.6 01/24/2025 04:55 AM        Pharmacy to follow daily and will provide subsequent Warfarin dosing based on clinical status.  Ashley Godinez,RogerD Candidate/Roger PathakD) Contact information [phone]     
[facility] Pharmacy Dosing Services: 01/18/25  Consult for Warfarin Dosing by Pharmacy by Dr. Parrish  Consult provided for this 78 yo female for indication of Afib/Thrombus of left atrial appendage  Day of Therapy: resumed from home. (PTA: Warfarin  Dose to achieve an INR goal of 2.5-3    Patient received Warfarin 5mg this am, no additional dose tonight     Significant drug interactions: Acute CHF,   Previous dose    PT/INR Lab Results   Component Value Date/Time    INR 1.8 01/18/2025 04:01 AM      Platelets Lab Results   Component Value Date/Time     01/18/2025 04:01 AM      H/H Lab Results   Component Value Date/Time    HGB 7.6 01/18/2025 04:01 AM        Pharmacy to follow daily and will provide subsequent Warfarin dosing based on clinical status.  CEFERINO GUERRA [facility])   Contact information [phone]    
[facility] Pharmacy Dosing Services: 01/21/25  Consult for Warfarin Dosing by Pharmacy by Dr. Parrish  Consult provided for this 80 yo female for indication of Afib/Thrombus of left atrial appendage  Day of Therapy: resumed from [facility]. (PTA: unclear dose at [facility], ~5mg given on 1/16 per nurse at [facility])  Dose to achieve an INR goal of 2.5-3 per cards    Warfarin 4 mg po today     Significant drug interactions: new start Amiodarone, Acute CHF  Previous dose    PT/INR Lab Results   Component Value Date/Time    INR 2.3 01/21/2025 02:07 PM      Platelets Lab Results   Component Value Date/Time     01/21/2025 05:43 AM      H/H Lab Results   Component Value Date/Time    HGB 7.3 01/21/2025 05:43 AM        Pharmacy to follow daily and will provide subsequent Warfarin dosing based on clinical status.  CEFERINO GUERRA [facility])   Contact information [phone]          
[facility] Pharmacy Dosing Services: 01/20/25  Consult for Warfarin Dosing by Pharmacy by Dr. Parrish  Consult provided for this 80 yo female for indication of Afib/Thrombus of left atrial appendage  Day of Therapy: resumed from [facility]. (PTA: unclear dose at [facility], ~5mg given on 1/16 per nurse at [facility])  Dose to achieve an INR goal of 2.5-3 per cards    Warfarin 5 mg po today     Significant drug interactions: new start Amiodarone, Acute CHF  Previous dose    PT/INR Lab Results   Component Value Date/Time    INR 1.8 01/20/2025 05:47 AM      Platelets Lab Results   Component Value Date/Time     01/20/2025 05:47 AM      H/H Lab Results   Component Value Date/Time    HGB 7.2 01/20/2025 05:47 AM        Pharmacy to follow daily and will provide subsequent Warfarin dosing based on clinical status.  CEFERINO GUERRA [facility])   Contact information [phone]        
Occupational Therapy Note:    Orders acknowledged and chart reviewed. Attempted to see pt for OT evaluation however she was receiving ECHO's on two attempts. Will defer and continue to follow. Thank you.    Christine Lujan, OTR/L  
Occupational Therapy:  01/20/25    Chart reviewed in pre for OT evaluation. Pt currently GENE for EEG. Will follow up later today vs tomorrow.     Thank you,  Rhina Morse, OTR/L    
[facility] Pharmacy Dosing Services: 01/22/25  Consult for Warfarin Dosing by Pharmacy by Dr. Parrish  Consult provided for this 80 yo female for indication of Afib/Thrombus of left atrial appendage  Day of Therapy: Resumed from [facility]. PTA: Unclear dose at [facility], ~5mg given on 1/16 per nurse at [facility].  Dose to achieve an INR goal of 2.5-3 per cards    Warfarin 4 mg po x 1 ordered today (1/22/25)  Warfarin 4mg dose given yesterday (1/21/25)    Significant drug interactions: new start Amiodarone, Acute CHF  Previous dose    PT/INR Lab Results   Component Value Date/Time    INR 2.4 01/22/2025 02:12 AM      Platelets Lab Results   Component Value Date/Time     01/21/2025 05:43 AM      H/H Lab Results   Component Value Date/Time    HGB 7.3 01/21/2025 05:43 AM        Pharmacy to follow daily and will provide subsequent Warfarin dosing based on clinical status.  Carleen Saeed [facility])   Contact information [phone]            
Patient seen and examined.  Detail discussion done with patient and daughters.    Continue IV Lasix 60 mg daily as ordered.    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  She is also at elevated risk of stroke.  May consider cardiac MRI but will defer to primary team or neurology.    See full consult note for other details.  
Rapid Called at 2211    Responded to RRT at 2211 for Chest Pain    Provider at bedside: YES  Interventions ordered: Labs and EKG  Sepsis Suspected: No  Transfer to Higher Level of Care: No  Blood Glucose: 187     Called to patient bedside for patient complaints of mid chest pain she reports it is reproducible with palpation.  S1, S2, LCTA, patient does report SOB, SaO2 96% on 4 L O2.      EKG performed NSR, VSS.    2229 Patient reports no pain anywhere at this time, blood labs obtained.          Vitals:    01/19/25 2215   BP: (!) 152/67   Pulse: 67   Resp:    Temp:    SpO2: 95%        Rapid Ended at 2229      BROOKS BROOKS RN   
Rt was called bedside to look over pts home cpap, cpap is hooked up and family wanted to know if changes had been made to unit. Adv family that we cannot make changes to pts home cpap. Was advised water was given for unit. Pt stated she did not want to wear cpap as it felt diff, adv family it could be she has nasal stuffiness due to illness as pt wears a nasal mask. Family then asked since o2 was being bled in would it make it feel diff, adv family that the two L o2 being bled in shouldn't make much of a diff. Pt then stated she would just wear o2 tonight and did not want to wear cpap at all. Called the on call provider and spoke with them about the situation and will cont to check on pt over night.   
Spiritual Health History and Assessment/Progress Note  [facility] [place]    Attempted Encounter,  ,  ,      Name: Cuca Padron MRN: [id number]    Age: 79 y.o.     Sex: female   Language: English   Temple: Moravian   Acute on chronic diastolic CHF (congestive heart failure) ([facility])     Date: 1/21/2025            Total Time Calculated: 10 min              Spiritual Assessment unable to assess in [facility] B3 INTERMEDIATE CARE UNIT        Referral/Consult From: Rounding   Encounter Overview/Reason: Attempted Encounter  Service Provided For: Patient not available    Cora, Belief, Meaning:   Patient unable to assess at this time  Family/Friends No family/friends present      Importance and Influence:  Patient unable to assess at this time  Family/Friends No family/friends present    Community:  Patient unknown  Family/Friends No family/friends present    Assessment and Plan of Care:   Patient Interventions include: visited patient. Patient unavailable and on the phone.  Family/Friends Interventions include: No family/friends present    Patient Plan of Care: Spiritual Care available upon further referral  Family/Friends Plan of Care: No family/friends present    Chart review. Attempted encounter. Patient unavailable. Unable to assess. Please contact spiritual health services for further assistance needed.    Electronically signed by Chaplain Ashwin on 1/21/2025 at 11:49 AM   
Spiritual Health History and Assessment/Progress Note  [facility] [place]    Initial Encounter,  ,  ,      Name: Cuca Padron MRN: [id number]    Age: 79 y.o.     Sex: female   Language: English   Amish: Buddhist   Acute on chronic diastolic CHF (congestive heart failure) ([facility])     Date: 1/22/2025            Total Time Calculated: 19 min              Spiritual Assessment began in [facility] B3 INTERMEDIATE CARE UNIT        Referral/Consult From: Rounding   Encounter Overview/Reason: Initial Encounter  Service Provided For: Patient    Cora, Belief, Meaning:   Patient is connected with a cora tradition or spiritual practice  Family/Friends No family/friends present      Importance and Influence:  Patient has spiritual/personal beliefs that influence decisions regarding their health  Family/Friends No family/friends present    Community:  Patient feels well-supported. Support system includes: Children and Extended family  Family/Friends No family/friends present    Assessment and Plan of Care:   Patient Interventions include: Facilitated expression of thoughts and feelings, Affirmed coping skills/support systems, and Provided sacramental/Presybeterian ritual  Family/Friends Interventions include: No family/friends present    Patient Plan of Care: Spiritual Care available upon further referral  Family/Friends Plan of Care: No family/friends present    Chart review. Met and conversed with patient. Patient is named Cuca Padron. Patient prefers to be called Cuca. Patient is of the Buddhist Episcopalian. Provided patient with a Rosary. Patient Episcopalian is important to her. Patient's Episcopalian influences her morals, values, and ethics. Patient has a good support system which entails her three children, and two grand children. Patient is from [place]. Patient enjoys living in [place]. Patient shared how she fell in her home. Patient was brought to hospital. Patient lives with her daughter. Patient has a 
Spiritual Health History and Assessment/Progress Note  [facility]    Attempted Encounter,  ,  ,      Name: Cuca Padron MRN: [id number]    Age: 79 y.o.     Sex: female   Language: English   Quaker: Mormonism   Acute on chronic diastolic CHF (congestive heart failure) ([facility])     Date: 1/22/2025            Total Time Calculated: 10 min              Spiritual Assessment unable to assess in [facility] B3 INTERMEDIATE CARE UNIT        Referral/Consult From: Rounding   Encounter Overview/Reason: Attempted Encounter  Service Provided For: Patient not available    Cora, Belief, Meaning:   Patient unable to assess at this time  Family/Friends No family/friends present      Importance and Influence:  Patient unable to assess at this time  Family/Friends No family/friends present    Community:  Patient unknown  Family/Friends No family/friends present    Assessment and Plan of Care:   Patient Interventions include: unable to assess  Family/Friends Interventions include: No family/friends present    Patient Plan of Care: Spiritual Care available upon further referral  Family/Friends Plan of Care: No family/friends present    Chart review. Attempted encounter. Patient unavailable. Unable to assess patient. Please contact spiritual health services for further assistance needed.    Electronically signed by Chaplain Ashwin on 1/22/2025 at 10:20 AM   
Spiritual Health History and Assessment/Progress Note  [facility]    Attempted Encounter,  ,  ,      Name: Cuca Padron MRN: [id number]    Age: 79 y.o.     Sex: female   Language: English   Baptism: Restorationist   Acute on chronic diastolic CHF (congestive heart failure) ([facility])     Date: 1/20/2025            Total Time Calculated: 10 min              Spiritual Assessment unable to assess in [facility] B3 INTERMEDIATE CARE UNIT        Referral/Consult From: Rounding   Encounter Overview/Reason: Attempted Encounter  Service Provided For: Patient not available (Family not available)    Cora, Belief, Meaning:   Patient unable to assess at this time  Family/Friends unable to assess at this time.      Importance and Influence:  Patient unable to assess at this time  Family/Friends unable to assess at this time.    Community:  Patient unknown  Family/Friends unable to assess at this time.    Assessment and Plan of Care:   Patient Interventions include: unknown  Family/Friends Interventions include: unable to assess at this time.    Patient Plan of Care: Spiritual Care available upon further referral  Family/Friends Plan of Care: Spiritual Care available upon further referral    Chart review. Spoke to nurse. Patient unavailable and heading for medical tests. Unable to assess. Attempted encounter. Please contact spiritual health services for further assistance needed.    Electronically signed by Chaplain Ashwin on 1/20/2025 at 11:18 AM   
[facility] Pharmacy Dosing Services: 01/19/25  Consult for Warfarin Dosing by Pharmacy by Dr. Parrish  Consult provided for this 78 yo female for indication of Afib/Thrombus of left atrial appendage  Day of Therapy: resumed from [facility]. (PTA: unclear dose at [facility], ~5mg given on 1/16 per nurse at [facility])  Dose to achieve an INR goal of 2.5-3 per cards    Warfarin 4mg po today and will consider further dose reduction due to addition of Amiodarone    Significant drug interactions: Amiodarone, Acute CHF  Previous dose    PT/INR Lab Results   Component Value Date/Time    INR 1.9 01/19/2025 01:57 AM      Platelets Lab Results   Component Value Date/Time     01/18/2025 04:01 AM      H/H Lab Results   Component Value Date/Time    HGB 7.6 01/18/2025 04:01 AM        Pharmacy to follow daily and will provide subsequent Warfarin dosing based on clinical status.  CEFERINO GUERRA [facility])   Contact information [phone]      
release tablet 100 mg  100 mg Oral BID    polyethylene glycol (GLYCOLAX) packet 17 g  17 g Oral Daily    ranolazine (RANEXA) extended release tablet 500 mg  500 mg Oral BID    sennosides-docusate sodium (SENOKOT-S) 8.6-50 MG tablet 2 tablet  2 tablet Oral Daily    traZODone (DESYREL) tablet 50 mg  50 mg Oral Nightly PRN    vitamin B-12 (CYANOCOBALAMIN) tablet 1,000 mcg  1,000 mcg Oral Daily    Vitamin D (CHOLECALCIFEROL) tablet 2,000 Units  2,000 Units Oral Daily    warfarin placeholder: dosing by pharmacy   Oral RX Placeholder    I-Sukumar TABS 2 tablet  2 tablet Oral Daily    ascorbic acid (VITAMIN C) tablet 250 mg  250 mg Oral Daily          Objective:     Vitals:  Blood pressure (!) 154/53, pulse 63, temperature 98.2 °F (36.8 °C), temperature source Oral, resp. rate 14, height 1.676 m (5' 6\"), weight 88.3 kg (194 lb 9.6 oz), SpO2 94%.  Temp (24hrs), Av.2 °F (36.8 °C), Min:97.8 °F (36.6 °C), Max:98.4 °F (36.9 °C)      Intake and Output:  No intake/output data recorded.   1901 -  0700  In: -   Out: 1750 [Urine:1750]    Physical Exam:               GENERAL ASSESSMENT: NAD  HEENT: Nontraumatic   CHEST: On oxygen CTA  HEART: S1S2  ABDOMEN: Soft,NT  EXTREMITY: no EDEMA  NEURO: Grossly non focal          ECG/rhythm:    Data Review        Recent Labs     25  2244 25  0547 25  0543    137 138   K 4.3 4.3 4.0    103 102   CO2 27 29 28   BUN 86* 88* 85*   CREATININE 2.83* 2.70* 2.57*   GLUCOSE 152* 137* 75   CALCIUM 9.0 9.0 9.0           : Sharon Hawley MD  2025        [facility]:  [email or web address]  [email or web address]    [place] office:  [address], [address]  [place] [address]  Phone: [phone]  Fax :     [phone]    [place] office:  [address] [place] [address]  Phone - [phone]  Fax - [phone]                                                                                        
stenosis.  --Continue Lasix 60 mg IV twice daily per renal recs.  She would likely need several days of slow diuresis with close   --Will continue trying to optimize rate and rhythm control to keep mitral valve gradients low in setting of mitral stenosis  --Given CKD IV, invasive treatment options for mitral stenosis may be limited.  May not be candidate for percutaneous TMVR trials due to CKD.  Surgical candidacy will need to be assessed in the future as outpatient.  --She should follow-up as outpatient with her cardiologist [facility] Dr. Santana to discuss more long-term plans    # Persistent atypical atrial flutter with RVR  # Persistent atrial fibrillation  --remains in SR  --Cont amiodarone loading 400 mg twice daily for 14 days followed by amiodarone 200 mg once daily  --Warfarin dosing will need to be adjusted since she will be on amiodarone.  --Will continue metoprolol tartrate 100 mg twice daily for now.  Will monitor sinus rates.  If consistently becomes bradycardic with amiodarone, may consider decreasing metoprolol dose  --Diltiazem dc'd by EP    # Possible left atrial appendage thrombus  She is not a candidate for Watchman device given her severe mitral stenosis.  She needs to be on warfarin for oral anticoagulation with INR goal of 2.5-3.   -- Continue warfarin    # MARINA on chronic Kidney Disease Stage IV  --Cr trending down to 2.57   -- lasix increased by nephrology     # HTN  --add norvasc  --cont metoprolol XL as above  --hold off on ACE/ARB d/t renal function            ____________________________________________________________    Cardiac testing    01/17/25    ECHO (TTE) COMPLETE (PRN CONTRAST/BUBBLE/STRAIN/3D) 01/19/2025  2:57 PM (Final)    Interpretation Summary    Image quality is good. Procedure performed with the patient in a sitting position and procedure performed with the patient in a supine position.    Rhythm is sinus bradycardia 56 bpm.    LV is normal in size.  Mildly increased wall

## 2025-01-24 NOTE — DISCHARGE SUMMARY
Discharge Summary   Please note that this dictation was completed with [employer], the computer voice recognition software.  Quite often unanticipated grammatical, syntax, homophones, and other interpretive errors are inadvertently transcribed by the computer software.  Please disregard these errors.  Please excuse any errors that have escaped final proofreading.    PATIENT ID: Cuca Padron  MRN: [id number]   YOB: 1945    DATE OF ADMISSION: 1/17/2025  5:43 PM    DATE OF DISCHARGE: 1/24/2025  PRIMARY CARE PROVIDER: JOANNA Gil MD         ATTENDING PHYSICIAN: JOSE LUIS BELLA MD  DISCHARGING PROVIDER: JOSE LUIS BELLA MD       CONSULTATIONS: IP CONSULT TO CARDIOLOGY  IP CONSULT TO PHARMACY  IP CONSULT TO NEPHROLOGY  IP CONSULT TO NEUROLOGY    PROCEDURES/SURGERIES: * No surgery found *    ADMITTING HPI from excerpted H&P   79 y.o.  female with PMHx coronary artery disease, CKD stage IV, atrial flutter, insulin-dependent diabetes with neuropathy and nephropathy, anemia of chronic disease, obstructive sleep apnea on CPAP who was recently admitted to [facility] with a flutter with RVR, ophthalmic herpes, found to have EF of 60% with diastolic dysfunction (severity could not be classified, severe mitral stenosis presented from [facility] for shortness of breath and hypoxia.  She was noted to be 90% on room air and was started on 2 L O2 yesterday at [facility].  She was also felt to be confused and gabapentin was discontinued yesterday at [facility].  She has been having severe dyspnea with exertion.  Denies any chest pain.     Chest x-ray shows pulmonary edema and she is currently on 4 L of O2.  Not on home O2 baseline  She believes her cardiologist is a Dr. Valentino (question spelling) with [facility].      Available records were reviewed at the time of H&P.        HOSPITAL COURSE & DISCHARGE DIAGNOSIS/ PLAN:          Acute hypoxic respiratory failure, POA due to   Acute

## 2025-01-24 NOTE — CARE COORDINATION
Transition of Care Plan to SNF/Rehab    Pt has been accepted to the [facility] and a skilled bed is available today.    RN, please call report to [phone].  Pt will go to room 712.  SMA galarza has been requested for 2 p.m.  Dtr aware and agreeable.    Communication to Patient/Family:   Met with patient and family and they are agreeable to the transition plan. The Plan for Transition of Care is related to the following treatment goals: Acute pulmonary edema [J81.0]  Acute on chronic diastolic CHF (congestive heart failure) (HCC) [I50.33]  Dyspnea, unspecified type [R06.00]      The Patient and/or patient representative was provided with a choice of provider and agrees  with the discharge plan.      Yes [x] No []    A Freedom of choice list was provided with basic dialogue that supports the patient's individualized plan of care/goals and shares the quality data associated with the providers.       Yes [x] No []    SNF/Rehab Transition:  Patient has been accepted to [facility] and meets criteria for admission.     Medicare 3 night stay satisfied? [x]   Inpatient Admission Dates: 1/17 - 1/24    [facility]  [x] Packet on chart (if needed)  [] PCS completed (if applicable)     Communication to SNF/Rehab:  Bedside RN, Macrina, has been notified to update the transition plan to the facility and call report (phone number).  Discharge information has been updated on the AVS. And communicated to facility via [employer], or CC link.     Discharge instructions to be fax'd to facility via [] [employer] [x] [employer]      Nursing Please include all hard scripts for controlled substances, med rec and dc summary, and AVS in packet.       Nursing, please discuss the following applicable information with the facility's nursing during report:     Gal with (X) only those applicable:  Medication:  []Medications are available at the

## 2025-01-24 NOTE — DISCHARGE INSTRUCTIONS
You came to the hospital with shortness of breath and was noted to have fluid in your lungs as a result of congestive heart failure exacerbation.  Your kidney numbers were noted to be elevated likely as a result of heart failure.  You were seen by cardiology and nephrology. You were treated with intravenous diuretic which has subsequently been switched to oral diuretic at the time of discharge.  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  Please continue to take the warfarin.  Dose of atorvastatin has been increased.  You were seen by a neurologist while you were in the hospital.        Wound Care Instructions:  Cleanse open L buttock/sacrum wound with wound cleanser and apply Venelex twice daily and as needed.     Utilize zinc cream on all other rae areas as needed after episodes of incontinence.

## (undated) DEVICE — FORCEPS BX L240CM JAW DIA2.8MM L CAP W/ NDL MIC MESH TOOTH